# Patient Record
Sex: FEMALE | Race: BLACK OR AFRICAN AMERICAN | NOT HISPANIC OR LATINO | URBAN - METROPOLITAN AREA
[De-identification: names, ages, dates, MRNs, and addresses within clinical notes are randomized per-mention and may not be internally consistent; named-entity substitution may affect disease eponyms.]

---

## 2019-03-19 ENCOUNTER — EMERGENCY (EMERGENCY)
Facility: HOSPITAL | Age: 44
LOS: 0 days | Discharge: HOME | End: 2019-03-19
Attending: EMERGENCY MEDICINE
Payer: SELF-PAY

## 2019-03-19 VITALS
HEIGHT: 65 IN | WEIGHT: 160.06 LBS | SYSTOLIC BLOOD PRESSURE: 145 MMHG | OXYGEN SATURATION: 100 % | TEMPERATURE: 98 F | HEART RATE: 104 BPM | DIASTOLIC BLOOD PRESSURE: 105 MMHG | RESPIRATION RATE: 16 BRPM

## 2019-03-19 VITALS
TEMPERATURE: 98 F | DIASTOLIC BLOOD PRESSURE: 85 MMHG | RESPIRATION RATE: 15 BRPM | HEART RATE: 77 BPM | OXYGEN SATURATION: 100 % | SYSTOLIC BLOOD PRESSURE: 133 MMHG

## 2019-03-19 DIAGNOSIS — H92.01 OTALGIA, RIGHT EAR: ICD-10-CM

## 2019-03-19 DIAGNOSIS — H60.91 UNSPECIFIED OTITIS EXTERNA, RIGHT EAR: ICD-10-CM

## 2019-03-19 LAB
ALBUMIN SERPL ELPH-MCNC: 3.5 G/DL — SIGNIFICANT CHANGE UP (ref 3.3–5)
ALP SERPL-CCNC: 66 U/L — SIGNIFICANT CHANGE UP (ref 40–120)
ALT FLD-CCNC: 17 U/L — SIGNIFICANT CHANGE UP (ref 12–78)
ANION GAP SERPL CALC-SCNC: 5 MMOL/L — SIGNIFICANT CHANGE UP (ref 5–17)
AST SERPL-CCNC: 15 U/L — SIGNIFICANT CHANGE UP (ref 15–37)
BASOPHILS # BLD AUTO: 0.04 K/UL — SIGNIFICANT CHANGE UP (ref 0–0.2)
BASOPHILS NFR BLD AUTO: 0.6 % — SIGNIFICANT CHANGE UP (ref 0–2)
BILIRUB SERPL-MCNC: 0.3 MG/DL — SIGNIFICANT CHANGE UP (ref 0.2–1.2)
BUN SERPL-MCNC: 8 MG/DL — SIGNIFICANT CHANGE UP (ref 7–23)
CALCIUM SERPL-MCNC: 9 MG/DL — SIGNIFICANT CHANGE UP (ref 8.5–10.1)
CHLORIDE SERPL-SCNC: 107 MMOL/L — SIGNIFICANT CHANGE UP (ref 96–108)
CO2 SERPL-SCNC: 27 MMOL/L — SIGNIFICANT CHANGE UP (ref 22–31)
CREAT SERPL-MCNC: 0.58 MG/DL — SIGNIFICANT CHANGE UP (ref 0.5–1.3)
CRP SERPL-MCNC: 0.27 MG/DL — SIGNIFICANT CHANGE UP (ref 0–0.4)
EOSINOPHIL # BLD AUTO: 0.02 K/UL — SIGNIFICANT CHANGE UP (ref 0–0.5)
EOSINOPHIL NFR BLD AUTO: 0.3 % — SIGNIFICANT CHANGE UP (ref 0–6)
ERYTHROCYTE [SEDIMENTATION RATE] IN BLOOD: 27 MM/HR — HIGH (ref 0–15)
GLUCOSE SERPL-MCNC: 86 MG/DL — SIGNIFICANT CHANGE UP (ref 70–99)
HCT VFR BLD CALC: 36.4 % — SIGNIFICANT CHANGE UP (ref 34.5–45)
HGB BLD-MCNC: 10.8 G/DL — LOW (ref 11.5–15.5)
IMM GRANULOCYTES NFR BLD AUTO: 0.3 % — SIGNIFICANT CHANGE UP (ref 0–1.5)
LYMPHOCYTES # BLD AUTO: 2.46 K/UL — SIGNIFICANT CHANGE UP (ref 1–3.3)
LYMPHOCYTES # BLD AUTO: 35.7 % — SIGNIFICANT CHANGE UP (ref 13–44)
MCHC RBC-ENTMCNC: 23.1 PG — LOW (ref 27–34)
MCHC RBC-ENTMCNC: 29.7 GM/DL — LOW (ref 32–36)
MCV RBC AUTO: 77.9 FL — LOW (ref 80–100)
MONOCYTES # BLD AUTO: 0.6 K/UL — SIGNIFICANT CHANGE UP (ref 0–0.9)
MONOCYTES NFR BLD AUTO: 8.7 % — SIGNIFICANT CHANGE UP (ref 2–14)
NEUTROPHILS # BLD AUTO: 3.76 K/UL — SIGNIFICANT CHANGE UP (ref 1.8–7.4)
NEUTROPHILS NFR BLD AUTO: 54.4 % — SIGNIFICANT CHANGE UP (ref 43–77)
NRBC # BLD: 0 /100 WBCS — SIGNIFICANT CHANGE UP (ref 0–0)
PLATELET # BLD AUTO: 239 K/UL — SIGNIFICANT CHANGE UP (ref 150–400)
POTASSIUM SERPL-MCNC: 4 MMOL/L — SIGNIFICANT CHANGE UP (ref 3.5–5.3)
POTASSIUM SERPL-SCNC: 4 MMOL/L — SIGNIFICANT CHANGE UP (ref 3.5–5.3)
PROT SERPL-MCNC: 8 GM/DL — SIGNIFICANT CHANGE UP (ref 6–8.3)
RBC # BLD: 4.67 M/UL — SIGNIFICANT CHANGE UP (ref 3.8–5.2)
RBC # FLD: 18 % — HIGH (ref 10.3–14.5)
SODIUM SERPL-SCNC: 139 MMOL/L — SIGNIFICANT CHANGE UP (ref 135–145)
WBC # BLD: 6.9 K/UL — SIGNIFICANT CHANGE UP (ref 3.8–10.5)
WBC # FLD AUTO: 6.9 K/UL — SIGNIFICANT CHANGE UP (ref 3.8–10.5)

## 2019-03-19 PROCEDURE — 70450 CT HEAD/BRAIN W/O DYE: CPT | Mod: 26

## 2019-03-19 PROCEDURE — 99284 EMERGENCY DEPT VISIT MOD MDM: CPT

## 2019-03-19 RX ORDER — CIPROFLOXACIN LACTATE 400MG/40ML
500 VIAL (ML) INTRAVENOUS ONCE
Qty: 0 | Refills: 0 | Status: COMPLETED | OUTPATIENT
Start: 2019-03-19 | End: 2019-03-19

## 2019-03-19 RX ORDER — CIPROFLOXACIN AND DEXAMETHASONE 3; 1 MG/ML; MG/ML
4 SUSPENSION/ DROPS AURICULAR (OTIC)
Qty: 50 | Refills: 0
Start: 2019-03-19 | End: 2019-03-25

## 2019-03-19 RX ORDER — CIPROFLOXACIN LACTATE 400MG/40ML
1 VIAL (ML) INTRAVENOUS
Qty: 20 | Refills: 0
Start: 2019-03-19 | End: 2019-03-28

## 2019-03-19 RX ORDER — KETOROLAC TROMETHAMINE 30 MG/ML
30 SYRINGE (ML) INJECTION ONCE
Qty: 0 | Refills: 0 | Status: DISCONTINUED | OUTPATIENT
Start: 2019-03-19 | End: 2019-03-19

## 2019-03-19 RX ORDER — IBUPROFEN 200 MG
1 TABLET ORAL
Qty: 28 | Refills: 0
Start: 2019-03-19 | End: 2019-03-25

## 2019-03-19 RX ORDER — CIPROFLOXACIN AND DEXAMETHASONE 3; 1 MG/ML; MG/ML
5 SUSPENSION/ DROPS AURICULAR (OTIC) ONCE
Qty: 0 | Refills: 0 | Status: COMPLETED | OUTPATIENT
Start: 2019-03-19 | End: 2019-03-19

## 2019-03-19 RX ORDER — CIPROFLOXACIN HCL 0.3 %
5 DROPS OPHTHALMIC (EYE) ONCE
Qty: 0 | Refills: 0 | Status: DISCONTINUED | OUTPATIENT
Start: 2019-03-19 | End: 2019-03-19

## 2019-03-19 RX ADMIN — CIPROFLOXACIN AND DEXAMETHASONE 5 DROP(S): 3; 1 SUSPENSION/ DROPS AURICULAR (OTIC) at 12:30

## 2019-03-19 RX ADMIN — Medication 30 MILLIGRAM(S): at 11:01

## 2019-03-19 RX ADMIN — Medication 30 MILLIGRAM(S): at 10:38

## 2019-03-19 RX ADMIN — Medication 500 MILLIGRAM(S): at 12:30

## 2019-03-19 NOTE — ED ADULT NURSE NOTE - OBJECTIVE STATEMENT
Patient A&Ox3, offers complaints of ear pain that started sunday night with bleeding and yellow drainage. pain is localized to right ear. denies PMH

## 2019-03-19 NOTE — ED ADULT NURSE NOTE - NSIMPLEMENTINTERV_GEN_ALL_ED
Implemented All Universal Safety Interventions:  [place] to call system. Call bell, personal items and telephone within reach. Instruct patient to call for assistance. Room bathroom lighting operational. Non-slip footwear when patient is off stretcher. Physically safe environment: no spills, clutter or unnecessary equipment. Stretcher in lowest position, wheels locked, appropriate side rails in place.

## 2019-03-19 NOTE — ED PROVIDER NOTE - OBJECTIVE STATEMENT
Pt is a 44 yo lady with no significant past medical history who presents to the ED with R. ear pain. She used cotton to clean out her ear on Sunday, and later that day had ear pain. No change in hearing, no ringing. No fever, but has some purulent discharge. No headache, no sore throat, no rhinnorhea.

## 2019-03-19 NOTE — ED PROVIDER NOTE - PROGRESS NOTE DETAILS
Pt is feeling better, CT Head shows otitis externa, no clinical concern for mastoiditis given lack of pain at mastoid and no swelling or erythema. Pt referred to ENT for f/u, abx given. Return precautions given, ok for d/c.

## 2019-03-19 NOTE — ED PROVIDER NOTE - CLINICAL SUMMARY MEDICAL DECISION MAKING FREE TEXT BOX
Ddx: otitis externa/ ro malignant otitis externa/ mastoiditis  Plan: cbc, cmp esr, crp, ct head, abx,reassess Ddx: otitis externa/ ro malignant otitis externa/ mastoiditis, although no tenderness at mastoid  Plan: cbc, cmp esr, crp, ct head, abx,reassess

## 2019-03-19 NOTE — ED PROVIDER NOTE - RIGHT EAR
some purulent drainage, inflammation of ear canal. Slight pain with movement of pinna. No swelling of mastoid, nontender preauricular area. No swelling, no erythema

## 2019-03-20 ENCOUNTER — EMERGENCY (EMERGENCY)
Facility: HOSPITAL | Age: 44
LOS: 0 days | Discharge: HOME | End: 2019-03-21
Attending: EMERGENCY MEDICINE
Payer: SELF-PAY

## 2019-03-20 VITALS
WEIGHT: 160.06 LBS | DIASTOLIC BLOOD PRESSURE: 95 MMHG | HEIGHT: 62 IN | TEMPERATURE: 98 F | SYSTOLIC BLOOD PRESSURE: 125 MMHG | HEART RATE: 106 BPM | RESPIRATION RATE: 17 BRPM | OXYGEN SATURATION: 100 %

## 2019-03-20 PROCEDURE — 99053 MED SERV 10PM-8AM 24 HR FAC: CPT

## 2019-03-20 PROCEDURE — 99284 EMERGENCY DEPT VISIT MOD MDM: CPT | Mod: 25

## 2019-03-20 RX ORDER — OXYCODONE AND ACETAMINOPHEN 5; 325 MG/1; MG/1
1 TABLET ORAL ONCE
Qty: 0 | Refills: 0 | Status: DISCONTINUED | OUTPATIENT
Start: 2019-03-20 | End: 2019-03-20

## 2019-03-20 RX ADMIN — OXYCODONE AND ACETAMINOPHEN 1 TABLET(S): 5; 325 TABLET ORAL at 23:46

## 2019-03-20 RX ADMIN — OXYCODONE AND ACETAMINOPHEN 1 TABLET(S): 5; 325 TABLET ORAL at 23:16

## 2019-03-20 NOTE — ED ADULT NURSE NOTE - OBJECTIVE STATEMENT
Received patient in chair 26. C/o right ear pain with discharge 10/10. Right face/neck swelling noted. Patient states sore throat with difficulty eating. Percocet given at this time. Awaiting CT

## 2019-03-21 VITALS
SYSTOLIC BLOOD PRESSURE: 147 MMHG | TEMPERATURE: 98 F | RESPIRATION RATE: 17 BRPM | HEART RATE: 100 BPM | DIASTOLIC BLOOD PRESSURE: 105 MMHG | OXYGEN SATURATION: 98 %

## 2019-03-21 PROCEDURE — 70450 CT HEAD/BRAIN W/O DYE: CPT | Mod: 26

## 2019-03-21 RX ORDER — OXYCODONE AND ACETAMINOPHEN 5; 325 MG/1; MG/1
1 TABLET ORAL ONCE
Qty: 0 | Refills: 0 | Status: DISCONTINUED | OUTPATIENT
Start: 2019-03-21 | End: 2019-03-21

## 2019-03-21 RX ORDER — TRAMADOL HYDROCHLORIDE 50 MG/1
1 TABLET ORAL
Qty: 21 | Refills: 0
Start: 2019-03-21 | End: 2019-03-27

## 2019-03-21 RX ADMIN — OXYCODONE AND ACETAMINOPHEN 1 TABLET(S): 5; 325 TABLET ORAL at 02:16

## 2019-03-21 RX ADMIN — OXYCODONE AND ACETAMINOPHEN 1 TABLET(S): 5; 325 TABLET ORAL at 02:46

## 2019-03-21 NOTE — ED PROVIDER NOTE - CLINICAL SUMMARY MEDICAL DECISION MAKING FREE TEXT BOX
pt pw otitis externa. she has not given her ear infection enough time for the cipro to treat it. will give analgesics and reassess. pt pw otitis externa. she has not given her ear infection enough time for the cipro to treat it. will give analgesics and reassess. ct reasssuring. no mastoid involvement. continue ciprodex. ok for dc home.

## 2019-03-21 NOTE — ED PROVIDER NOTE - PHYSICAL EXAMINATION
Gen: Alert, NAD  Head: NC, AT   Eyes: PERRL, EOMI, normal lids/conjunctiva  ENT: decreased hearing right ear. purulent drainage and inflammation of the external canal   Neck: supple, no tenderness, Trachea midline  Pulm: Bilateral BS, normal resp effort, no wheeze/stridor/retractions  CV: RRR, no M/R/G, 2+ radial and dp pulses bl, no edema  Abd: soft, NT/ND, +BS, no hepatosplenomegaly  Mskel: extremities x4 with normal ROM and no joint effusions. no ctl spine ttp.   Skin: no rash, no bruising   Neuro: AAOx3, no sensory/motor deficits, CN 2-12 intact

## 2019-03-21 NOTE — ED PROVIDER NOTE - OBJECTIVE STATEMENT
Pertinent PMH/PSH/FHx/SHx and Review of Systems contained within:  43f recent dx of otitis externa pw continued pain in affected ear (right) started on cipro drops 3/19 but reports no improvement. has continued pain, continued discharge. no fever, chills, nausea, vomiting, bleeding, ha, vision loss, syncope  Fh and Sh not otherwise contributory  ROS otherwise negative

## 2019-03-21 NOTE — ED PROVIDER NOTE - NSFOLLOWUPINSTRUCTIONS_ED_ALL_ED_FT
Take the pain medication as prescribed.    Put nothing in your ear other than when instructed by a physician.     Continue the ear drops as prescribed.

## 2019-03-22 DIAGNOSIS — H60.311 DIFFUSE OTITIS EXTERNA, RIGHT EAR: ICD-10-CM

## 2019-03-22 DIAGNOSIS — H92.01 OTALGIA, RIGHT EAR: ICD-10-CM

## 2019-03-25 ENCOUNTER — EMERGENCY (EMERGENCY)
Facility: HOSPITAL | Age: 44
LOS: 1 days | Discharge: HOME | End: 2019-03-25
Attending: EMERGENCY MEDICINE | Admitting: INTERNAL MEDICINE
Payer: SELF-PAY

## 2019-03-25 ENCOUNTER — EMERGENCY (EMERGENCY)
Facility: HOSPITAL | Age: 44
LOS: 0 days | Discharge: ANOTHER HOSPITAL | End: 2019-03-25
Attending: EMERGENCY MEDICINE
Payer: SELF-PAY

## 2019-03-25 VITALS
HEART RATE: 101 BPM | OXYGEN SATURATION: 94 % | RESPIRATION RATE: 16 BRPM | SYSTOLIC BLOOD PRESSURE: 144 MMHG | DIASTOLIC BLOOD PRESSURE: 100 MMHG | TEMPERATURE: 97 F

## 2019-03-25 VITALS
WEIGHT: 154.98 LBS | DIASTOLIC BLOOD PRESSURE: 99 MMHG | RESPIRATION RATE: 18 BRPM | HEART RATE: 94 BPM | TEMPERATURE: 99 F | OXYGEN SATURATION: 99 % | HEIGHT: 62 IN | SYSTOLIC BLOOD PRESSURE: 152 MMHG

## 2019-03-25 VITALS
OXYGEN SATURATION: 99 % | DIASTOLIC BLOOD PRESSURE: 87 MMHG | TEMPERATURE: 99 F | SYSTOLIC BLOOD PRESSURE: 161 MMHG | RESPIRATION RATE: 16 BRPM | HEART RATE: 95 BPM

## 2019-03-25 DIAGNOSIS — H92.09 OTALGIA, UNSPECIFIED EAR: ICD-10-CM

## 2019-03-25 DIAGNOSIS — H60.90 UNSPECIFIED OTITIS EXTERNA, UNSPECIFIED EAR: ICD-10-CM

## 2019-03-25 DIAGNOSIS — H70.90 UNSPECIFIED MASTOIDITIS, UNSPECIFIED EAR: ICD-10-CM

## 2019-03-25 LAB
ALBUMIN SERPL ELPH-MCNC: 3.1 G/DL — LOW (ref 3.3–5)
ALP SERPL-CCNC: 60 U/L — SIGNIFICANT CHANGE UP (ref 40–120)
ALT FLD-CCNC: 14 U/L — SIGNIFICANT CHANGE UP (ref 12–78)
ANION GAP SERPL CALC-SCNC: 7 MMOL/L — SIGNIFICANT CHANGE UP (ref 5–17)
AST SERPL-CCNC: 14 U/L — LOW (ref 15–37)
BASOPHILS # BLD AUTO: 0.04 K/UL — SIGNIFICANT CHANGE UP (ref 0–0.2)
BASOPHILS NFR BLD AUTO: 0.5 % — SIGNIFICANT CHANGE UP (ref 0–2)
BILIRUB SERPL-MCNC: 0.3 MG/DL — SIGNIFICANT CHANGE UP (ref 0.2–1.2)
BUN SERPL-MCNC: 10 MG/DL — SIGNIFICANT CHANGE UP (ref 7–23)
CALCIUM SERPL-MCNC: 8.5 MG/DL — SIGNIFICANT CHANGE UP (ref 8.5–10.1)
CHLORIDE SERPL-SCNC: 105 MMOL/L — SIGNIFICANT CHANGE UP (ref 96–108)
CO2 SERPL-SCNC: 27 MMOL/L — SIGNIFICANT CHANGE UP (ref 22–31)
CREAT SERPL-MCNC: 0.78 MG/DL — SIGNIFICANT CHANGE UP (ref 0.5–1.3)
EOSINOPHIL # BLD AUTO: 0.12 K/UL — SIGNIFICANT CHANGE UP (ref 0–0.5)
EOSINOPHIL NFR BLD AUTO: 1.4 % — SIGNIFICANT CHANGE UP (ref 0–6)
GLUCOSE SERPL-MCNC: 85 MG/DL — SIGNIFICANT CHANGE UP (ref 70–99)
HCT VFR BLD CALC: 33.9 % — LOW (ref 34.5–45)
HGB BLD-MCNC: 9.8 G/DL — LOW (ref 11.5–15.5)
IMM GRANULOCYTES NFR BLD AUTO: 0.1 % — SIGNIFICANT CHANGE UP (ref 0–1.5)
LYMPHOCYTES # BLD AUTO: 2.43 K/UL — SIGNIFICANT CHANGE UP (ref 1–3.3)
LYMPHOCYTES # BLD AUTO: 29.3 % — SIGNIFICANT CHANGE UP (ref 13–44)
MCHC RBC-ENTMCNC: 22.9 PG — LOW (ref 27–34)
MCHC RBC-ENTMCNC: 28.9 GM/DL — LOW (ref 32–36)
MCV RBC AUTO: 79.2 FL — LOW (ref 80–100)
MONOCYTES # BLD AUTO: 0.84 K/UL — SIGNIFICANT CHANGE UP (ref 0–0.9)
MONOCYTES NFR BLD AUTO: 10.1 % — SIGNIFICANT CHANGE UP (ref 2–14)
NEUTROPHILS # BLD AUTO: 4.85 K/UL — SIGNIFICANT CHANGE UP (ref 1.8–7.4)
NEUTROPHILS NFR BLD AUTO: 58.6 % — SIGNIFICANT CHANGE UP (ref 43–77)
NRBC # BLD: 0 /100 WBCS — SIGNIFICANT CHANGE UP (ref 0–0)
PLATELET # BLD AUTO: 211 K/UL — SIGNIFICANT CHANGE UP (ref 150–400)
POTASSIUM SERPL-MCNC: 4.4 MMOL/L — SIGNIFICANT CHANGE UP (ref 3.5–5.3)
POTASSIUM SERPL-SCNC: 4.4 MMOL/L — SIGNIFICANT CHANGE UP (ref 3.5–5.3)
PROT SERPL-MCNC: 7.9 GM/DL — SIGNIFICANT CHANGE UP (ref 6–8.3)
RBC # BLD: 4.28 M/UL — SIGNIFICANT CHANGE UP (ref 3.8–5.2)
RBC # FLD: 18.2 % — HIGH (ref 10.3–14.5)
SODIUM SERPL-SCNC: 139 MMOL/L — SIGNIFICANT CHANGE UP (ref 135–145)
WBC # BLD: 8.29 K/UL — SIGNIFICANT CHANGE UP (ref 3.8–10.5)
WBC # FLD AUTO: 8.29 K/UL — SIGNIFICANT CHANGE UP (ref 3.8–10.5)

## 2019-03-25 PROCEDURE — 76377 3D RENDER W/INTRP POSTPROCES: CPT | Mod: 26

## 2019-03-25 PROCEDURE — 70486 CT MAXILLOFACIAL W/O DYE: CPT | Mod: 26

## 2019-03-25 PROCEDURE — 99284 EMERGENCY DEPT VISIT MOD MDM: CPT | Mod: 25

## 2019-03-25 PROCEDURE — 99285 EMERGENCY DEPT VISIT HI MDM: CPT

## 2019-03-25 RX ORDER — AMPICILLIN SODIUM AND SULBACTAM SODIUM 250; 125 MG/ML; MG/ML
3 INJECTION, POWDER, FOR SUSPENSION INTRAMUSCULAR; INTRAVENOUS ONCE
Qty: 0 | Refills: 0 | Status: COMPLETED | OUTPATIENT
Start: 2019-03-25 | End: 2019-03-25

## 2019-03-25 RX ORDER — MORPHINE SULFATE 50 MG/1
4 CAPSULE, EXTENDED RELEASE ORAL ONCE
Qty: 0 | Refills: 0 | Status: DISCONTINUED | OUTPATIENT
Start: 2019-03-25 | End: 2019-03-25

## 2019-03-25 RX ADMIN — AMPICILLIN SODIUM AND SULBACTAM SODIUM 3 GRAM(S): 250; 125 INJECTION, POWDER, FOR SUSPENSION INTRAMUSCULAR; INTRAVENOUS at 21:47

## 2019-03-25 RX ADMIN — MORPHINE SULFATE 4 MILLIGRAM(S): 50 CAPSULE, EXTENDED RELEASE ORAL at 21:15

## 2019-03-25 RX ADMIN — MORPHINE SULFATE 4 MILLIGRAM(S): 50 CAPSULE, EXTENDED RELEASE ORAL at 21:47

## 2019-03-25 RX ADMIN — AMPICILLIN SODIUM AND SULBACTAM SODIUM 200 GRAM(S): 250; 125 INJECTION, POWDER, FOR SUSPENSION INTRAMUSCULAR; INTRAVENOUS at 21:17

## 2019-03-25 NOTE — ED PROVIDER NOTE - CLINICAL SUMMARY MEDICAL DECISION MAKING FREE TEXT BOX
Ddx: Mastoiditits/ otitis externa  Plan: Cbc, cmp, CT Max/face, ENT consult as needed, reassess Ddx: Mastoiditits/ otitis externa  Plan: Cbc, cmp, CT Max/face, ENT consult as needed, reassess    Pt with mastoiditis noted on CT, with infection resistant to oral and external application of cipro - will transfer to [facility] for further care with Dr. Patino.

## 2019-03-25 NOTE — ED ADULT TRIAGE NOTE - CHIEF COMPLAINT QUOTE
pt complaining of pain and drainnage from right ear times 2 weeks. states she was seen in this ER and started on antibiotics with no improvement. pt also complaining of headache and sore throat.

## 2019-03-25 NOTE — ED ADULT NURSE NOTE - NSIMPLEMENTINTERV_GEN_ALL_ED
Implemented All Universal Safety Interventions:  [place] to call system. Call bell, personal items and telephone within reach. Instruct patient to call for assistance. Room bathroom lighting operational. Non-slip footwear when patient is off stretcher. Physically safe environment: no spills, clutter or unnecessary equipment. Stretcher in lowest position, wheels locked, appropriate side rails in place.

## 2019-03-25 NOTE — ED PROVIDER NOTE - OBJECTIVE STATEMENT
Pt is a 42 yo lady with no significant past medical history who presents to the ED with R. ear pain. Started last week. Was diagnosed with otitis externa, and treated with ciprodex otic and cipro po. Pt still is having ear pain. Has purulent discharge from R. ear. Started after she cleaned her R. ear with cotton last week. No fevers, no headache, no n/v/d. Referred to ENT, but appt couldn't be obtained until 4/1.

## 2019-03-25 NOTE — ED ADULT TRIAGE NOTE - CHIEF COMPLAINT QUOTE
Transfer from [facility] for ENT eval for mastoiditis - having drainage/pain from R Ear traveling down R Jaw for 1 wk, treated w/ po antibiotics w/o improvement. Had CT & labs, Given 4mg Morphine, Sulfbactim IVPB at [facility] pta.  Currently w/o pain complaints.  No PMHx or allergies.  Has IVL, 20g L AC.

## 2019-03-25 NOTE — ED ADULT NURSE NOTE - OBJECTIVE STATEMENT
Patient c/o severe right ear pain causing N/V and has associated headache. Patient right ear is impacted on assessment. Patient daughter states patient was sent home on medication however due to impaction, medication can not properly enter ear. Patient c/o dizziness. Patient states pain is affecting chewing. Patient complaining of constipation. Patient c/o severe right ear pain causing N/V and has associated headache. Patient right ear is impacted on assessment. Patient daughter states patient was sent home on medication however due to impaction, medication can not properly enter ear. Patient c/o dizziness. Patient states pain is affecting chewing. Patient complaining of constipation. Patient was suppose to go get a colonoscopy on Thursday by .

## 2019-03-26 VITALS
OXYGEN SATURATION: 100 % | SYSTOLIC BLOOD PRESSURE: 136 MMHG | RESPIRATION RATE: 17 BRPM | DIASTOLIC BLOOD PRESSURE: 88 MMHG | TEMPERATURE: 98 F | HEART RATE: 82 BPM

## 2019-03-26 PROBLEM — H66.90 OTITIS MEDIA, UNSPECIFIED, UNSPECIFIED EAR: Chronic | Status: INACTIVE | Noted: 2019-03-25 | Resolved: 2019-03-26

## 2019-03-26 LAB
ALBUMIN SERPL ELPH-MCNC: 3.8 G/DL — SIGNIFICANT CHANGE UP (ref 3.3–5)
ALP SERPL-CCNC: 57 U/L — SIGNIFICANT CHANGE UP (ref 40–120)
ALT FLD-CCNC: 9 U/L — SIGNIFICANT CHANGE UP (ref 4–33)
ANION GAP SERPL CALC-SCNC: 11 MMO/L — SIGNIFICANT CHANGE UP (ref 7–14)
AST SERPL-CCNC: 16 U/L — SIGNIFICANT CHANGE UP (ref 4–32)
BASOPHILS # BLD AUTO: 0.05 K/UL — SIGNIFICANT CHANGE UP (ref 0–0.2)
BASOPHILS NFR BLD AUTO: 0.5 % — SIGNIFICANT CHANGE UP (ref 0–2)
BILIRUB SERPL-MCNC: 0.3 MG/DL — SIGNIFICANT CHANGE UP (ref 0.2–1.2)
BUN SERPL-MCNC: 8 MG/DL — SIGNIFICANT CHANGE UP (ref 7–23)
CALCIUM SERPL-MCNC: 9.2 MG/DL — SIGNIFICANT CHANGE UP (ref 8.4–10.5)
CHLORIDE SERPL-SCNC: 101 MMOL/L — SIGNIFICANT CHANGE UP (ref 98–107)
CO2 SERPL-SCNC: 25 MMOL/L — SIGNIFICANT CHANGE UP (ref 22–31)
CREAT SERPL-MCNC: 0.85 MG/DL — SIGNIFICANT CHANGE UP (ref 0.5–1.3)
EOSINOPHIL # BLD AUTO: 0.07 K/UL — SIGNIFICANT CHANGE UP (ref 0–0.5)
EOSINOPHIL NFR BLD AUTO: 0.8 % — SIGNIFICANT CHANGE UP (ref 0–6)
GLUCOSE SERPL-MCNC: 138 MG/DL — HIGH (ref 70–99)
HBA1C BLD-MCNC: 5.1 % — SIGNIFICANT CHANGE UP (ref 4–5.6)
HCG SERPL-ACNC: < 5 MIU/ML — SIGNIFICANT CHANGE UP
HCT VFR BLD CALC: 33.2 % — LOW (ref 34.5–45)
HGB BLD-MCNC: 9.8 G/DL — LOW (ref 11.5–15.5)
IMM GRANULOCYTES NFR BLD AUTO: 0.2 % — SIGNIFICANT CHANGE UP (ref 0–1.5)
LYMPHOCYTES # BLD AUTO: 2.43 K/UL — SIGNIFICANT CHANGE UP (ref 1–3.3)
LYMPHOCYTES # BLD AUTO: 26.4 % — SIGNIFICANT CHANGE UP (ref 13–44)
MCHC RBC-ENTMCNC: 23.1 PG — LOW (ref 27–34)
MCHC RBC-ENTMCNC: 29.5 % — LOW (ref 32–36)
MCV RBC AUTO: 78.1 FL — LOW (ref 80–100)
MONOCYTES # BLD AUTO: 0.7 K/UL — SIGNIFICANT CHANGE UP (ref 0–0.9)
MONOCYTES NFR BLD AUTO: 7.6 % — SIGNIFICANT CHANGE UP (ref 2–14)
NEUTROPHILS # BLD AUTO: 5.95 K/UL — SIGNIFICANT CHANGE UP (ref 1.8–7.4)
NEUTROPHILS NFR BLD AUTO: 64.5 % — SIGNIFICANT CHANGE UP (ref 43–77)
NRBC # FLD: 0 K/UL — SIGNIFICANT CHANGE UP (ref 0–0)
PLATELET # BLD AUTO: 205 K/UL — SIGNIFICANT CHANGE UP (ref 150–400)
PMV BLD: 11.1 FL — SIGNIFICANT CHANGE UP (ref 7–13)
POTASSIUM SERPL-MCNC: 3.7 MMOL/L — SIGNIFICANT CHANGE UP (ref 3.5–5.3)
POTASSIUM SERPL-SCNC: 3.7 MMOL/L — SIGNIFICANT CHANGE UP (ref 3.5–5.3)
PROT SERPL-MCNC: 7.6 G/DL — SIGNIFICANT CHANGE UP (ref 6–8.3)
RBC # BLD: 4.25 M/UL — SIGNIFICANT CHANGE UP (ref 3.8–5.2)
RBC # FLD: 17.9 % — HIGH (ref 10.3–14.5)
SODIUM SERPL-SCNC: 137 MMOL/L — SIGNIFICANT CHANGE UP (ref 135–145)
WBC # BLD: 9.22 K/UL — SIGNIFICANT CHANGE UP (ref 3.8–10.5)
WBC # FLD AUTO: 9.22 K/UL — SIGNIFICANT CHANGE UP (ref 3.8–10.5)

## 2019-03-26 PROCEDURE — 99236 HOSP IP/OBS SAME DATE HI 85: CPT

## 2019-03-26 RX ORDER — KETOROLAC TROMETHAMINE 30 MG/ML
30 SYRINGE (ML) INJECTION ONCE
Qty: 0 | Refills: 0 | Status: DISCONTINUED | OUTPATIENT
Start: 2019-03-26 | End: 2019-03-26

## 2019-03-26 RX ORDER — DEXAMETHASONE 0.5 MG/5ML
4 ELIXIR ORAL ONCE
Qty: 0 | Refills: 0 | Status: COMPLETED | OUTPATIENT
Start: 2019-03-26 | End: 2019-03-26

## 2019-03-26 RX ORDER — SODIUM CHLORIDE 9 MG/ML
1000 INJECTION INTRAMUSCULAR; INTRAVENOUS; SUBCUTANEOUS ONCE
Qty: 0 | Refills: 0 | Status: COMPLETED | OUTPATIENT
Start: 2019-03-26 | End: 2019-03-26

## 2019-03-26 RX ORDER — CIPROFLOXACIN LACTATE 400MG/40ML
750 VIAL (ML) INTRAVENOUS
Qty: 0 | Refills: 0 | Status: DISCONTINUED | OUTPATIENT
Start: 2019-03-26 | End: 2019-03-29

## 2019-03-26 RX ORDER — CIPROFLOXACIN LACTATE 400MG/40ML
250 VIAL (ML) INTRAVENOUS ONCE
Qty: 0 | Refills: 0 | Status: COMPLETED | OUTPATIENT
Start: 2019-03-26 | End: 2019-03-26

## 2019-03-26 RX ORDER — CIPROFLOXACIN AND DEXAMETHASONE 3; 1 MG/ML; MG/ML
4 SUSPENSION/ DROPS AURICULAR (OTIC) EVERY 12 HOURS
Qty: 0 | Refills: 0 | Status: DISCONTINUED | OUTPATIENT
Start: 2019-03-26 | End: 2019-03-29

## 2019-03-26 RX ORDER — KETOROLAC TROMETHAMINE 30 MG/ML
30 SYRINGE (ML) INJECTION EVERY 6 HOURS
Qty: 0 | Refills: 0 | Status: DISCONTINUED | OUTPATIENT
Start: 2019-03-26 | End: 2019-03-26

## 2019-03-26 RX ADMIN — CIPROFLOXACIN AND DEXAMETHASONE 4 DROP(S): 3; 1 SUSPENSION/ DROPS AURICULAR (OTIC) at 07:11

## 2019-03-26 RX ADMIN — SODIUM CHLORIDE 1000 MILLILITER(S): 9 INJECTION INTRAMUSCULAR; INTRAVENOUS; SUBCUTANEOUS at 02:22

## 2019-03-26 RX ADMIN — Medication 30 MILLIGRAM(S): at 07:00

## 2019-03-26 RX ADMIN — Medication 30 MILLIGRAM(S): at 02:22

## 2019-03-26 RX ADMIN — Medication 4 MILLIGRAM(S): at 02:23

## 2019-03-26 RX ADMIN — Medication 250 MILLIGRAM(S): at 02:30

## 2019-03-26 NOTE — ED CDU PROVIDER INITIAL DAY NOTE - OBJECTIVE STATEMENT
43F transferred in stable condition from [facility] for ENT c/s for r/o mastoiditis. Pt has had 1 week persistent worsening right ear pain with discharge. 1 week ago started on Ciprodex otic ggt, ciprofloxacin 500 mg BID, motrin and tramadol. CT at [facility] reported ?right mastoiditis. Pt took PO cipro at midnight and was given Augmentin. Seen by ENT shortly after arrival at [facility]. Pt also c/o sore throat for several days without odynophagia, drooling or vocal changes. No fevers.    CDU ESCOBAR Carrillo Note-----  44 yo female, no stated PMH, presented to this ED after transfer from [facility] for ENT evaluation as per above.  Pt. was evaluated in this ED and ENT was consulted; ear canal was debrided and wick placed to right ear.  ENT advised to continue Cipro and Ciprodex and f/u outpatient; ED team increased pt's Cipro dose from 500 mg to 750 mg (pt had taken evening dose ~00:00 hrs, so additional 250 mg Cipro was given) and dispo'd pt to CDU for continued care plan:  Antibiotics per orders, analgesia PRN, ENT reassessment, general observation care / monitoring.  On CDU evaluation, pt c/o right ear discomfort; denies pain to postauricular region overlying mastoid area.  No stated hx/o fevers or chills.  CDU care plan discussed with pt who verbalized agreement with plan.

## 2019-03-26 NOTE — ED CDU PROVIDER DISPOSITION NOTE - PLAN OF CARE
Patient advised to follow up with PRIMARY CARE DOCTOR IN 1-2 DAYS AND ENT (Ear, nose and throat) within week and told to return to the emergency department immediately for any new or concerning symptoms such as fevers, chills, worsening pain or discharge OR ANY OTHER COMPLAINTS. Patient agrees with plan.    Continue the oral antibiotics you have been taking  Continue to the antibiotic drops ciprodex for 10 days   Follow up with ENT   Return if any new or worsening symptoms

## 2019-03-26 NOTE — ED CDU PROVIDER INITIAL DAY NOTE - ENMT, MLM
Airway patent. Nasal mucosa clear. Right TM with bloody discharge and end-point of wick noted in canal; no objective TTP to right mastoid region; mild TTP to right infraauricular region.  Left EAC and TM appear WNL.  Mouth with moist mucosa, no oropharyngeal hyperemia, tonsillar enlargement, exudates; soft palate symmetrical; uvula midline. Neck supple.

## 2019-03-26 NOTE — ED ADULT NURSE NOTE - CHIEF COMPLAINT QUOTE
Transfer from [facility] for ENT eval for mastoiditis - having drainage/pain from R Ear traveling down R Jaw for 1 wk, treated w/ po antibiotics w/o improvement. Had CT & labs, Given 4mg Morphine, Sulfbactim IVPB at [facility] pta.  Currently w/o pain complaints.  No PMHx or allergies.  Has IVL, 20g L AC.

## 2019-03-26 NOTE — ED PROVIDER NOTE - CLINICAL SUMMARY MEDICAL DECISION MAKING FREE TEXT BOX
ENT reports right otitis externa. Ear canal was debrided and wick placed. Will increase oral abx to 750 mg BID. CDU for IVF, pain control and noon IV abx prior to dc.

## 2019-03-26 NOTE — ED CDU PROVIDER INITIAL DAY NOTE - INDICATION FOR OBSERVATION
Other/Therapeutic Intensity/Antibiotics per orders, analgesia PRN, ENT reassessment, general observation care / monitoring.

## 2019-03-26 NOTE — ED PROVIDER NOTE - ENMT, MLM
Airway patent, Nasal mucosa clear. Mouth with normal mucosa. Throat has no vesicles, no oropharyngeal exudates and uvula is midline. Right external ear canal swollen. No mastoid process TTP.

## 2019-03-26 NOTE — ED CDU PROVIDER INITIAL DAY NOTE - PROGRESS NOTE DETAILS
Pt objectively noted to be resting comfortably in the interim.  Will continue to monitor; pt will be signed out to the day CDU PA (Anette) and attending (Dr. Flowers) at 0700 hrs. Patient received at sign out, patient seen by ENT, wick placed, patient cleared from their standpoint for dc home and outpatient follow up with ENT. Pt to continue her PO abx and ciprodex drops x 10 days. Follow up info provided. Pt understood and agreed with plan. All question and concerns addressed. Strict return instructions given. No complaints noted.

## 2019-03-26 NOTE — CONSULT NOTE ADULT - SUBJECTIVE AND OBJECTIVE BOX
HPI: 43F no significant pmh presents with R otalgia x 1 week. Pt was started on cipro PO and ciprodex drops about 5 days ago with no improvement in pain. Pt also now noting blood draining from R ear. Pt reports history of on two prior occasions needing her right ear "drained" after she had similar symptoms. Denies history of recurrent ear infections. Denies tinnitus, vertigo.     PMH denies   PSH denies     T(C): 37.2 (03-25-19 @ 22:25), Max: 37.2 (03-25-19 @ 22:25)  HR: 95 (03-25-19 @ 22:25) (86 - 101)  BP: 161/87 (03-25-19 @ 22:25) (144/91 - 161/87)  RR: 16 (03-25-19 @ 22:25) (16 - 18)  SpO2: 99% (03-25-19 @ 22:25) (94% - 99%)    NAd, awake, alert, well appearing   Breathing comfortably on RA   L ear pinna wnl EAC clear, TM intact   R ear pinna wnl, no erytheme/edema, EAC fully obstructed with dried blood and red/black/white debris   Mastoid flat, non tender, no erythema or edema   Neck soft   Facial movements intact      R EAC debrided with 7 [place] ear suction and alligator - copious debris - EAC appears severely narrowed, unable to visualize TM - wick placed    WBC 8    CT reviewed - partial opacification of middle ear and mastoid, aeration pockets remain, EAC completely opacified - not dedicated CT temporal bone but no obvious bony erosion of EAC on scan     A/P: 43F with svere R otitis externa - EAC debrided at bedside and wick placed   -continue cipro PO and ciprodex drops x 10 days   -follow up outpatient in 1-2 weeks, call [phone] for appointment

## 2019-03-26 NOTE — ED CDU PROVIDER DISPOSITION NOTE - CLINICAL COURSE
Flowers: 43F from [facility] for ENT c/s for r/o mastoiditis based on CT findings and worsening symptoms. Pt has had 1 week persistent worsening right ear pain with discharge. 1 week ago started on Ciprodex otic ggt, ciprofloxacin 500 mg BID, motrin and tramadol. CT at [facility] reported ?right mastoiditis. pt initially had ear pain but no mastoiditis.  seen by ent and placed a wick and rec continue ciprodex drop with cipro po x 10 days with outpt ent f/u for severe otits externa.  Admitted to CDU for observation.  Pt states pain improved and offer no complains.    a/p: right otitis externa s/p wick and po/otic drop.  dc home with ciprodex/cipro po, motrin/tylenol for pain and follow up outpt ENT.

## 2019-03-26 NOTE — ED PROVIDER NOTE - OBJECTIVE STATEMENT
43F transferred in stable condition from [facility] for ENT c/s for r/o mastoiditis. Pt has had 1 week persistent worsening right ear pain with discharge. 1 week ago started on Ciprodex otic ggt, ciprofloxacin 500 mg BID, motrin and tramadol. CT at 43F transferred in stable condition from [facility] for ENT c/s for r/o mastoiditis. Pt has had 1 week persistent worsening right ear pain with discharge. 1 week ago started on Ciprodex otic ggt, ciprofloxacin 500 mg BID, motrin and tramadol. CT at [facility] reported ?right mastoiditis. Pt took PO cipro at midnight and was given Augmentin. Seen by ENT shortly after arrival at [facility]. Pt also c/o sore throat for several days without odynophagia, drooling or vocal changes. No fevers.

## 2019-03-26 NOTE — ED CDU PROVIDER INITIAL DAY NOTE - ATTENDING CONTRIBUTION TO CARE
Flowers: 43F from [facility] for ENT c/s for r/o mastoiditis. Pt has had 1 week persistent worsening right ear pain with discharge. 1 week ago started on Ciprodex otic ggt, ciprofloxacin 500 mg BID, motrin and tramadol. CT at [facility] reported ?right mastoiditis. pt initially had ear pain but no mastoiditis.  seen by ent and placed a wick and rec continue ciprodex drop with cipro po x 10 days with outpt ent f/u for severe otits externa.  Admitted to CDU for observation.  Pt states pain improved and offer no complains.    *GEN:   comfortable, in no apparent distress, AOx3  *HEENT:   airway patent, moist mucosal membranes, uvula midline, right ear wick in place, normal anatomic ear lie, no ttp at mastoid  *CV:   regular rate and rhythm, normal S1/S2, no murmur  *RESP:   clear to auscultation bilaterally, non-labored, speaking in full sentences  *ABD:   soft, non tender, no guarding  *NEURO:   AOx3, GCS 15    a/p: right otitis externa s/p wick and po/otic drop.  dc home with ciprodex/cipro po, motrin/tylenol for pain and follow up outpt ENT.

## 2019-03-26 NOTE — ED ADULT NURSE NOTE - OBJECTIVE STATEMENT
pt transferred from [facility] for ENT consult , presents with otitis externa has wick in place, complains of increasing pain

## 2019-05-14 PROBLEM — Z00.00 ENCOUNTER FOR PREVENTIVE HEALTH EXAMINATION: Status: ACTIVE | Noted: 2019-05-14

## 2019-05-14 PROBLEM — H60.90 UNSPECIFIED OTITIS EXTERNA, UNSPECIFIED EAR: Chronic | Status: ACTIVE | Noted: 2019-03-26

## 2019-05-15 ENCOUNTER — APPOINTMENT (OUTPATIENT)
Dept: ORTHOPEDIC SURGERY | Facility: CLINIC | Age: 44
End: 2019-05-15
Payer: COMMERCIAL

## 2019-05-15 VITALS
HEART RATE: 102 BPM | WEIGHT: 164 LBS | HEIGHT: 62 IN | BODY MASS INDEX: 30.18 KG/M2 | SYSTOLIC BLOOD PRESSURE: 133 MMHG | DIASTOLIC BLOOD PRESSURE: 96 MMHG

## 2019-05-15 PROCEDURE — 99203 OFFICE O/P NEW LOW 30 MIN: CPT

## 2019-05-15 PROCEDURE — 72100 X-RAY EXAM L-S SPINE 2/3 VWS: CPT

## 2019-05-15 NOTE — PHYSICAL EXAM
[Antalgic] : antalgic [de-identified] : Examination of the lumbar spine reveals no midline tenderness palpation, step-offs, or skin lesions. Decreased range of motion with respect to flexion, extension, lateral bending, and rotation. No tenderness to palpation of the sciatic notch. No tenderness palpation of the bilateral greater trochanters. No pain with passive internal/external rotation of the hips. No instability of bilateral lower extremities.  Negative SEBASTIAN. Negative straight leg raise bilaterally. No bowstring. Negative femoral stretch. 5 out of 5 iliopsoas, hip abductors, hips adductors, quadriceps, hamstrings, gastrocsoleus, tibialis anterior, extensor hallucis longus, peroneals. Grossly intact sensation to light touch bilateral lower extremities. 1+ patellar and Achilles reflexes. Downgoing Babinski. No clonus. Intact proprioception. Palpable pulses. No skin lesion and no edema on the right and left lower extremities. [de-identified] : AP lateral lumbar systems shows mild spondylosis without gross instability or aggressive lesions.

## 2019-05-15 NOTE — DISCUSSION/SUMMARY
[de-identified] : She will try a course of physical therapy. Should her symptoms change or worsen advance imaging would be warranted

## 2019-05-15 NOTE — HISTORY OF PRESENT ILLNESS
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 She has not had any treatment for this. She denies any radiation of her symptoms her arms or legs. No numbness, tingling, weakness, or bowel or bladder dysfunction. No difficulty with balance or fine motor skills. No fevers or chills. No constitutional symptoms or recent unintended weight loss.

## 2019-06-14 ENCOUNTER — APPOINTMENT (OUTPATIENT)
Dept: ORTHOPEDIC SURGERY | Facility: CLINIC | Age: 44
End: 2019-06-14
Payer: COMMERCIAL

## 2019-06-14 PROCEDURE — 99214 OFFICE O/P EST MOD 30 MIN: CPT

## 2019-06-14 NOTE — DISCUSSION/SUMMARY
[de-identified] : I have encouraged her to try a course of physical therapy. Should her symptoms change or worsen advanced imaging would be warranted.

## 2019-06-14 NOTE — PHYSICAL EXAM
[Antalgic] : antalgic [de-identified] : Examination of the lumbar spine reveals no midline tenderness palpation, step-offs, or skin lesions. Decreased range of motion with respect to flexion, extension, lateral bending, and rotation. No tenderness to palpation of the sciatic notch. No tenderness palpation of the bilateral greater trochanters. No pain with passive internal/external rotation of the hips. No instability of bilateral lower extremities.  Negative SEBASTIAN. Negative straight leg raise bilaterally. No bowstring. Negative femoral stretch. 5 out of 5 iliopsoas, hip abductors, hips adductors, quadriceps, hamstrings, gastrocsoleus, tibialis anterior, extensor hallucis longus, peroneals. Grossly intact sensation to light touch bilateral lower extremities. 1+ patellar and Achilles reflexes. Downgoing Babinski. No clonus. Intact proprioception. Palpable pulses. No skin lesion and no edema on the right and left lower extremities. [de-identified] : AP lateral lumbar systems shows mild spondylosis without gross instability or aggressive lesions.

## 2019-08-19 ENCOUNTER — APPOINTMENT (OUTPATIENT)
Dept: ORTHOPEDIC SURGERY | Facility: CLINIC | Age: 44
End: 2019-08-19
Payer: COMMERCIAL

## 2019-08-19 PROCEDURE — 99214 OFFICE O/P EST MOD 30 MIN: CPT

## 2019-08-19 NOTE — PHYSICAL EXAM
[Antalgic] : antalgic [de-identified] : Examination of the lumbar spine reveals no midline tenderness palpation, step-offs, or skin lesions. Decreased range of motion with respect to flexion, extension, lateral bending, and rotation. No tenderness to palpation of the sciatic notch. No tenderness palpation of the bilateral greater trochanters. No pain with passive internal/external rotation of the hips. No instability of bilateral lower extremities.  Negative SEBASTIAN. Negative straight leg raise bilaterally. No bowstring. Negative femoral stretch. 5 out of 5 iliopsoas, hip abductors, hips adductors, quadriceps, hamstrings, gastrocsoleus, tibialis anterior, extensor hallucis longus, peroneals. Grossly intact sensation to light touch bilateral lower extremities. 1+ patellar and Achilles reflexes. Downgoing Babinski. No clonus. Intact proprioception. Palpable pulses. No skin lesion and no edema on the right and left lower extremities. [de-identified] : AP lateral lumbar systems shows mild spondylosis without gross instability or aggressive lesions.

## 2019-08-19 NOTE — DISCUSSION/SUMMARY
[de-identified] : Given her increasing symptoms we will obtain a lumbar spine MRI. Follow up afterwards.

## 2019-08-19 NOTE — HISTORY OF PRESENT ILLNESS
[de-identified] : Ms. Newby returns to the office for a followup.  She continues to have back pain.  She has done 2 months of physical therapy with temporary relief.

## 2019-08-22 ENCOUNTER — CHART COPY (OUTPATIENT)
Age: 44
End: 2019-08-22

## 2019-08-22 ENCOUNTER — FORM ENCOUNTER (OUTPATIENT)
Age: 44
End: 2019-08-22

## 2019-08-23 ENCOUNTER — OUTPATIENT (OUTPATIENT)
Dept: OUTPATIENT SERVICES | Facility: HOSPITAL | Age: 44
LOS: 1 days | End: 2019-08-23
Payer: COMMERCIAL

## 2019-08-23 ENCOUNTER — APPOINTMENT (OUTPATIENT)
Dept: MRI IMAGING | Facility: CLINIC | Age: 44
End: 2019-08-23
Payer: COMMERCIAL

## 2019-08-23 DIAGNOSIS — S39.012A STRAIN OF MUSCLE, FASCIA AND TENDON OF LOWER BACK, INITIAL ENCOUNTER: ICD-10-CM

## 2019-08-23 PROCEDURE — 72148 MRI LUMBAR SPINE W/O DYE: CPT

## 2019-08-23 PROCEDURE — 72148 MRI LUMBAR SPINE W/O DYE: CPT | Mod: 26

## 2019-09-11 ENCOUNTER — APPOINTMENT (OUTPATIENT)
Dept: ORTHOPEDIC SURGERY | Facility: CLINIC | Age: 44
End: 2019-09-11
Payer: COMMERCIAL

## 2019-09-11 PROCEDURE — 99214 OFFICE O/P EST MOD 30 MIN: CPT

## 2019-09-11 NOTE — DISCUSSION/SUMMARY
[de-identified] : Physical therapy has failed to alleviate her symptoms. She will be further evaluated by pain management for possible injection based therapies

## 2019-09-11 NOTE — PHYSICAL EXAM
[Antalgic] : antalgic [de-identified] : Examination of the lumbar spine reveals no midline tenderness palpation, step-offs, or skin lesions. Decreased range of motion with respect to flexion, extension, lateral bending, and rotation. No tenderness to palpation of the sciatic notch. No tenderness palpation of the bilateral greater trochanters. No pain with passive internal/external rotation of the hips. No instability of bilateral lower extremities.  Negative SEBASTIAN. Negative straight leg raise bilaterally. No bowstring. Negative femoral stretch. 5 out of 5 iliopsoas, hip abductors, hips adductors, quadriceps, hamstrings, gastrocsoleus, tibialis anterior, extensor hallucis longus, peroneals. Grossly intact sensation to light touch bilateral lower extremities. 1+ patellar and Achilles reflexes. Downgoing Babinski. No clonus. Intact proprioception. Palpable pulses. No skin lesion and no edema on the right and left lower extremities. [de-identified] : AP lateral lumbar systems shows mild spondylosis without gross instability or aggressive lesions.\par \par Her spine MRI reveals degenerative changes L4-S1 without high grade neural compression

## 2019-09-11 NOTE — HISTORY OF PRESENT ILLNESS
[de-identified] : Ms. Newby returns to the office for a followup.  She continues to have back pain that she feels is getting worse. She will take advil for the symptoms with minimal relief.  She is here to review her MRI results.

## 2019-10-02 ENCOUNTER — APPOINTMENT (OUTPATIENT)
Dept: NEUROSURGERY | Facility: CLINIC | Age: 44
End: 2019-10-02
Payer: COMMERCIAL

## 2019-10-02 VITALS
DIASTOLIC BLOOD PRESSURE: 99 MMHG | WEIGHT: 160 LBS | BODY MASS INDEX: 29.44 KG/M2 | SYSTOLIC BLOOD PRESSURE: 144 MMHG | HEART RATE: 107 BPM | HEIGHT: 62 IN

## 2019-10-02 DIAGNOSIS — S39.012A STRAIN OF MUSCLE, FASCIA AND TENDON OF LOWER BACK, INITIAL ENCOUNTER: ICD-10-CM

## 2019-10-02 PROCEDURE — 99204 OFFICE O/P NEW MOD 45 MIN: CPT

## 2019-10-02 NOTE — PHYSICAL EXAM
[General Appearance - Alert] : alert [Mood] : the mood was normal [Motor Strength] : muscle strength was normal in all four extremities [Sensation Tactile Decrease] : light touch was intact [2+] : Ankle jerk left 2+ [Straight-Leg Raise Test - Left] : straight leg raise of the left leg was negative [Able to toe walk] : the patient was able to toe walk [Straight-Leg Raise Test - Right] : straight leg raise  of the right leg was negative [Able to heel walk] : the patient was able to heel walk [No Spinal Tenderness] : no spinal tenderness [] : no rash

## 2019-10-02 NOTE — DATA REVIEWED
[de-identified] : MRI lumbar spine done 8/23/19 showed evidence of Mild lumbar spondylosis without significant central canal or foraminal narrowing

## 2019-10-02 NOTE — ASSESSMENT
[FreeTextEntry1] : 44 year old female with low back pain and myofascial pain.  She does not appear to be a candidate for epidural injections at this time.  I have advised her to continue with PT and return to see me at the earliest sign that her pain worsens for further treatment as necessary.

## 2019-10-02 NOTE — CONSULT LETTER
[Dear  ___] : Dear ~REBEKAH, [Please see my note below.] : Please see my note below. [Consult Letter:] : I had the pleasure of evaluating your patient, [unfilled]. [Consult Closing:] : Thank you very much for allowing me to participate in the care of this patient.  If you have any questions, please do not hesitate to contact me. [Sincerely,] : Sincerely, [FreeTextEntry3] : Jalyn [FreeTextEntry2] : Yemi Burger MD\par [address]. [address]\[place] [address]

## 2019-10-02 NOTE — HISTORY OF PRESENT ILLNESS
[Back] : back [___ mths] : [unfilled] month(s) ago [7] : a current pain level of 7/10 [9] : a maximum pain level of 9/10 [Aching] : aching [Standing] : standing [Laying] : laying [Sitting] : sitting [Insomnia] : insomnia [Gait Dysfunction] : gait dysfunction [PT] : PT

## 2019-12-17 ENCOUNTER — EMERGENCY (EMERGENCY)
Facility: HOSPITAL | Age: 44
LOS: 0 days | Discharge: HOME | End: 2019-12-18
Attending: EMERGENCY MEDICINE
Payer: MEDICAID

## 2019-12-17 VITALS
RESPIRATION RATE: 18 BRPM | HEART RATE: 105 BPM | WEIGHT: 167.99 LBS | TEMPERATURE: 98 F | DIASTOLIC BLOOD PRESSURE: 107 MMHG | HEIGHT: 62 IN | OXYGEN SATURATION: 97 % | SYSTOLIC BLOOD PRESSURE: 147 MMHG

## 2019-12-17 PROCEDURE — 99285 EMERGENCY DEPT VISIT HI MDM: CPT

## 2019-12-17 NOTE — ED ADULT TRIAGE NOTE - CHIEF COMPLAINT QUOTE
Pt presents with left sided ear/jaw swelling and pain. Has been on abx but it has gotten worse. Hx of this happening on the right side. Pt presents with left sided ear/jaw swelling and pain. Has been on abx but it has gotten worse. Hx of this happening on the right side. Also c/o pain to a possible keloid on her manubrium

## 2019-12-17 NOTE — ED ADULT NURSE NOTE - OBJECTIVE STATEMENT
pt received to bed 25 c/o left jaw pain starting 3 weeks ago. pt states she usually has this pain on the right jaw. pt saw her pcp and received antibiotics but is still in pain. pt denies chewing gum or being under a lot of stress. denies: difficulty breathing

## 2019-12-17 NOTE — ED ADULT NURSE NOTE - CHIEF COMPLAINT QUOTE
Pt presents with left sided ear/jaw swelling and pain. Has been on abx but it has gotten worse. Hx of this happening on the right side. Also c/o pain to a possible keloid on her manubrium

## 2019-12-17 NOTE — ED ADULT NURSE NOTE - ED STAT RN HANDOFF DETAILS
Report endorsed to oncoming RN Shawanda. Safety checks compld this shift/Safety rounds completed hourly.  IV sites checked Q2+remains WDL. Meds given as ord with no s/s of adverse RXNs. Fall +skin precs in place. Any issues endorsed to oncoming RN for follow up. awaiting dc papers

## 2019-12-18 VITALS
OXYGEN SATURATION: 100 % | SYSTOLIC BLOOD PRESSURE: 145 MMHG | HEART RATE: 76 BPM | DIASTOLIC BLOOD PRESSURE: 95 MMHG | TEMPERATURE: 99 F | RESPIRATION RATE: 18 BRPM

## 2019-12-18 LAB
ALBUMIN SERPL ELPH-MCNC: 3.2 G/DL — LOW (ref 3.3–5)
ALP SERPL-CCNC: 69 U/L — SIGNIFICANT CHANGE UP (ref 40–120)
ALT FLD-CCNC: 18 U/L — SIGNIFICANT CHANGE UP (ref 12–78)
ANION GAP SERPL CALC-SCNC: 4 MMOL/L — LOW (ref 5–17)
AST SERPL-CCNC: 13 U/L — LOW (ref 15–37)
BASOPHILS # BLD AUTO: 0.06 K/UL — SIGNIFICANT CHANGE UP (ref 0–0.2)
BASOPHILS NFR BLD AUTO: 0.6 % — SIGNIFICANT CHANGE UP (ref 0–2)
BILIRUB SERPL-MCNC: 0.3 MG/DL — SIGNIFICANT CHANGE UP (ref 0.2–1.2)
BUN SERPL-MCNC: 7 MG/DL — SIGNIFICANT CHANGE UP (ref 7–23)
CALCIUM SERPL-MCNC: 9.1 MG/DL — SIGNIFICANT CHANGE UP (ref 8.5–10.1)
CHLORIDE SERPL-SCNC: 106 MMOL/L — SIGNIFICANT CHANGE UP (ref 96–108)
CO2 SERPL-SCNC: 28 MMOL/L — SIGNIFICANT CHANGE UP (ref 22–31)
CREAT SERPL-MCNC: 0.63 MG/DL — SIGNIFICANT CHANGE UP (ref 0.5–1.3)
EOSINOPHIL # BLD AUTO: 0.14 K/UL — SIGNIFICANT CHANGE UP (ref 0–0.5)
EOSINOPHIL NFR BLD AUTO: 1.5 % — SIGNIFICANT CHANGE UP (ref 0–6)
GLUCOSE SERPL-MCNC: 86 MG/DL — SIGNIFICANT CHANGE UP (ref 70–99)
HCG SERPL-ACNC: <1 MIU/ML — SIGNIFICANT CHANGE UP
HCT VFR BLD CALC: 38.9 % — SIGNIFICANT CHANGE UP (ref 34.5–45)
HGB BLD-MCNC: 12.2 G/DL — SIGNIFICANT CHANGE UP (ref 11.5–15.5)
IMM GRANULOCYTES NFR BLD AUTO: 0.4 % — SIGNIFICANT CHANGE UP (ref 0–1.5)
LYMPHOCYTES # BLD AUTO: 3.89 K/UL — HIGH (ref 1–3.3)
LYMPHOCYTES # BLD AUTO: 40.7 % — SIGNIFICANT CHANGE UP (ref 13–44)
MCHC RBC-ENTMCNC: 28 PG — SIGNIFICANT CHANGE UP (ref 27–34)
MCHC RBC-ENTMCNC: 31.4 GM/DL — LOW (ref 32–36)
MCV RBC AUTO: 89.4 FL — SIGNIFICANT CHANGE UP (ref 80–100)
MONOCYTES # BLD AUTO: 0.6 K/UL — SIGNIFICANT CHANGE UP (ref 0–0.9)
MONOCYTES NFR BLD AUTO: 6.3 % — SIGNIFICANT CHANGE UP (ref 2–14)
NEUTROPHILS # BLD AUTO: 4.83 K/UL — SIGNIFICANT CHANGE UP (ref 1.8–7.4)
NEUTROPHILS NFR BLD AUTO: 50.5 % — SIGNIFICANT CHANGE UP (ref 43–77)
NRBC # BLD: 0 /100 WBCS — SIGNIFICANT CHANGE UP (ref 0–0)
PLATELET # BLD AUTO: 237 K/UL — SIGNIFICANT CHANGE UP (ref 150–400)
POTASSIUM SERPL-MCNC: 4.2 MMOL/L — SIGNIFICANT CHANGE UP (ref 3.5–5.3)
POTASSIUM SERPL-SCNC: 4.2 MMOL/L — SIGNIFICANT CHANGE UP (ref 3.5–5.3)
PROT SERPL-MCNC: 7.5 GM/DL — SIGNIFICANT CHANGE UP (ref 6–8.3)
RBC # BLD: 4.35 M/UL — SIGNIFICANT CHANGE UP (ref 3.8–5.2)
RBC # FLD: 14.1 % — SIGNIFICANT CHANGE UP (ref 10.3–14.5)
SODIUM SERPL-SCNC: 138 MMOL/L — SIGNIFICANT CHANGE UP (ref 135–145)
WBC # BLD: 9.56 K/UL — SIGNIFICANT CHANGE UP (ref 3.8–10.5)
WBC # FLD AUTO: 9.56 K/UL — SIGNIFICANT CHANGE UP (ref 3.8–10.5)

## 2019-12-18 PROCEDURE — 70487 CT MAXILLOFACIAL W/DYE: CPT | Mod: 26

## 2019-12-18 PROCEDURE — 70450 CT HEAD/BRAIN W/O DYE: CPT | Mod: 26

## 2019-12-18 RX ORDER — FAMOTIDINE 10 MG/ML
1 INJECTION INTRAVENOUS
Qty: 10 | Refills: 0
Start: 2019-12-18 | End: 2019-12-22

## 2019-12-18 RX ORDER — KETOROLAC TROMETHAMINE 30 MG/ML
15 SYRINGE (ML) INJECTION ONCE
Refills: 0 | Status: DISCONTINUED | OUTPATIENT
Start: 2019-12-18 | End: 2019-12-18

## 2019-12-18 RX ORDER — MORPHINE SULFATE 50 MG/1
4 CAPSULE, EXTENDED RELEASE ORAL ONCE
Refills: 0 | Status: DISCONTINUED | OUTPATIENT
Start: 2019-12-18 | End: 2019-12-18

## 2019-12-18 RX ORDER — IBUPROFEN 200 MG
1 TABLET ORAL
Qty: 15 | Refills: 0
Start: 2019-12-18 | End: 2019-12-22

## 2019-12-18 RX ORDER — SODIUM CHLORIDE 9 MG/ML
1000 INJECTION INTRAMUSCULAR; INTRAVENOUS; SUBCUTANEOUS ONCE
Refills: 0 | Status: COMPLETED | OUTPATIENT
Start: 2019-12-18 | End: 2019-12-18

## 2019-12-18 RX ADMIN — Medication 15 MILLIGRAM(S): at 04:11

## 2019-12-18 RX ADMIN — Medication 15 MILLIGRAM(S): at 04:26

## 2019-12-18 RX ADMIN — MORPHINE SULFATE 4 MILLIGRAM(S): 50 CAPSULE, EXTENDED RELEASE ORAL at 04:11

## 2019-12-18 RX ADMIN — SODIUM CHLORIDE 1000 MILLILITER(S): 9 INJECTION INTRAMUSCULAR; INTRAVENOUS; SUBCUTANEOUS at 03:20

## 2019-12-18 RX ADMIN — SODIUM CHLORIDE 1000 MILLILITER(S): 9 INJECTION INTRAMUSCULAR; INTRAVENOUS; SUBCUTANEOUS at 02:20

## 2019-12-18 RX ADMIN — MORPHINE SULFATE 4 MILLIGRAM(S): 50 CAPSULE, EXTENDED RELEASE ORAL at 04:26

## 2019-12-18 NOTE — ED PROVIDER NOTE - PATIENT PORTAL LINK FT
You can access the FollowMyHealth Patient Portal offered by [facility] by registering at the following website: [email or web address]. By joining [facility]’s FollowMyHealth portal, you will also be able to view your health information using other applications (apps) compatible with our system.

## 2019-12-18 NOTE — ED PROVIDER NOTE - CLINICAL SUMMARY MEDICAL DECISION MAKING FREE TEXT BOX
Patient with left jaw, ear pain for 3 weeks.  VSS.  Patient well appearing, normal cranial nerves on exam.  CT head and CT maxillofacial do not identify infectious source of pain.  Patient reports pain now resolved.  Will treat with NSAIDs, give ENT follow up, do not see indication for antibiotics at this time, more likely patient has TMJ.  Discussed results and outcome of today's visit with the patient.  Patient advised to please follow up with another healthcare provider within the next 24 hours and return to the Emergency Department for worsening symptoms or any other concerns.  Patient advised that their doctor may call  to follow up on the specific results of the tests performed today in the emergency department.   Patient appears well on discharge.

## 2019-12-18 NOTE — ED PROVIDER NOTE - OBJECTIVE STATEMENT
Pertinent PMH/PSH/FHx/SHx and Review of Systems contained within:  Patient presents to the ED for pain of left jaw.  Patient reports pain in the left jaw and left ear for 3 weeks now radiating up to her temple.  Denies any assc fever, dizziness, hearing loss, drainage from ear, dental pain, eye pain, or vision changes.  Says that she's had difficulty with infections in her right ear.  She was treated with an antibiotic by PMD, however states no changes.      Relevant PMHx/SHx/SOCHx/FAMH:  right ear infections, keloids  Patient denies EtOH/tobacco/illicit substance use.    ROS: No fever/chills, No headache/photophobia/eye pain/changes in vision, No sore throat/dysphagia, No chest pain/palpitations, no SOB/cough/wheeze/stridor, No abdominal pain, No N/V/D/melena, no dysuria/frequency/discharge, No neck/back pain, no rash, no changes in neurological status/function. Pertinent PMH/PSH/FHx/SHx and Review of Systems contained within:  Patient presents to the ED for pain of left jaw.  Patient reports pain in the left jaw and left ear for 3 weeks radiating up to her temple.  Pain has been stable for the last 3 weeks but persistent, pain worse when she moves jaw or palpates the area.  Denies any assc fever, dizziness, hearing loss, drainage from ear, dental pain, eye pain, or vision changes.  Says that she's had difficulty with infections in her right ear.  She was treated with an antibiotic by PMD, however states no changes.      Relevant PMHx/SHx/SOCHx/FAMH:  right ear infections, keloids  Patient denies EtOH/tobacco/illicit substance use.    ROS: No fever/chills, No headache/photophobia/eye pain/changes in vision, No sore throat/dysphagia, No chest pain/palpitations, no SOB/cough/wheeze/stridor, No abdominal pain, No N/V/D/melena, no dysuria/frequency/discharge, No neck/back pain, no rash, no changes in neurological status/function.

## 2019-12-18 NOTE — ED PROVIDER NOTE - PHYSICAL EXAMINATION
Gen: Alert, mild distress, well appearing  Head: NC, AT, PERRL, EOMI, normal lids/conjunctiva  ENT: normal hearing, patent oropharynx without erythema/exudate, uvula midline, TMs and ear canals BL normal, left ear tender to palp anterior to left ear  Neck: +supple, no JVD, +Trachea midline, no lymphadenopathy  Pulm: Bilateral BS, normal resp effort, no wheeze/stridor/retractions  CV: RRR, no M/R/G, +dist pulses  Abd: soft, NT/ND, Negative [place] signs, +BS, no palpable masses  Mskel: no edema/erythema/cyanosis  Skin: no rash, warm/dry  Neuro: AAOx3, no apparent sensory/motor deficits, coordination intact Gen: Alert, mild distress, well appearing  Head: NC, AT, PERRL, EOMI, normal lids/conjunctiva  ENT: normal hearing, patent oropharynx without erythema/exudate, uvula midline, TMs and ear canals BL normal, left jaw tender to palp anterior to left ear  Neck: +supple, no JVD, +Trachea midline, no lymphadenopathy  Pulm: Bilateral BS, normal resp effort, no wheeze/stridor/retractions  CV: RRR, no M/R/G, +dist pulses  Abd: soft, NT/ND, Negative [place] signs, +BS, no palpable masses  Mskel: no edema/erythema/cyanosis  Skin: no rash, warm/dry  Neuro: AAOx3, no apparent sensory/motor deficits, coordination intact

## 2019-12-19 DIAGNOSIS — H92.02 OTALGIA, LEFT EAR: ICD-10-CM

## 2019-12-19 DIAGNOSIS — R68.84 JAW PAIN: ICD-10-CM

## 2020-07-17 NOTE — HISTORY OF PRESENT ILLNESS
[de-identified] : The patient returns for followup today per symptoms are the same. She has not had any treatment.
never used

## 2020-12-30 ENCOUNTER — APPOINTMENT (OUTPATIENT)
Dept: ORTHOPEDIC SURGERY | Facility: CLINIC | Age: 45
End: 2020-12-30
Payer: COMMERCIAL

## 2020-12-30 VITALS — TEMPERATURE: 96.6 F

## 2020-12-30 DIAGNOSIS — M51.36 OTHER INTERVERTEBRAL DISC DEGENERATION, LUMBAR REGION: ICD-10-CM

## 2020-12-30 PROCEDURE — 99214 OFFICE O/P EST MOD 30 MIN: CPT

## 2020-12-30 PROCEDURE — 99072 ADDL SUPL MATRL&STAF TM PHE: CPT

## 2020-12-30 NOTE — HISTORY OF PRESENT ILLNESS
[de-identified] : Ms. Newby returns to the office for a followup.  She continues to have low back pain that is not improving.

## 2020-12-30 NOTE — DISCUSSION/SUMMARY
[de-identified] : We again discussed further treatment options both nonsurgical and surgical.  She wished to be evaluated by another pain management physician.  She will be referred for this.  She will let me know of any changes or worsening of her symptoms.

## 2020-12-30 NOTE — PHYSICAL EXAM
[Antalgic] : antalgic [de-identified] : Examination of the lumbar spine reveals no midline tenderness palpation, step-offs, or skin lesions. Decreased range of motion with respect to flexion, extension, lateral bending, and rotation. No tenderness to palpation of the sciatic notch. No tenderness palpation of the bilateral greater trochanters. No pain with passive internal/external rotation of the hips. No instability of bilateral lower extremities.  Negative SEBASTIAN. Negative straight leg raise bilaterally. No bowstring. Negative femoral stretch. 5 out of 5 iliopsoas, hip abductors, hips adductors, quadriceps, hamstrings, gastrocsoleus, tibialis anterior, extensor hallucis longus, peroneals. Grossly intact sensation to light touch bilateral lower extremities. 1+ patellar and Achilles reflexes. Downgoing Babinski. No clonus. Intact proprioception. Palpable pulses. No skin lesion and no edema on the right and left lower extremities. [de-identified] : AP lateral lumbar systems shows mild spondylosis without gross instability or aggressive lesions.\par \par Her spine MRI reveals degenerative changes L4-S1 without high grade neural compression

## 2021-04-29 ENCOUNTER — APPOINTMENT (OUTPATIENT)
Dept: GASTROENTEROLOGY | Facility: CLINIC | Age: 46
End: 2021-04-29
Payer: COMMERCIAL

## 2021-04-29 PROCEDURE — 99072 ADDL SUPL MATRL&STAF TM PHE: CPT

## 2021-04-29 PROCEDURE — 99204 OFFICE O/P NEW MOD 45 MIN: CPT

## 2021-04-29 NOTE — PHYSICAL EXAM
[General Appearance - Alert] : alert [General Appearance - In No Acute Distress] : in no acute distress [Sclera] : the sclera and conjunctiva were normal [PERRL With Normal Accommodation] : pupils were equal in size, round, and reactive to light [Extraocular Movements] : extraocular movements were intact [Outer Ear] : the ears and nose were normal in appearance [Oropharynx] : the oropharynx was normal [Normal] : normal [Soft, Nontender] : the abdomen was soft and nontender [No Mass] : no masses were palpated [No HSM] : no hepatosplenomegaly noted [Cervical Lymph Nodes Enlarged Posterior Bilaterally] : posterior cervical [Cervical Lymph Nodes Enlarged Anterior Bilaterally] : anterior cervical [Supraclavicular Lymph Nodes Enlarged Bilaterally] : supraclavicular [Axillary Lymph Nodes Enlarged Bilaterally] : axillary [Femoral Lymph Nodes Enlarged Bilaterally] : femoral [Inguinal Lymph Nodes Enlarged Bilaterally] : inguinal [No CVA Tenderness] : no ~M costovertebral angle tenderness [No Spinal Tenderness] : no spinal tenderness [Abnormal Walk] : normal gait [Nail Clubbing] : no clubbing  or cyanosis of the fingernails [Musculoskeletal - Swelling] : no joint swelling seen [Motor Tone] : muscle strength and tone were normal [Oriented To Time, Place, And Person] : oriented to person, place, and time [Impaired Insight] : insight and judgment were intact [Affect] : the affect was normal

## 2021-05-14 ENCOUNTER — APPOINTMENT (OUTPATIENT)
Dept: GASTROENTEROLOGY | Facility: AMBULATORY MEDICAL SERVICES | Age: 46
End: 2021-05-14
Payer: COMMERCIAL

## 2021-05-14 PROCEDURE — 45378 DIAGNOSTIC COLONOSCOPY: CPT | Mod: 33

## 2021-05-16 LAB — SARS-COV-2 N GENE NPH QL NAA+PROBE: NOT DETECTED

## 2021-05-22 PROBLEM — Z76.89 ESTABLISHING CARE WITH NEW DOCTOR, ENCOUNTER FOR: Status: ACTIVE | Noted: 2021-05-22

## 2021-05-24 ENCOUNTER — APPOINTMENT (OUTPATIENT)
Dept: GYNECOLOGIC ONCOLOGY | Facility: CLINIC | Age: 46
End: 2021-05-24
Payer: COMMERCIAL

## 2021-05-24 VITALS
WEIGHT: 173 LBS | HEIGHT: 62 IN | SYSTOLIC BLOOD PRESSURE: 147 MMHG | OXYGEN SATURATION: 97 % | TEMPERATURE: 97.2 F | DIASTOLIC BLOOD PRESSURE: 96 MMHG | HEART RATE: 84 BPM | BODY MASS INDEX: 31.83 KG/M2

## 2021-05-24 DIAGNOSIS — Z76.89 PERSONS ENCOUNTERING HEALTH SERVICES IN OTHER SPECIFIED CIRCUMSTANCES: ICD-10-CM

## 2021-05-24 PROCEDURE — 99072 ADDL SUPL MATRL&STAF TM PHE: CPT

## 2021-05-24 PROCEDURE — 99205 OFFICE O/P NEW HI 60 MIN: CPT | Mod: 25

## 2021-05-24 PROCEDURE — 57454 BX/CURETT OF CERVIX W/SCOPE: CPT

## 2021-05-28 ENCOUNTER — OUTPATIENT (OUTPATIENT)
Dept: OUTPATIENT SERVICES | Facility: HOSPITAL | Age: 46
LOS: 1 days | End: 2021-05-28

## 2021-05-28 ENCOUNTER — RESULT REVIEW (OUTPATIENT)
Age: 46
End: 2021-05-28

## 2021-05-28 DIAGNOSIS — R87.619 UNSPECIFIED ABNORMAL CYTOLOGICAL FINDINGS IN SPECIMENS FROM CERVIX UTERI: ICD-10-CM

## 2021-06-25 ENCOUNTER — NON-APPOINTMENT (OUTPATIENT)
Age: 46
End: 2021-06-25

## 2021-07-02 ENCOUNTER — NON-APPOINTMENT (OUTPATIENT)
Age: 46
End: 2021-07-02

## 2021-08-21 ENCOUNTER — NON-APPOINTMENT (OUTPATIENT)
Age: 46
End: 2021-08-21

## 2021-10-08 LAB — CORE LAB BIOPSY: NORMAL

## 2021-11-17 ENCOUNTER — APPOINTMENT (OUTPATIENT)
Dept: GYNECOLOGIC ONCOLOGY | Facility: CLINIC | Age: 46
End: 2021-11-17
Payer: COMMERCIAL

## 2021-11-17 VITALS
SYSTOLIC BLOOD PRESSURE: 135 MMHG | DIASTOLIC BLOOD PRESSURE: 95 MMHG | BODY MASS INDEX: 31.83 KG/M2 | HEART RATE: 106 BPM | WEIGHT: 173 LBS | HEIGHT: 62 IN

## 2021-11-17 DIAGNOSIS — R87.619 UNSPECIFIED ABNORMAL CYTOLOGICAL FINDINGS IN SPECIMENS FROM CERVIX UTERI: ICD-10-CM

## 2021-11-17 DIAGNOSIS — R87.810 ATYPICAL SQUAMOUS CELLS OF UNDETERMINED SIGNIFICANCE ON CYTOLOGIC SMEAR OF CERVIX (ASC-US): ICD-10-CM

## 2021-11-17 DIAGNOSIS — Z09 ENCOUNTER FOR FOLLOW-UP EXAMINATION AFTER COMPLETED TREATMENT FOR CONDITIONS OTHER THAN MALIGNANT NEOPLASM: ICD-10-CM

## 2021-11-17 DIAGNOSIS — R87.610 ATYPICAL SQUAMOUS CELLS OF UNDETERMINED SIGNIFICANCE ON CYTOLOGIC SMEAR OF CERVIX (ASC-US): ICD-10-CM

## 2021-11-17 PROCEDURE — 99213 OFFICE O/P EST LOW 20 MIN: CPT

## 2021-11-17 RX ORDER — METOPROLOL TARTRATE 75 MG/1
TABLET, FILM COATED ORAL
Refills: 0 | Status: ACTIVE | COMMUNITY

## 2021-11-17 RX ORDER — LISINOPRIL 10 MG/1
10 TABLET ORAL
Refills: 0 | Status: ACTIVE | COMMUNITY

## 2021-11-17 RX ORDER — SODIUM SULFATE, POTASSIUM SULFATE, MAGNESIUM SULFATE 17.5; 3.13; 1.6 G/ML; G/ML; G/ML
17.5-3.13-1.6 SOLUTION, CONCENTRATE ORAL
Qty: 1 | Refills: 0 | Status: DISCONTINUED | COMMUNITY
Start: 2021-04-29 | End: 2021-11-17

## 2021-11-17 RX ORDER — CHLORHEXIDINE GLUCONATE 4 %
325 (65 FE) LIQUID (ML) TOPICAL
Refills: 0 | Status: DISCONTINUED | COMMUNITY
End: 2021-11-17

## 2021-11-17 RX ORDER — BUTALBITAL AND ACETAMINOPHEN 300; 50 MG/1; MG/1
50-300 TABLET ORAL
Refills: 0 | Status: ACTIVE | COMMUNITY

## 2021-11-23 LAB — HPV HIGH+LOW RISK DNA PNL CVX: NOT DETECTED

## 2021-12-07 LAB — CYTOLOGY CVX/VAG DOC THIN PREP: NORMAL

## 2022-11-16 ENCOUNTER — APPOINTMENT (OUTPATIENT)
Dept: GYNECOLOGIC ONCOLOGY | Facility: CLINIC | Age: 47
End: 2022-11-16

## 2022-11-17 ENCOUNTER — EMERGENCY (EMERGENCY)
Facility: HOSPITAL | Age: 47
LOS: 0 days | Discharge: HOME | End: 2022-11-17
Attending: STUDENT IN AN ORGANIZED HEALTH CARE EDUCATION/TRAINING PROGRAM

## 2022-11-17 VITALS
RESPIRATION RATE: 16 BRPM | SYSTOLIC BLOOD PRESSURE: 152 MMHG | HEIGHT: 62 IN | OXYGEN SATURATION: 98 % | TEMPERATURE: 98 F | DIASTOLIC BLOOD PRESSURE: 101 MMHG | WEIGHT: 171.08 LBS | HEART RATE: 103 BPM

## 2022-11-17 VITALS
RESPIRATION RATE: 16 BRPM | OXYGEN SATURATION: 100 % | TEMPERATURE: 98 F | SYSTOLIC BLOOD PRESSURE: 142 MMHG | HEART RATE: 95 BPM | DIASTOLIC BLOOD PRESSURE: 89 MMHG

## 2022-11-17 DIAGNOSIS — I10 ESSENTIAL (PRIMARY) HYPERTENSION: ICD-10-CM

## 2022-11-17 DIAGNOSIS — R51.9 HEADACHE, UNSPECIFIED: ICD-10-CM

## 2022-11-17 PROCEDURE — 70450 CT HEAD/BRAIN W/O DYE: CPT | Mod: 26,MA

## 2022-11-17 PROCEDURE — 99284 EMERGENCY DEPT VISIT MOD MDM: CPT

## 2022-11-17 RX ORDER — ACETAMINOPHEN 500 MG
975 TABLET ORAL ONCE
Refills: 0 | Status: COMPLETED | OUTPATIENT
Start: 2022-11-17 | End: 2022-11-17

## 2022-11-17 RX ORDER — LOSARTAN POTASSIUM 100 MG/1
0 TABLET, FILM COATED ORAL
Qty: 0 | Refills: 0 | DISCHARGE

## 2022-11-17 RX ORDER — SODIUM CHLORIDE 9 MG/ML
1000 INJECTION INTRAMUSCULAR; INTRAVENOUS; SUBCUTANEOUS ONCE
Refills: 0 | Status: COMPLETED | OUTPATIENT
Start: 2022-11-17 | End: 2022-11-17

## 2022-11-17 RX ORDER — METOCLOPRAMIDE HCL 10 MG
10 TABLET ORAL ONCE
Refills: 0 | Status: COMPLETED | OUTPATIENT
Start: 2022-11-17 | End: 2022-11-17

## 2022-11-17 RX ADMIN — SODIUM CHLORIDE 1000 MILLILITER(S): 9 INJECTION INTRAMUSCULAR; INTRAVENOUS; SUBCUTANEOUS at 13:13

## 2022-11-17 RX ADMIN — Medication 975 MILLIGRAM(S): at 13:13

## 2022-11-17 RX ADMIN — Medication 10 MILLIGRAM(S): at 13:13

## 2022-11-17 NOTE — ED PROVIDER NOTE - CLINICAL SUMMARY MEDICAL DECISION MAKING FREE TEXT BOX
Likely migraine headache, will obtain CT head given duration of headache, pain control IV fluids and reassess.  Dental exam with noted poor dentition, no gingival abscess or signs of infection patient will need to follow-up with outpatient dental clinic.

## 2022-11-17 NOTE — ED PROVIDER NOTE - CARE PROVIDER_API CALL
Jade Domínguez  NEUROLOGY  [address] [place] [address]  Phone: [phone]  Fax: [phone]  Follow Up Time: 4-6 Days

## 2022-11-17 NOTE — ED PROVIDER NOTE - PATIENT PORTAL LINK FT
You can access the FollowMyHealth Patient Portal offered by [facility] by registering at the following website: [email or web address]. By joining [facility]’s FollowMyHealth portal, you will also be able to view your health information using other applications (apps) compatible with our system.

## 2022-11-17 NOTE — ED ADULT TRIAGE NOTE - CHIEF COMPLAINT QUOTE
as per patient c/o headache, L dental pain, and L sided neck pain x 2 weeks  Hx: Migraines, HTN on Lisinopril and Metoprolol

## 2022-11-17 NOTE — ED PROVIDER NOTE - PHYSICAL EXAMINATION
VITAL SIGNS: I have reviewed nursing notes and confirm.  CONSTITUTIONAL: well-appearing, non-toxic, NAD  SKIN: Warm dry, normal skin turgor  HEAD: NCAT  EYES: EOMI, PERRLA, no scleral icterus  ENT: Moist mucous membranes, normal pharynx with no erythema or exudates  NECK: Supple; non tender. Full ROM. No cervical LAD  CARD: RRR, no murmurs, rubs or gallops  RESP: clear to ausculation b/l.  No rales, rhonchi, or wheezing.  EXT: Full ROM, no bony tenderness, no pedal edema, no calf tenderness  NEURO: normal motor. normal sensory. CN II-XII intact. Cerebellar testing normal. Normal gait.  PSYCH: Cooperative, appropriate.

## 2022-11-17 NOTE — ED PROVIDER NOTE - NS ED ROS FT
Constitutional: See HPI.  Eyes: No visual changes, eye pain or discharge. No Photophobia  MS: No myalgia, muscle weakness, joint pain or back pain.  Neuro: see hpi   Skin: No skin rash.  Except as documented in the HPI, all other systems are negative.

## 2022-11-17 NOTE — ED PROVIDER NOTE - OBJECTIVE STATEMENT
47-year-old female with history of migraines, hypertension presenting to the ED with complaints of headache that started right-sided for several days alleviated with over-the-counter Excedrin now left-sided headache for several days without associated nausea vomiting photophobia or phonophobia.  Patient denies any neck pain although noted in triage note, complains of left dental pain for the past several weeks.  Patient has not followed with outpatient dentist.  Patient denies any fever chills or other complaints.

## 2022-11-23 ENCOUNTER — APPOINTMENT (OUTPATIENT)
Dept: GYNECOLOGIC ONCOLOGY | Facility: CLINIC | Age: 47
End: 2022-11-23

## 2022-11-23 PROBLEM — I10 ESSENTIAL (PRIMARY) HYPERTENSION: Chronic | Status: ACTIVE | Noted: 2022-11-17

## 2022-11-28 ENCOUNTER — NON-APPOINTMENT (OUTPATIENT)
Age: 47
End: 2022-11-28

## 2022-12-01 ENCOUNTER — APPOINTMENT (OUTPATIENT)
Dept: GYNECOLOGIC ONCOLOGY | Facility: CLINIC | Age: 47
End: 2022-12-01

## 2022-12-27 ENCOUNTER — NON-APPOINTMENT (OUTPATIENT)
Age: 47
End: 2022-12-27

## 2023-02-27 NOTE — ED ADULT NURSE NOTE - CAS ELECT INFOMATION PROVIDED
Uses bisoprolol as needed for SBP greater than 150    Patient checks blood pressure at least twice a day at home DC instructions

## 2023-04-07 ENCOUNTER — EMERGENCY (EMERGENCY)
Facility: HOSPITAL | Age: 48
LOS: 0 days | Discharge: ANOTHER HOSPITAL | End: 2023-04-07
Attending: STUDENT IN AN ORGANIZED HEALTH CARE EDUCATION/TRAINING PROGRAM
Payer: COMMERCIAL

## 2023-04-07 ENCOUNTER — EMERGENCY (EMERGENCY)
Facility: HOSPITAL | Age: 48
LOS: 1 days | Discharge: HOME | End: 2023-04-07
Attending: STUDENT IN AN ORGANIZED HEALTH CARE EDUCATION/TRAINING PROGRAM | Admitting: STUDENT IN AN ORGANIZED HEALTH CARE EDUCATION/TRAINING PROGRAM
Payer: COMMERCIAL

## 2023-04-07 VITALS
OXYGEN SATURATION: 96 % | TEMPERATURE: 98 F | HEIGHT: 62 IN | SYSTOLIC BLOOD PRESSURE: 154 MMHG | RESPIRATION RATE: 18 BRPM | HEART RATE: 80 BPM | DIASTOLIC BLOOD PRESSURE: 88 MMHG

## 2023-04-07 VITALS
OXYGEN SATURATION: 100 % | SYSTOLIC BLOOD PRESSURE: 161 MMHG | DIASTOLIC BLOOD PRESSURE: 86 MMHG | HEART RATE: 78 BPM | RESPIRATION RATE: 17 BRPM | TEMPERATURE: 98 F

## 2023-04-07 VITALS
RESPIRATION RATE: 18 BRPM | OXYGEN SATURATION: 97 % | HEART RATE: 89 BPM | TEMPERATURE: 98 F | DIASTOLIC BLOOD PRESSURE: 95 MMHG | WEIGHT: 175.05 LBS | SYSTOLIC BLOOD PRESSURE: 139 MMHG | HEIGHT: 62 IN

## 2023-04-07 DIAGNOSIS — I10 ESSENTIAL (PRIMARY) HYPERTENSION: ICD-10-CM

## 2023-04-07 DIAGNOSIS — H60.91 UNSPECIFIED OTITIS EXTERNA, RIGHT EAR: ICD-10-CM

## 2023-04-07 DIAGNOSIS — H92.01 OTALGIA, RIGHT EAR: ICD-10-CM

## 2023-04-07 DIAGNOSIS — Z20.822 CONTACT WITH AND (SUSPECTED) EXPOSURE TO COVID-19: ICD-10-CM

## 2023-04-07 LAB
ALBUMIN SERPL ELPH-MCNC: 3.5 G/DL — SIGNIFICANT CHANGE UP (ref 3.3–5)
ALP SERPL-CCNC: 75 U/L — SIGNIFICANT CHANGE UP (ref 40–120)
ALT FLD-CCNC: 26 U/L — SIGNIFICANT CHANGE UP (ref 12–78)
ANION GAP SERPL CALC-SCNC: 0 MMOL/L — LOW (ref 5–17)
ANISOCYTOSIS BLD QL: SLIGHT — SIGNIFICANT CHANGE UP
AST SERPL-CCNC: 17 U/L — SIGNIFICANT CHANGE UP (ref 15–37)
BASOPHILS # BLD AUTO: 0.05 K/UL — SIGNIFICANT CHANGE UP (ref 0–0.2)
BASOPHILS NFR BLD AUTO: 0.6 % — SIGNIFICANT CHANGE UP (ref 0–2)
BILIRUB SERPL-MCNC: 0.3 MG/DL — SIGNIFICANT CHANGE UP (ref 0.2–1.2)
BUN SERPL-MCNC: 11 MG/DL — SIGNIFICANT CHANGE UP (ref 7–23)
CALCIUM SERPL-MCNC: 10.5 MG/DL — HIGH (ref 8.5–10.1)
CHLORIDE SERPL-SCNC: 106 MMOL/L — SIGNIFICANT CHANGE UP (ref 96–108)
CO2 SERPL-SCNC: 29 MMOL/L — SIGNIFICANT CHANGE UP (ref 22–31)
CREAT SERPL-MCNC: 0.72 MG/DL — SIGNIFICANT CHANGE UP (ref 0.5–1.3)
EGFR: 104 ML/MIN/1.73M2 — SIGNIFICANT CHANGE UP
EOSINOPHIL # BLD AUTO: 0.18 K/UL — SIGNIFICANT CHANGE UP (ref 0–0.5)
EOSINOPHIL NFR BLD AUTO: 2.2 % — SIGNIFICANT CHANGE UP (ref 0–6)
FLUAV AG NPH QL: SIGNIFICANT CHANGE UP
FLUBV AG NPH QL: SIGNIFICANT CHANGE UP
GLUCOSE SERPL-MCNC: 90 MG/DL — SIGNIFICANT CHANGE UP (ref 70–99)
HCG SERPL-ACNC: <1 MIU/ML — SIGNIFICANT CHANGE UP
HCT VFR BLD CALC: 40.1 % — SIGNIFICANT CHANGE UP (ref 34.5–45)
HGB BLD-MCNC: 12.1 G/DL — SIGNIFICANT CHANGE UP (ref 11.5–15.5)
IMM GRANULOCYTES NFR BLD AUTO: 0.4 % — SIGNIFICANT CHANGE UP (ref 0–0.9)
LYMPHOCYTES # BLD AUTO: 2.97 K/UL — SIGNIFICANT CHANGE UP (ref 1–3.3)
LYMPHOCYTES # BLD AUTO: 36.9 % — SIGNIFICANT CHANGE UP (ref 13–44)
MANUAL SMEAR VERIFICATION: SIGNIFICANT CHANGE UP
MCHC RBC-ENTMCNC: 24.3 PG — LOW (ref 27–34)
MCHC RBC-ENTMCNC: 30.2 G/DL — LOW (ref 32–36)
MCV RBC AUTO: 80.5 FL — SIGNIFICANT CHANGE UP (ref 80–100)
MONOCYTES # BLD AUTO: 0.56 K/UL — SIGNIFICANT CHANGE UP (ref 0–0.9)
MONOCYTES NFR BLD AUTO: 7 % — SIGNIFICANT CHANGE UP (ref 2–14)
NEUTROPHILS # BLD AUTO: 4.26 K/UL — SIGNIFICANT CHANGE UP (ref 1.8–7.4)
NEUTROPHILS NFR BLD AUTO: 52.9 % — SIGNIFICANT CHANGE UP (ref 43–77)
NRBC # BLD: 0 /100 WBCS — SIGNIFICANT CHANGE UP (ref 0–0)
PLAT MORPH BLD: NORMAL — SIGNIFICANT CHANGE UP
PLATELET # BLD AUTO: 302 K/UL — SIGNIFICANT CHANGE UP (ref 150–400)
POTASSIUM SERPL-MCNC: 4.4 MMOL/L — SIGNIFICANT CHANGE UP (ref 3.5–5.3)
POTASSIUM SERPL-SCNC: 4.4 MMOL/L — SIGNIFICANT CHANGE UP (ref 3.5–5.3)
PROT SERPL-MCNC: 7.9 GM/DL — SIGNIFICANT CHANGE UP (ref 6–8.3)
RBC # BLD: 4.98 M/UL — SIGNIFICANT CHANGE UP (ref 3.8–5.2)
RBC # FLD: 21.8 % — HIGH (ref 10.3–14.5)
RBC BLD AUTO: ABNORMAL
S PYO DNA THROAT QL NAA+PROBE: SIGNIFICANT CHANGE UP
SARS-COV-2 RNA SPEC QL NAA+PROBE: SIGNIFICANT CHANGE UP
SODIUM SERPL-SCNC: 135 MMOL/L — SIGNIFICANT CHANGE UP (ref 135–145)
WBC # BLD: 8.05 K/UL — SIGNIFICANT CHANGE UP (ref 3.8–10.5)
WBC # FLD AUTO: 8.05 K/UL — SIGNIFICANT CHANGE UP (ref 3.8–10.5)

## 2023-04-07 PROCEDURE — L9997: CPT

## 2023-04-07 PROCEDURE — 99285 EMERGENCY DEPT VISIT HI MDM: CPT

## 2023-04-07 PROCEDURE — 70487 CT MAXILLOFACIAL W/DYE: CPT | Mod: 26,MA

## 2023-04-07 RX ORDER — CIPROFLOXACIN LACTATE 400MG/40ML
1 VIAL (ML) INTRAVENOUS
Qty: 10 | Refills: 0
Start: 2023-04-07 | End: 2023-04-11

## 2023-04-07 RX ORDER — ACETAMINOPHEN 500 MG
975 TABLET ORAL ONCE
Refills: 0 | Status: COMPLETED | OUTPATIENT
Start: 2023-04-07 | End: 2023-04-07

## 2023-04-07 RX ORDER — SODIUM CHLORIDE 9 MG/ML
1000 INJECTION INTRAMUSCULAR; INTRAVENOUS; SUBCUTANEOUS ONCE
Refills: 0 | Status: COMPLETED | OUTPATIENT
Start: 2023-04-07 | End: 2023-04-07

## 2023-04-07 RX ORDER — KETOROLAC TROMETHAMINE 30 MG/ML
15 SYRINGE (ML) INJECTION ONCE
Refills: 0 | Status: DISCONTINUED | OUTPATIENT
Start: 2023-04-07 | End: 2023-04-07

## 2023-04-07 RX ORDER — CIPROFLOXACIN LACTATE 400MG/40ML
500 VIAL (ML) INTRAVENOUS EVERY 12 HOURS
Refills: 0 | Status: DISCONTINUED | OUTPATIENT
Start: 2023-04-07 | End: 2023-04-11

## 2023-04-07 RX ADMIN — Medication 975 MILLIGRAM(S): at 21:39

## 2023-04-07 RX ADMIN — Medication 500 MILLIGRAM(S): at 21:23

## 2023-04-07 RX ADMIN — Medication 15 MILLIGRAM(S): at 15:00

## 2023-04-07 RX ADMIN — SODIUM CHLORIDE 1000 MILLILITER(S): 9 INJECTION INTRAMUSCULAR; INTRAVENOUS; SUBCUTANEOUS at 15:21

## 2023-04-07 RX ADMIN — Medication 15 MILLIGRAM(S): at 15:21

## 2023-04-07 RX ADMIN — SODIUM CHLORIDE 1000 MILLILITER(S): 9 INJECTION INTRAMUSCULAR; INTRAVENOUS; SUBCUTANEOUS at 14:38

## 2023-04-07 NOTE — ED PROVIDER NOTE - ATTENDING CONTRIBUTION TO CARE
I (Julia) agree with above, I performed a history and physical. Counseled daniel medical staff, physician assistant, and/or medical student on medical decision making as documented. Medical decisions and treatment interventions were made in real time during the patient encounter. Additionally and/or with the following exceptions: 47 -year-old past medical history hypertension and recurrent ear infections who is presenting to the emergency department with ear pain.  Was seen at [facility] CT imaging was performed which showed question of soft tissue thickening of the EAC.  They were transferred for ENT evaluation.  ENT evaluated the patient and they agree with our assessment that there is minimal inflammation, working diagnosis perichondritis.  Outside ED records were reviewed CBC was within normal limits no leukocytosis CMP within normal limits, patient stable for discharge.  First dose of ciprofloxacin given in the emergency department 5-day course sent to her pharmacy.  Follow-up with ENT return precautions reviewed.

## 2023-04-07 NOTE — CONSULT NOTE ADULT - SUBJECTIVE AND OBJECTIVE BOX
HPI:       47 -year-old past medical history hypertension and recurrent ear infections who is presenting to the emergency department with ear pain.  Was seen at [facility] CT imaging was performed which showed question of soft tissue thickening of the EAC.  They were transferred for ENT evaluation.  Patient states she has mild right ear pain that has improved after being given toradol. She denies hearing loss or dizziness. No tinnitus. No fever or chills, no discharge from the ear. She describes pain on the outside of the ear.     PE:  General: NAD, A+Ox3  No respiratory distress, stridor, or stertor  Voice quality: normal  Face:  Symmetric without masses or lesions  OU: PERRL, EOMI  AD: Pinna wnl, EAC with cerumen, unable to visualize TM, canal otherwise normal, no mastoid tenderness. External pinna tender to palpation  AS: Pinna wnl, EAC clear, TM intact, no effusion  Nose: nasal cavity clear bilaterall  OC/OP: tongue normal, floor of mouth wnl, no masses or lesions, OP clear  Neck: soft/flat, no LAD  CNII-XII intact        IMAGING:    RESULTS:

## 2023-04-07 NOTE — ED ADULT TRIAGE NOTE - CHIEF COMPLAINT QUOTE
Pt c/o ear pain, swelling, and sore throat x 5 days. Transfer from [place]. 20 G to  AC noted. Received 15 mL of Toradol and 1 L of NS at 3 pm. PMH of HTN.

## 2023-04-07 NOTE — ED PROVIDER NOTE - PROGRESS NOTE DETAILS
Attempted to remove debris at bedside with debrox, unable to remove much debris and visualize the TM. CT demonstrates soft tissue swelling and lytic changes, recommend ENT eval. ENT consulted at [facility] recommend transfer.

## 2023-04-07 NOTE — ED PROVIDER NOTE - CLINICAL SUMMARY MEDICAL DECISION MAKING FREE TEXT BOX
47 -year-old past medical history hypertension and recurrent ear infections who is presenting to the emergency department with ear pain.  Was seen at [facility] CT imaging was performed which showed question of soft tissue thickening of the EAC.  They were transferred for ENT evaluation.  ENT evaluated the patient and they agree with our assessment that there is minimal inflammation, working diagnosis perichondritis.  Outside ED records were reviewed CBC was within normal limits no leukocytosis CMP within normal limits, patient stable for discharge.  First dose of ciprofloxacin given in the emergency department 5-day course sent to her pharmacy.  Follow-up with ENT return precautions reviewed.    (Brant Dumont MD; attending emergency medicine and medical toxicology)

## 2023-04-07 NOTE — ED PROVIDER NOTE - OBJECTIVE STATEMENT
3/3/20 Patient: Christian Hernandez YOB: 1981 Date of Visit: 3/3/2020 Jhonny Frias NP 
[address] [place] [address] [id number] VIA In Basket Dear Jhonny Frias NP, Thank you for referring Ms. Christian Hernandez to [address] [place] [address] for evaluation. My notes for this consultation are attached. If you have questions, please do not hesitate to call me. I look forward to following your patient along with you. Sincerely, Yvonne Markham, DO 
 

46 y/o F w/ PMH HTN presenting to the ED c/o R ear pain. Patient states her symptoms have been ongoing x 1 week and now she is feeling pain in her throat and alongside her posterior ear. Reports she has been had similar symptoms in the past that required ENT evaluation. She denies fever, chills, N/V, or other acute symptoms. Reports occasional drainage from the ear. No difficulty swallowing. Per chart review, patient required extensive debridement and wick placement by ENT in 2019.

## 2023-04-07 NOTE — ED ADULT NURSE NOTE - OBJECTIVE STATEMENT
pt presents to ed c/o R ear pain x 3 days and sore throat . pt denies fever or chills no cough no difficulty breathing or difficulty swallowing hz htn anemia

## 2023-04-07 NOTE — ED PROVIDER NOTE - PATIENT PORTAL LINK FT
You can access the FollowMyHealth Patient Portal offered by [facility] by registering at the following website: [email or web address]. By joining [facility]’s FollowMyHealth portal, you will also be able to view your health information using other applications (apps) compatible with our system.

## 2023-04-07 NOTE — ED ADULT TRIAGE NOTE - HEIGHT IN FEET
Left message with call back information for patient to schedule follow up visit with Dr. Fatmata Dai. 5

## 2023-04-07 NOTE — ED ADULT NURSE REASSESSMENT NOTE - NS ED NURSE REASSESS COMMENT FT1
pt resting on stretcher , no complaints at this time. pt awaiting ambulance for transfer to [facility] for ENT. Will continue to monitor .

## 2023-04-07 NOTE — ED PROVIDER NOTE - NSFOLLOWUPINSTRUCTIONS_ED_ALL_ED_FT
Follow up with an Ear Nose Throat doctor within 1-3 days for re-evaluation.   Follow up with your primary care doctor within 1-3 days.    We are sending an oral antibiotic to your pharmacy to help with your ear pain.    Please take tylenol and advil over the counter to help with pain as well.   Avoid inserting anything into the ears, including cotton swabs, as these can cause trauma to the ear canals and membranes.     Come back immediately if you develop severe pain, are draining pus from the ears, fevers, or for any other worsening or concerning symptoms.         Ear Infection    WHAT YOU NEED TO KNOW:    What do I need to know about an ear infection? An ear infection is also called otitis media. Blocked or swollen eustachian tubes can cause an infection. Eustachian tubes connect the middle ear to the back of the nose and throat. They drain fluid from the middle ear. You may have a buildup of fluid in your ear. Germs build up in the fluid and infection develops.    Ear Anatomy     What are the signs and symptoms of an ear infection?   •Ear pain  •Fever or a headache  •Trouble hearing  •Ringing or buzzing in your ear  •Plugged ear or an ear that feels full  •Dizziness  •Nausea or vomiting    How is an ear infection diagnosed? Your healthcare provider will examine your ears, head, neck, and mouth. He or she will also ask you to describe your symptoms. You may also need the following:  •Audiometry is a test used to check for hearing loss. Sounds are played at different volumes to check how much you can hear.  •Tympanometry is a test used to check pressure changes in your inner ear.      How is an ear infection treated?   •Acetaminophen decreases pain and fever. It is available without a doctor's order. Ask how much to take and how often to take it. Follow directions. Read the labels of all other medicines you are using to see if they also contain acetaminophen, or ask your doctor or pharmacist. Acetaminophen can cause liver damage if not taken correctly. Do not use more than 4 grams (4,000 milligrams) total of acetaminophen in one day.   •NSAIDs, such as ibuprofen, help decrease swelling, pain, and fever. This medicine is available with or without a doctor's order. NSAIDs can cause stomach bleeding or kidney problems in certain people. If you take blood thinner medicine, always ask your healthcare provider if NSAIDs are safe for you. Always read the medicine label and follow directions.  •Ear drops may contain medicine to decrease pain and inflammation.  •Antibiotics help treat a bacterial infection.      How can I manage my symptoms?   •Apply heat on your ear for 15 to 20 minutes, 3 to 4 times a day or as directed. You can apply heat with an electric heating pad, hot water bottle, or warm compress. Always put a cloth between your skin and the heat pack to prevent burns. Heat helps decrease pain.      •Apply ice on your ear for 15 to 20 minutes, 3 to 4 times a day for 2 days or as directed. Use an ice pack, or put crushed ice in a plastic bag. Cover it with a towel before you apply it to your ear. Ice decreases swelling and pain.      How can I help prevent an ear infection?   •Wash your hands often to help prevent the spread of germs. Ask everyone in your house to wash their hands with soap and water. Ask them to wash after they use the bathroom or change a diaper. Remind them to wash before they prepare or eat food.  Handwashing  •Stay away from people who are ill. Some germs spread easily and quickly through contact.      When should I seek immediate care?   •You have clear fluid coming from your ear.  •You have a stiff neck, headache, and a fever.      When should I call my doctor?   •You see blood or pus draining from your ear.  •Your ear pain gets worse or does not go away, even after treatment.  •The outside of your ear is red or swollen  •You are vomiting or have diarhea.  •You have questions or concerns about your condition or care.

## 2023-04-07 NOTE — ED PROVIDER NOTE - PHYSICAL EXAMINATION
GENERAL: Awake, alert, NAD  HEENT: NC/AT, moist mucous membranes, R ear canal with significant debris, there is tenderness to palpation of the R mastoid process and mild swelling, no severe erythema  LUNGS: CTAB, no wheezes or crackles   CARDIAC: RRR, no m/r/g  EXT: No edema, no calf tenderness, no deformities.  NEURO: A&Ox3. Moving all extremities.  SKIN: Warm and dry. No rash.  PSYCH: Normal affect.

## 2023-04-07 NOTE — CONSULT NOTE ADULT - ASSESSMENT
48 yo female seen in ED for evaluation of ear pain. On exam, mild perichondritis suspected due to tender pinna. No evidence of otitis externa or media. No evidence of mastoid involvement     - oral ciprofloxacin   - Follow up in ENT clinic

## 2023-04-07 NOTE — ED PROVIDER NOTE - CLINICAL SUMMARY MEDICAL DECISION MAKING FREE TEXT BOX
48 y/o M presenting to the ED c/o R ear pain.  Vitals stable.  R ear canal with debris and mild swelling. Plan to eval with CT max/face to r/o osteo vs necrotizing otitis externa.  Reassess following labs/imaging.

## 2023-04-15 ENCOUNTER — INPATIENT (INPATIENT)
Facility: HOSPITAL | Age: 48
LOS: 9 days | Discharge: HOME HEALTH | End: 2023-04-25
Attending: INTERNAL MEDICINE | Admitting: INTERNAL MEDICINE
Payer: COMMERCIAL

## 2023-04-15 VITALS
HEIGHT: 62 IN | SYSTOLIC BLOOD PRESSURE: 140 MMHG | OXYGEN SATURATION: 98 % | RESPIRATION RATE: 18 BRPM | DIASTOLIC BLOOD PRESSURE: 93 MMHG | TEMPERATURE: 99 F | HEART RATE: 88 BPM

## 2023-04-15 LAB — BLOOD GAS VENOUS COMPREHENSIVE RESULT: SIGNIFICANT CHANGE UP

## 2023-04-15 PROCEDURE — 99285 EMERGENCY DEPT VISIT HI MDM: CPT

## 2023-04-15 RX ORDER — MORPHINE SULFATE 50 MG/1
4 CAPSULE, EXTENDED RELEASE ORAL ONCE
Refills: 0 | Status: DISCONTINUED | OUTPATIENT
Start: 2023-04-15 | End: 2023-04-15

## 2023-04-15 RX ORDER — SODIUM CHLORIDE 9 MG/ML
1000 INJECTION, SOLUTION INTRAVENOUS ONCE
Refills: 0 | Status: COMPLETED | OUTPATIENT
Start: 2023-04-15 | End: 2023-04-15

## 2023-04-15 RX ORDER — ACETAMINOPHEN 500 MG
1000 TABLET ORAL ONCE
Refills: 0 | Status: COMPLETED | OUTPATIENT
Start: 2023-04-15 | End: 2023-04-15

## 2023-04-15 RX ORDER — ONDANSETRON 8 MG/1
4 TABLET, FILM COATED ORAL ONCE
Refills: 0 | Status: COMPLETED | OUTPATIENT
Start: 2023-04-15 | End: 2023-04-15

## 2023-04-15 RX ADMIN — ONDANSETRON 4 MILLIGRAM(S): 8 TABLET, FILM COATED ORAL at 23:31

## 2023-04-15 RX ADMIN — SODIUM CHLORIDE 1000 MILLILITER(S): 9 INJECTION, SOLUTION INTRAVENOUS at 23:31

## 2023-04-15 RX ADMIN — Medication 400 MILLIGRAM(S): at 23:31

## 2023-04-15 RX ADMIN — MORPHINE SULFATE 4 MILLIGRAM(S): 50 CAPSULE, EXTENDED RELEASE ORAL at 23:32

## 2023-04-15 NOTE — ED PROVIDER NOTE - CARE PLAN
Principal Discharge DX:	Malignant otitis externa  Secondary Diagnosis:	Mastoiditis  Secondary Diagnosis:	Malignant otitis externa   1

## 2023-04-15 NOTE — ED PROVIDER NOTE - PROGRESS NOTE DETAILS
Óscar Estes DO: Received patient at signout pending CT and ENT evaluation.  ENT placed with concern for possible early malignant otitis externa recommending continuing IV Cipro as well as Ciprodex drops twice a day as well as observation for reassessment.  At time of evaluation pain improved.  Patient agrees with observation.  CT still pending at this time.

## 2023-04-15 NOTE — ED PROVIDER NOTE - OBJECTIVE STATEMENT
47F p/w 1 week of R neck pain, was at [facility], and CT was done.  Now c/o sore throat and swelling of anterior neck.  Pt reports pain is worse.  Finished the course of cipro.  No fever.  No vomiting.  Unable to eat, was able to get 2x yogurt.  Tried tylenol w/o improvement.  Came in due to worsening pain.  Pt was also given carbamide drops for ear wax.  Pt mod distress R ear pain.  R ear and periauricular area ttp and mild swelling, no deformity.  R ear canal thickened.  Ear wax and pus in ear canal, TM obscured.  Given degree of pain and concern for necrotizing otitis externa 1 week ago will rpt CT and get ENT consult.  Rx pain meds, check labs.  Afebrile here.  Pt says she's been having a similar problem since 2010, hasn't seen ENT since 2019.    VS:  unremarkable    GEN - mod distress R ear pain;   A+O x3   HEAD - NC/AT     ENT - PEERL, EOMI, mucous membranes    moist , no discharge    except  R ear and periauricular area ttp and mild swelling, no deformity.  R ear canal thickened.  Ear wax and pus in ear canal, TM obscured.  Voice normal, airway patent, managing secretions, no trismus.  NECK: Neck supple, non-tender without lymphadenopathy, no masses, no JVD  PULM - CTA b/l,  symmetric breath sounds  COR -  normal heart sounds    ABD - , ND, NT, soft,  BACK - no CVA tenderness, nontender spine     EXTREMS - no edema, no deformity, warm and well perfused    SKIN - no rash    or bruising      NEUROLOGIC - alert, face symmetric, speech fluent, sensation nl, motor no focal deficit.

## 2023-04-15 NOTE — ED PROVIDER NOTE - CLINICAL SUMMARY MEDICAL DECISION MAKING FREE TEXT BOX
47F p/w 1 week of R neck pain, was at [facility], and CT was done.  Now c/o sore throat and swelling of anterior neck.  Pt reports pain is worse.  Finished the course of cipro.  No fever.  No vomiting.  Unable to eat, was able to get 2x yogurt.  Tried tylenol w/o improvement.  Came in due to worsening pain.  Pt was also given carbamide drops for ear wax.  Pt mod distress R ear pain.  R ear and periauricular area ttp and mild swelling, no deformity.  R ear canal thickened.  Ear wax and pus in ear canal, TM obscured.  Given degree of pain and concern for necrotizing otitis externa 1 week ago will rpt CT and get ENT consult.  Rx pain meds, check labs.  Afebrile here.  Pt says she's been having a similar problem since 2010, hasn't seen ENT since 2019.

## 2023-04-15 NOTE — ED PROVIDER NOTE - PHYSICAL EXAMINATION
VS:  unremarkable    GEN - mod distress R ear pain;   A+O x3   HEAD - NC/AT     ENT - PEERL, EOMI, mucous membranes    moist , no discharge    except  R ear and periauricular area ttp and mild swelling, no deformity.  R ear canal thickened.  Ear wax and pus in ear canal, TM obscured.  Voice normal, airway patent, managing secretions, no trismus.  NECK: Neck supple, non-tender without lymphadenopathy, no masses, no JVD  PULM - CTA b/l,  symmetric breath sounds  COR -  normal heart sounds    ABD - , ND, NT, soft,  BACK - no CVA tenderness, nontender spine     EXTREMS - no edema, no deformity, warm and well perfused    SKIN - no rash    or bruising      NEUROLOGIC - alert, face symmetric, speech fluent, sensation nl, motor no focal deficit.

## 2023-04-15 NOTE — ED ADULT NURSE NOTE - OBJECTIVE STATEMENT
Patient received in ED intake spot 10A. A&Ox4 and amb. C/o worsening right ear pain with radiation around general face and sore throat x 1 week. Pt states was here last week for same reason, however symptoms have not been improving. Appears uncomfortable. Reports 10/10 pain level. Airway patent, speaking in full and complete sentences. Denies CP, SOB, vomiting, headache, lightheadedness, dizziness, fever or chills. Appears sitting up in stretcher, HOB elevated. IV 20g placed to right AC, labs drawn and sent as ordered. Medicated as ordered. Bed in lowest position, call bell in reach, wheels locked, safety maintained. Awaiting CT.

## 2023-04-15 NOTE — ED ADULT TRIAGE NOTE - CHIEF COMPLAINT QUOTE
Pt st" I was here week ago for rt  ear pain and sore throat , my teeth and head hurts....I was given gtts and antibiotics....I finished the antibiotics today...but nothing is better . I was told to see ENT but I don't have appointment untill the 24th...I cant wait." pt appears very uncomfortable.

## 2023-04-16 DIAGNOSIS — H60.90 UNSPECIFIED OTITIS EXTERNA, UNSPECIFIED EAR: ICD-10-CM

## 2023-04-16 DIAGNOSIS — R87.610 ATYPICAL SQUAMOUS CELLS OF UNDETERMINED SIGNIFICANCE ON CYTOLOGIC SMEAR OF CERVIX (ASC-US): ICD-10-CM

## 2023-04-16 DIAGNOSIS — E83.52 HYPERCALCEMIA: ICD-10-CM

## 2023-04-16 DIAGNOSIS — Z29.9 ENCOUNTER FOR PROPHYLACTIC MEASURES, UNSPECIFIED: ICD-10-CM

## 2023-04-16 DIAGNOSIS — H70.90 UNSPECIFIED MASTOIDITIS, UNSPECIFIED EAR: ICD-10-CM

## 2023-04-16 DIAGNOSIS — I10 ESSENTIAL (PRIMARY) HYPERTENSION: ICD-10-CM

## 2023-04-16 LAB
ALBUMIN SERPL ELPH-MCNC: 4.4 G/DL — SIGNIFICANT CHANGE UP (ref 3.3–5)
ALP SERPL-CCNC: 76 U/L — SIGNIFICANT CHANGE UP (ref 40–120)
ALT FLD-CCNC: 17 U/L — SIGNIFICANT CHANGE UP (ref 4–33)
ANION GAP SERPL CALC-SCNC: 13 MMOL/L — SIGNIFICANT CHANGE UP (ref 7–14)
AST SERPL-CCNC: 16 U/L — SIGNIFICANT CHANGE UP (ref 4–32)
BASOPHILS # BLD AUTO: 0.04 K/UL — SIGNIFICANT CHANGE UP (ref 0–0.2)
BASOPHILS NFR BLD AUTO: 0.3 % — SIGNIFICANT CHANGE UP (ref 0–2)
BILIRUB SERPL-MCNC: 0.6 MG/DL — SIGNIFICANT CHANGE UP (ref 0.2–1.2)
BUN SERPL-MCNC: 6 MG/DL — LOW (ref 7–23)
CALCIUM SERPL-MCNC: 11.2 MG/DL — HIGH (ref 8.4–10.5)
CHLORIDE SERPL-SCNC: 100 MMOL/L — SIGNIFICANT CHANGE UP (ref 98–107)
CO2 SERPL-SCNC: 24 MMOL/L — SIGNIFICANT CHANGE UP (ref 22–31)
CREAT SERPL-MCNC: 0.62 MG/DL — SIGNIFICANT CHANGE UP (ref 0.5–1.3)
EGFR: 110 ML/MIN/1.73M2 — SIGNIFICANT CHANGE UP
EOSINOPHIL # BLD AUTO: 0.17 K/UL — SIGNIFICANT CHANGE UP (ref 0–0.5)
EOSINOPHIL NFR BLD AUTO: 1.5 % — SIGNIFICANT CHANGE UP (ref 0–6)
FLUAV AG NPH QL: SIGNIFICANT CHANGE UP
FLUBV AG NPH QL: SIGNIFICANT CHANGE UP
GLUCOSE SERPL-MCNC: 99 MG/DL — SIGNIFICANT CHANGE UP (ref 70–99)
HCG SERPL-ACNC: <5 MIU/ML — SIGNIFICANT CHANGE UP
HCT VFR BLD CALC: 42 % — SIGNIFICANT CHANGE UP (ref 34.5–45)
HGB BLD-MCNC: 12.8 G/DL — SIGNIFICANT CHANGE UP (ref 11.5–15.5)
IANC: 7.16 K/UL — SIGNIFICANT CHANGE UP (ref 1.8–7.4)
IMM GRANULOCYTES NFR BLD AUTO: 0.3 % — SIGNIFICANT CHANGE UP (ref 0–0.9)
LYMPHOCYTES # BLD AUTO: 28.7 % — SIGNIFICANT CHANGE UP (ref 13–44)
LYMPHOCYTES # BLD AUTO: 3.34 K/UL — HIGH (ref 1–3.3)
MCHC RBC-ENTMCNC: 25 PG — LOW (ref 27–34)
MCHC RBC-ENTMCNC: 30.5 GM/DL — LOW (ref 32–36)
MCV RBC AUTO: 81.9 FL — SIGNIFICANT CHANGE UP (ref 80–100)
MONOCYTES # BLD AUTO: 0.89 K/UL — SIGNIFICANT CHANGE UP (ref 0–0.9)
MONOCYTES NFR BLD AUTO: 7.6 % — SIGNIFICANT CHANGE UP (ref 2–14)
NEUTROPHILS # BLD AUTO: 7.16 K/UL — SIGNIFICANT CHANGE UP (ref 1.8–7.4)
NEUTROPHILS NFR BLD AUTO: 61.6 % — SIGNIFICANT CHANGE UP (ref 43–77)
NRBC # BLD: 0 /100 WBCS — SIGNIFICANT CHANGE UP (ref 0–0)
NRBC # FLD: 0 K/UL — SIGNIFICANT CHANGE UP (ref 0–0)
PLATELET # BLD AUTO: 266 K/UL — SIGNIFICANT CHANGE UP (ref 150–400)
POTASSIUM SERPL-MCNC: 4.2 MMOL/L — SIGNIFICANT CHANGE UP (ref 3.5–5.3)
POTASSIUM SERPL-SCNC: 4.2 MMOL/L — SIGNIFICANT CHANGE UP (ref 3.5–5.3)
PROT SERPL-MCNC: 7.8 G/DL — SIGNIFICANT CHANGE UP (ref 6–8.3)
RBC # BLD: 5.13 M/UL — SIGNIFICANT CHANGE UP (ref 3.8–5.2)
RBC # FLD: 22.8 % — HIGH (ref 10.3–14.5)
RSV RNA NPH QL NAA+NON-PROBE: SIGNIFICANT CHANGE UP
SARS-COV-2 RNA SPEC QL NAA+PROBE: SIGNIFICANT CHANGE UP
SODIUM SERPL-SCNC: 137 MMOL/L — SIGNIFICANT CHANGE UP (ref 135–145)
WBC # BLD: 11.64 K/UL — HIGH (ref 3.8–10.5)
WBC # FLD AUTO: 11.64 K/UL — HIGH (ref 3.8–10.5)

## 2023-04-16 PROCEDURE — 88305 TISSUE EXAM BY PATHOLOGIST: CPT | Mod: 26

## 2023-04-16 PROCEDURE — 99223 1ST HOSP IP/OBS HIGH 75: CPT

## 2023-04-16 PROCEDURE — 99223 1ST HOSP IP/OBS HIGH 75: CPT | Mod: GC

## 2023-04-16 PROCEDURE — 70487 CT MAXILLOFACIAL W/DYE: CPT | Mod: 26,MA

## 2023-04-16 RX ORDER — AMPICILLIN SODIUM AND SULBACTAM SODIUM 250; 125 MG/ML; MG/ML
3 INJECTION, POWDER, FOR SUSPENSION INTRAMUSCULAR; INTRAVENOUS ONCE
Refills: 0 | Status: COMPLETED | OUTPATIENT
Start: 2023-04-16 | End: 2023-04-16

## 2023-04-16 RX ORDER — ACETAMINOPHEN 500 MG
650 TABLET ORAL EVERY 6 HOURS
Refills: 0 | Status: DISCONTINUED | OUTPATIENT
Start: 2023-04-16 | End: 2023-04-25

## 2023-04-16 RX ORDER — LANOLIN ALCOHOL/MO/W.PET/CERES
3 CREAM (GRAM) TOPICAL AT BEDTIME
Refills: 0 | Status: DISCONTINUED | OUTPATIENT
Start: 2023-04-16 | End: 2023-04-25

## 2023-04-16 RX ORDER — LISINOPRIL 2.5 MG/1
0 TABLET ORAL
Qty: 0 | Refills: 0 | DISCHARGE

## 2023-04-16 RX ORDER — METOPROLOL TARTRATE 50 MG
1 TABLET ORAL
Refills: 0 | DISCHARGE

## 2023-04-16 RX ORDER — CIPROFLOXACIN HCL 0.3 %
5 DROPS OPHTHALMIC (EYE)
Refills: 0 | Status: DISCONTINUED | OUTPATIENT
Start: 2023-04-16 | End: 2023-04-25

## 2023-04-16 RX ORDER — KETOROLAC TROMETHAMINE 30 MG/ML
30 SYRINGE (ML) INJECTION ONCE
Refills: 0 | Status: DISCONTINUED | OUTPATIENT
Start: 2023-04-16 | End: 2023-04-16

## 2023-04-16 RX ORDER — METOPROLOL TARTRATE 50 MG
0 TABLET ORAL
Qty: 0 | Refills: 0 | DISCHARGE

## 2023-04-16 RX ORDER — PIPERACILLIN AND TAZOBACTAM 4; .5 G/20ML; G/20ML
3.38 INJECTION, POWDER, LYOPHILIZED, FOR SOLUTION INTRAVENOUS ONCE
Refills: 0 | Status: COMPLETED | OUTPATIENT
Start: 2023-04-16 | End: 2023-04-16

## 2023-04-16 RX ORDER — CIPROFLOXACIN LACTATE 400MG/40ML
400 VIAL (ML) INTRAVENOUS ONCE
Refills: 0 | Status: COMPLETED | OUTPATIENT
Start: 2023-04-16 | End: 2023-04-16

## 2023-04-16 RX ORDER — LISINOPRIL 2.5 MG/1
1 TABLET ORAL
Refills: 0 | DISCHARGE

## 2023-04-16 RX ORDER — IBUPROFEN 200 MG
400 TABLET ORAL EVERY 6 HOURS
Refills: 0 | Status: DISCONTINUED | OUTPATIENT
Start: 2023-04-16 | End: 2023-04-17

## 2023-04-16 RX ORDER — CIPROFLOXACIN LACTATE 400MG/40ML
400 VIAL (ML) INTRAVENOUS EVERY 12 HOURS
Refills: 0 | Status: DISCONTINUED | OUTPATIENT
Start: 2023-04-16 | End: 2023-04-16

## 2023-04-16 RX ORDER — PIPERACILLIN AND TAZOBACTAM 4; .5 G/20ML; G/20ML
3.38 INJECTION, POWDER, LYOPHILIZED, FOR SOLUTION INTRAVENOUS ONCE
Refills: 0 | Status: DISCONTINUED | OUTPATIENT
Start: 2023-04-16 | End: 2023-04-16

## 2023-04-16 RX ORDER — OXYMETAZOLINE HYDROCHLORIDE 0.5 MG/ML
2 SPRAY NASAL ONCE
Refills: 0 | Status: DISCONTINUED | OUTPATIENT
Start: 2023-04-16 | End: 2023-04-16

## 2023-04-16 RX ORDER — KETOROLAC TROMETHAMINE 30 MG/ML
15 SYRINGE (ML) INJECTION ONCE
Refills: 0 | Status: DISCONTINUED | OUTPATIENT
Start: 2023-04-16 | End: 2023-04-16

## 2023-04-16 RX ORDER — PIPERACILLIN AND TAZOBACTAM 4; .5 G/20ML; G/20ML
3.38 INJECTION, POWDER, LYOPHILIZED, FOR SOLUTION INTRAVENOUS EVERY 8 HOURS
Refills: 0 | Status: COMPLETED | OUTPATIENT
Start: 2023-04-16 | End: 2023-04-23

## 2023-04-16 RX ADMIN — Medication 650 MILLIGRAM(S): at 16:32

## 2023-04-16 RX ADMIN — Medication 650 MILLIGRAM(S): at 15:32

## 2023-04-16 RX ADMIN — MORPHINE SULFATE 4 MILLIGRAM(S): 50 CAPSULE, EXTENDED RELEASE ORAL at 00:01

## 2023-04-16 RX ADMIN — Medication 5 DROP(S): at 03:52

## 2023-04-16 RX ADMIN — Medication 30 MILLIGRAM(S): at 20:56

## 2023-04-16 RX ADMIN — PIPERACILLIN AND TAZOBACTAM 25 GRAM(S): 4; .5 INJECTION, POWDER, LYOPHILIZED, FOR SOLUTION INTRAVENOUS at 18:05

## 2023-04-16 RX ADMIN — Medication 200 MILLIGRAM(S): at 03:51

## 2023-04-16 RX ADMIN — Medication 30 MILLIGRAM(S): at 21:10

## 2023-04-16 RX ADMIN — Medication 30 MILLIGRAM(S): at 07:55

## 2023-04-16 RX ADMIN — Medication 5 DROP(S): at 17:57

## 2023-04-16 RX ADMIN — PIPERACILLIN AND TAZOBACTAM 200 GRAM(S): 4; .5 INJECTION, POWDER, LYOPHILIZED, FOR SOLUTION INTRAVENOUS at 10:41

## 2023-04-16 RX ADMIN — Medication 15 MILLIGRAM(S): at 02:50

## 2023-04-16 RX ADMIN — Medication 400 MILLIGRAM(S): at 18:57

## 2023-04-16 RX ADMIN — Medication 15 MILLIGRAM(S): at 02:34

## 2023-04-16 RX ADMIN — Medication 1000 MILLIGRAM(S): at 00:01

## 2023-04-16 RX ADMIN — AMPICILLIN SODIUM AND SULBACTAM SODIUM 200 GRAM(S): 250; 125 INJECTION, POWDER, FOR SUSPENSION INTRAMUSCULAR; INTRAVENOUS at 02:34

## 2023-04-16 RX ADMIN — Medication 400 MILLIGRAM(S): at 19:49

## 2023-04-16 NOTE — CONSULT NOTE ADULT - SUBJECTIVE AND OBJECTIVE BOX
47F PMH HTN p/w right ear pain. Initially consulted on 4/7 and dc'd on PO cipro. Presents with worsening right ear pain. denies fevers. No otorrhea.     ICU Vital Signs Last 24 Hrs  T(C): 36.7 (16 Apr 2023 00:36), Max: 37.2 (15 Apr 2023 20:37)  T(F): 98 (16 Apr 2023 00:36), Max: 99 (15 Apr 2023 20:37)  HR: 70 (16 Apr 2023 00:36) (70 - 88)  BP: 142/84 (16 Apr 2023 00:36) (140/93 - 142/84)  BP(mean): --  ABP: --  ABP(mean): --  RR: 18 (16 Apr 2023 00:36) (18 - 18)  SpO2: 100% (16 Apr 2023 00:36) (98% - 100%)    O2 Parameters below as of 16 Apr 2023 00:36  Patient On (Oxygen Delivery Method): room air      PHYSICAL EXAM:    CONSTITUTIONAL: Well nourished, well developed, NON-DYSMORPHIC, and in no acute distress.    HEAD: normocephalic, atraumatic.  EARS: The right/left pinna was normal. Left EAC and TM normal. Right EAC with significant edema, unable to visualize TM, right mastoid tenderness  NOSE: Normal external nose.   ORAL CAVITY/OROPHARYNX: normal mucosa. No erythema, lesions or bleeding.  NECK: No cervical lymphadenopathy  RESPIRATORY: Respirations unlabored, no increased work of breathing with use of accessory muscles and retractions. No stridor.  CARDIAC: Warm extremities, no cyanosis.                          12.8   11.64 )-----------( 266      ( 15 Apr 2023 23:36 )             42.0   04-15    137  |  100  |  6<L>  ----------------------------<  99  4.2   |  24  |  0.62    Ca    11.2<H>      15 Apr 2023 23:36    TPro  7.8  /  Alb  4.4  /  TBili  0.6  /  DBili  x   /  AST  16  /  ALT  17  /  AlkPhos  76  04-15

## 2023-04-16 NOTE — H&P ADULT - PROBLEM SELECTOR PLAN 2
DVT PPx: Improve Score: Heparin 5000 subq Q8h/Lovenox 40mg QD   Diet: Regular/DASH/CC/Renal/NPO  Bowel Regimen: Last BM:    Miralax/Senna/Mag Citrate/ Lactulose/Enema  Code: Full/DNR/DNI   Dispo: PT/OT Pending Hospital Course  Pharmacy:  PCP:   Communication: - Pt presenting initially w/ hypercalcemia to 11.2, Albumin 4.4. Corrected 10.8. No hx of vitamin use or medications that may cause hypercalcemia.  - Pt asymptomatic, w/o polyuria or abdominal pain.  - CTM for now w/ serial labs, low threshold to send work-up - Pt presenting initially w/ hypercalcemia to 11.2, Albumin 4.4. Corrected 10.8. No hx of vitamin use or medications that may cause hypercalcemia.  - Pt asymptomatic, w/o polyuria or abdominal pain.  - CTM for now w/ serial labs, low threshold to send work-up  - f/u AM ionized calcium

## 2023-04-16 NOTE — H&P ADULT - NSHPREVIEWOFSYSTEMS_GEN_ALL_CORE
REVIEW OF SYSTEMS:    CONSTITUTIONAL: No weakness, fevers or chills  EYES: No vision changes  ENT: No vertigo or throat pain   NECK: No pain or stiffness  RESPIRATORY: No cough, wheezing, hemoptysis; No shortness of breath  CARDIOVASCULAR: No chest pain or palpitations  GASTROINTESTINAL: No abdominal or epigastric pain. No nausea, vomiting, or hematemesis; No diarrhea or constipation. No melena or hematochezia.  GENITOURINARY: No dysuria, frequency or hematuria  NEUROLOGICAL: No numbness or weakness  PSYCH: No anxiety, depression, or behavior changes  All other review of systems is negative unless indicated above. REVIEW OF SYSTEMS:    CONSTITUTIONAL: No weakness, fevers or chills  EYES: No vision changes  ENT: Sore throat, right-sided ear pain. Right ear without gross swelling or erythema, no drainage.   NECK: No pain or stiffness  RESPIRATORY: No cough, wheezing, hemoptysis; No shortness of breath  CARDIOVASCULAR: No chest pain or palpitations  GASTROINTESTINAL: No abdominal or epigastric pain. No nausea, vomiting, or hematemesis; No diarrhea or constipation. No melena or hematochezia.  GENITOURINARY: No dysuria, frequency or hematuria  NEUROLOGICAL: No numbness or weakness  PSYCH: No anxiety, depression, or behavior changes  All other review of systems is negative unless indicated above. REVIEW OF SYSTEMS:    CONSTITUTIONAL: No weakness, fevers or chills  EYES: No vision changes  ENT: Sore throat, right-sided ear pain. Right ear without gross swelling or erythema, no drainage. Right-sided hearing loss. No dizziness.  NECK: No pain or stiffness  RESPIRATORY: No cough, wheezing, hemoptysis; No shortness of breath  CARDIOVASCULAR: No chest pain or palpitations  GASTROINTESTINAL: No abdominal or epigastric pain. No nausea, vomiting, or hematemesis; No diarrhea or constipation. No melena or hematochezia.  GENITOURINARY: No dysuria, frequency or hematuria  NEUROLOGICAL: No numbness or weakness  PSYCH: No anxiety, depression, or behavior changes  All other review of systems is negative unless indicated above.

## 2023-04-16 NOTE — PROGRESS NOTE ADULT - SUBJECTIVE AND OBJECTIVE BOX
47F PMH HTN p/w right ear pain. Initially consulted on 4/7 and dc'd on PO cipro. Presents with worsening right ear pain. denies fevers. No otorrhea.     Interval:  4/16: still complaining of mild ear pain. facial nerve intact. suctioned out debris from ear today. biopsied tissue inside ear.     ICU Vital Signs Last 24 Hrs  T(C): 36.4 (16 Apr 2023 07:00), Max: 37.2 (15 Apr 2023 20:37)  T(F): 97.5 (16 Apr 2023 07:00), Max: 99 (15 Apr 2023 20:37)  HR: 73 (16 Apr 2023 07:00) (70 - 88)  BP: 132/81 (16 Apr 2023 07:00) (132/81 - 142/84)  BP(mean): --  ABP: --  ABP(mean): --  RR: 17 (16 Apr 2023 07:00) (17 - 18)  SpO2: 99% (16 Apr 2023 07:00) (98% - 100%)    O2 Parameters below as of 16 Apr 2023 07:00  Patient On (Oxygen Delivery Method): room air      O2 Parameters below as of 16 Apr 2023 00:36  Patient On (Oxygen Delivery Method): room air      PHYSICAL EXAM:    CONSTITUTIONAL: Well nourished, well developed, NON-DYSMORPHIC, and in no acute distress.    HEAD: normocephalic, atraumatic.  EARS: The right/left pinna was normal. Left EAC and TM normal. Right EAC with significant debris, polyps and canal edema - suctioned and biopsied soft tissue inside ear, unable to visualize TM, right mastoid tenderness  NOSE: Normal external nose.   ORAL CAVITY/OROPHARYNX: normal mucosa. No erythema, lesions or bleeding.  NECK: No cervical lymphadenopathy  RESPIRATORY: Respirations unlabored, no increased work of breathing with use of accessory muscles and retractions. No stridor.  CARDIAC: Warm extremities, no cyanosis.                          12.8   11.64 )-----------( 266      ( 15 Apr 2023 23:36 )             42.0   04-15    137  |  100  |  6<L>  ----------------------------<  99  4.2   |  24  |  0.62    Ca    11.2<H>      15 Apr 2023 23:36    TPro  7.8  /  Alb  4.4  /  TBili  0.6  /  DBili  x   /  AST  16  /  ALT  17  /  AlkPhos  76  04-15

## 2023-04-16 NOTE — ED CDU PROVIDER DISPOSITION NOTE - ATTENDING CONTRIBUTION TO CARE
I have personally performed a history and physical examination of the patient and discussed management with the GISELA as well as the patient.  I reviewed the GISELA's note and agree with the documented findings and plan of care.  I have authored and modified critical sections of the Provider Note, including but not limited to HPI, Physical Exam and MDM.    47-year-old female with past medical history of hypertension and recurrent right-sided ear infections presents to the ER complaining of 10 days worth of right-sided ear pain.  Was given 1 week of Cipro antibiotics with worsening symptoms and pain presented here seen by ENT who recommended Cipro IV and Ciprodex drops pending CT max face.  CT max face impression consistent with external otitis and mastoiditis.  Spoke with ENT with concern for malignant otitis and mastoiditis recommend Zosyn, continue Ciprodex drops and admission to medicine.  No plan for OR at this time.  Spoke with hospitalist Dr. Eldridge can admit to her service.  Patient aware of admission.

## 2023-04-16 NOTE — ED CDU PROVIDER INITIAL DAY NOTE - CLINICAL SUMMARY MEDICAL DECISION MAKING FREE TEXT BOX
48 yo F with PMHX of HTN and recurrent ear infections of right ear since 2010, here with complaint of right ear pain x 9 days, [facility]ipro x 1 week, returned with worsening pain, patient afebrile. seen by ENT in the ER, plan for CT prelim " 1.   Findings most suggestive of right otitis externa and otitis media; 2.   No evidence of abscess or mastoiditis as clinically questioned."  placed in CDU for continued cipro IV, ciprodex gtt and ENT following.

## 2023-04-16 NOTE — H&P ADULT - ASSESSMENT
Ms. Aguilar is a 47-yo with hx of ASCUS w/ HPV+ (never worked-up due to insurance issues), prior hospitalizations for Otitis Externa that failed oral abx requiring prolonged oral abx regimens w/ most recent Otitis Externa 3/2023 treated w/ Oral Ciprofloxacin regimen, who presents for continuing right-sided ear pain, found to have persistent Otitis Externa w/ ENT evaluating, being treated w/ IV Abx and pending full work-up w/ MRI.  Ms. Aguilar is a 47-yo with hx of ASCUS w/ HPV+ (never worked-up due to insurance issues), prior hospitalizations for Otitis Externa that failed oral abx requiring prolonged oral abx regimens w/ most recent Otitis Externa 3/2023 treated w/ Oral Ciprofloxacin regimen, who presents for continuing right-sided ear pain, found to have persistent Otitis Externa w/ ENT evaluating, being treated w/ IV and otic abx and pending full work-up w/ MRI.

## 2023-04-16 NOTE — H&P ADULT - PROBLEM SELECTOR PLAN 3
- BPs 130s/80s. On metoprolol and lisinopril at home  - Hold antihypertensives for now.  - CTM BPs - BPs 130s/80s. On metoprolol succinate 50 and lisinopril 10 mg at home  - Hold antihypertensives for now.  - CTM BPs

## 2023-04-16 NOTE — ED CDU PROVIDER DISPOSITION NOTE - CLINICAL COURSE
47-year-old female with past medical history of hypertension and recurrent right-sided ear infections presents to the ER complaining of 10 days worth of right-sided ear pain.  Was given 1 week of Cipro antibiotics with worsening symptoms and pain presented here seen by ENT who recommended Cipro IV and Ciprodex drops pending CT max face.  CT max face impression consistent with external otitis and mastoiditis.  Spoke with ENT with concern for malignant otitis and mastoiditis recommend Zosyn, continue Ciprodex drops and admission to medicine.  No plan for OR at this time.  Spoke with hospitalist Dr. Eldridge can admit to her service.  Patient aware of admission

## 2023-04-16 NOTE — H&P ADULT - NSHPPHYSICALEXAM_GEN_ALL_CORE
VITALS:   T(C): 37.3 (04-16-23 @ 12:53), Max: 37.3 (04-16-23 @ 12:53)  HR: 74 (04-16-23 @ 12:53) (70 - 88)  BP: 129/91 (04-16-23 @ 12:53) (129/91 - 142/84)  RR: 18 (04-16-23 @ 12:53) (17 - 18)  SpO2: 100% (04-16-23 @ 12:53) (98% - 100%)    GENERAL: NAD, lying in bed comfortably  HEAD:  Atraumatic, Normocephalic  EYES: EOMI, PERRLA, conjunctiva and sclera clear  ENT: Moist mucous membranes  NECK: Supple, No JVD  CHEST/LUNG: CTABL; No rales, rhonchi, wheezing, or rubs. Unlabored respirations  HEART: RRR. No M/R/G  ABDOMEN: Soft, nontender, non-distended, normoactive BS. No hepatomegaly  EXTREMITIES:  2+ Peripheral Pulses, brisk capillary refill. No clubbing, cyanosis, or edema  NERVOUS SYSTEM:  Alert & Oriented X3, speech clear. No deficits, CN II-XII intact. Normal sensation   MSK: FROM all 4 extremities, full and equal strength  PSYCH: Normal affect, normal speech, normal behavior  SKIN: No rashes or lesions VITALS:   T(C): 37.3 (04-16-23 @ 12:53), Max: 37.3 (04-16-23 @ 12:53)  HR: 74 (04-16-23 @ 12:53) (70 - 88)  BP: 129/91 (04-16-23 @ 12:53) (129/91 - 142/84)  RR: 18 (04-16-23 @ 12:53) (17 - 18)  SpO2: 100% (04-16-23 @ 12:53) (98% - 100%)    GENERAL: NAD, patient wincing in pain.  HEAD:  Atraumatic, Normocephalic  EYES: EOMI, PERRLA, conjunctiva and sclera clear  ENT: No gross drainage or deformity of the right ear. No erythema or gross swelling. Tender to palpation.   NECK: Supple, No JVD  CHEST/LUNG: CTABL; No rales, rhonchi, wheezing, or rubs. Unlabored respirations  HEART: RRR. No M/R/G  ABDOMEN: Soft, nontender, non-distended, normoactive BS. No hepatomegaly  EXTREMITIES:  2+ Peripheral Pulses, brisk capillary refill. No clubbing, cyanosis, or edema  NERVOUS SYSTEM:  Alert & Oriented X3, speech clear. No deficits, CN II-XII intact. Normal sensation   MSK: FROM all 4 extremities, full and equal strength  PSYCH: Normal affect, normal speech, normal behavior  SKIN: No rashes or lesions VITALS:   T(C): 37.3 (04-16-23 @ 12:53), Max: 37.3 (04-16-23 @ 12:53)  HR: 74 (04-16-23 @ 12:53) (70 - 88)  BP: 129/91 (04-16-23 @ 12:53) (129/91 - 142/84)  RR: 18 (04-16-23 @ 12:53) (17 - 18)  SpO2: 100% (04-16-23 @ 12:53) (98% - 100%)    GENERAL: NAD, patient wincing in pain.  HEAD:  Atraumatic, Normocephalic  EYES: EOMI, PERRLA, conjunctiva and sclera clear  ENT: No gross drainage or deformity of the right ear. No erythema or gross swelling. Tender to palpation in soft tissue anterior to auricle. No mastoid tenderness. Right-sided hearing loss. Could not trigger nystagmus w/ head turning.   NECK: Supple, No JVD  CHEST/LUNG: CTABL; No rales, rhonchi, wheezing, or rubs. Unlabored respirations  HEART: RRR. No M/R/G  ABDOMEN: Soft, nontender, non-distended, normoactive BS. No hepatomegaly  EXTREMITIES:  2+ Peripheral Pulses, brisk capillary refill. No clubbing, cyanosis, or edema  NERVOUS SYSTEM:  Alert & Oriented X3, speech clear. No deficits, CN II-XII intact. Normal sensation   MSK: FROM all 4 extremities, full and equal strength  PSYCH: Normal affect, normal speech, normal behavior  SKIN: No rashes or lesions

## 2023-04-16 NOTE — H&P ADULT - NSHPSOCIALHISTORY_GEN_ALL_CORE
Non-smoker, Non-drinker. Daughter lives nearby, but patient lives alone, functional w/ all ADLs. No assistive devices at home.

## 2023-04-16 NOTE — ED CDU PROVIDER INITIAL DAY NOTE - OBJECTIVE STATEMENT
CDU ESCOBAR Dan: 48 yo F with PMHX of HTN and recurrent ear infections of right ear since 2010, here with complaint of right ear pain x 9 days, [facility] x 1 week, returned with worsening pain, patient afebrile. seen by ENT in the ER, plan for CT prelim " 1.   Findings most suggestive of right otitis externa and otitis media; 2.   No evidence of abscess or mastoiditis as clinically questioned."  placed in CDU for continued cipro IV, ciprodex gtt and ENT following.

## 2023-04-16 NOTE — H&P ADULT - PROBLEM SELECTOR PLAN 4
DVT PPx: SCDs  Diet: Regular  Bowel Regimen: As needed  Code: Full  Dispo: PT/OT   Pharmacy:  PCP:   Communication: - Per HIE pt w/ previous HPV+/ASCUS never worked-up in detail due to insurance issues, saw Gyn-Onc as outpatient previously.  - Will need to refer to appropriate resources for follow-up.

## 2023-04-16 NOTE — H&P ADULT - PROBLEM SELECTOR PLAN 5
DVT PPx: SCDs  Diet: Regular, adjust as needed  Bowel Regimen: As needed  Code: Full  Dispo: Pt functional at b/l, consider PT/OT assessment after 24+ hrs of abx.    Pharmacy:  PCP:   Communication: DVT PPx: SCDs  Diet: Regular, adjust as needed - f/u AM a1c  Bowel Regimen: As needed  Code: Full  Dispo: Pt functional at b/l, consider PT/OT assessment after 24+ hrs of abx.    Pharmacy:  PCP:   Communication:

## 2023-04-16 NOTE — PATIENT PROFILE ADULT - FALL HARM RISK - UNIVERSAL INTERVENTIONS
Bed in lowest position, wheels locked, appropriate side rails in place/Call bell, personal items and telephone in reach/Instruct patient to call for assistance before getting out of bed or chair/Non-slip footwear when patient is out of bed/[place] to call system/Physically safe environment - no spills, clutter or unnecessary equipment/Purposeful Proactive Rounding/Room/bathroom lighting operational, light cord in reach

## 2023-04-16 NOTE — H&P ADULT - ATTENDING COMMENTS
47F with hx of ASCUS with HVP (never worked up due to insurance issues), recent otitis externa (s/p ciprofloxacin), who presents for persistent ear pain, c/f malignant otitis externa. CT maxillofacial with external otitis and mastoiditis. ENT eval appreciated - c/f malignant otitis externa, with lower suspicion for infected cholesteatoma and squamous cell carcinoma of the ear. MRI IAC to r/o ANASTACIO, f/u biopsy results to r/o SCC, broaden to zosyn pending ear cultures taking previously, ciprodex gtt. Rest of care as above. 47F with hx of ASCUS with HVP (never worked up due to insurance issues), HTN, recent otitis externa (seen at [facility] s/p ciprofloxacin), who presents for persistent ear pain, c/f malignant otitis externa. CT maxillofacial with external otitis and mastoiditis. ENT eval appreciated - c/f malignant otitis externa, with lower suspicion for infected cholesteatoma and squamous cell carcinoma of the ear. MRI IAC to r/o ANASTACIO, f/u biopsy results to r/o SCC, broaden to zosyn pending ear cultures taking previously, ciprodex gtt. Rest of care as above. 47F with hx of ASCUS with HVP (never worked up due to insurance issues), HTN, recent otitis externa (seen at [facility] s/p ciprofloxacin), who presents for persistent ear pain, c/f malignant otitis externa. CT maxillofacial with external otitis and mastoiditis. ENT eval appreciated - c/f malignant otitis externa, with lower suspicion for infected cholesteatoma and squamous cell carcinoma of the ear. MRI IAC to r/o ANASTACIO, f/u biopsy results to r/o SCC, broaden to zosyn pending ear cultures, ciprodex gtt. Rest of care as above.

## 2023-04-16 NOTE — PROGRESS NOTE ADULT - ASSESSMENT
47F PMH HTN p/w right ear pain, intially consulted 4/7 for R ear pain and dc on PO cipro. No presenting with worsening pain. R EAC edematous, unable to visualize TM. R ear wick placed.     Exam and story consistent with malignant otitis externa vs infected cholesteatoma (less likely) vs squamous cell carcinoma of the ear (also less likely)    - f/u biopsy results to rule out SCC  - please obtain MRI IAC with Gallium to establish diagnosis of ANASTACIO and monitor treatment  - continue ciprodex drops twice a day to right ear  - broaden to zosyn  - f/u ear cultures previously taken

## 2023-04-16 NOTE — H&P ADULT - HISTORY OF PRESENT ILLNESS
Ms. Aguilar is a 47-yo with hx of ASCUS w/ HPV+ (never worked-up due to insurance issues), recent Otitis Externa recently treated w/ Oral Ciprofloxacin regimen who presents for continuing ear pain. Pt was seen at [facility] for 1 week of right-sided neck pain, with CT completed showing findings suggesting otitis externa (possibly necrotizing) vs cholesteatoma/malignancy. Patient was given a 5-day course of 500 mg BID Ciprofloxacin which she completed, but pain worsened, which warranted her presentation to [facility].     ED course notable for vitals afebrile, HR and BPs wnl. Labs notable for white count to 11. Micro notable for negative ear bacterial culture. CT Maxillofacial demonstrated right external otitis and mastoiditis, No abscess, intracranial extension seen. ENT was consulted, recommending MRI IAC w/ Gallium to establish diagnosis of Malignant Otitis Externa, f/u Biopsy to ruleout SCC, treat w/ Zosyn and Ciprofloxacin drops to the right ear.        Ms. Aguilar is a 47-yo with hx of ASCUS w/ HPV+ (never worked-up due to insurance issues), recent Otitis Externa recently treated w/ Oral Ciprofloxacin regimen who presents for continuing ear pain. Pt was seen at [facility] for 1 week of right-sided neck pain, with CT completed showing findings suggesting otitis externa (possibly necrotizing) vs cholesteatoma/malignancy. Patient was given a 5-day course of 500 mg BID Ciprofloxacin which she completed, but pain worsened, which warranted her presentation to [facility]. Of note, has had prior admissions, most notably in 2019 for right otitis externa c/b mastoiditis in s/o cleaning ear with cotton swab, that had failed abx prior, debrided & treated w/ longer courses of Cipro.      ED course notable for vitals afebrile, HR and BPs wnl. Labs notable for white count to 11 and hypercalcemic 11.2 w/ album 4.4. Micro notable for negative ear bacterial culture. CT Maxillofacial demonstrated right external otitis and mastoiditis, No abscess, intracranial extension seen. ENT was consulted, recommending MRI IAC w/ Gallium to establish diagnosis of Malignant Otitis Externa, f/u Biopsy to ruleout SCC, treat w/ Zosyn and Ciprofloxacin drops to the right ear.     Patient seen and spoken to at bedside. Endorses continuing right-sided ear pain with sore throat, asking for appropriate pain regimen. Pain is the same as that of her prior admissions w/ no new symptoms. No subjective fever, vison changes, or focal weakness.  Denies any recent ear manipulation or water exposure. Regarding her hypercalcemia, she denies any polyuria or abdominal pain. No vitamins at home besides Iron, only takes BB and ACE at home for hypertension.     Ms. Aguilar is a 47-yo with hx of ASCUS w/ HPV+ (never worked-up due to insurance issues), recent Otitis Externa recently treated w/ Oral Ciprofloxacin regimen who presents for continuing ear pain. Pt was seen at [facility] for 1 week of right-sided neck pain, with CT completed showing findings suggesting otitis externa (possibly necrotizing) vs cholesteatoma/malignancy. Patient was given a 5-day course of 500 mg BID Ciprofloxacin which she completed, but pain worsened, which warranted her presentation to [facility]. Of note, has had prior admissions, most notably in 2019 for right otitis externa c/b mastoiditis in s/o cleaning ear with cotton swab, that had failed abx prior, debrided & treated w/ longer courses of Cipro.      ED course notable for vitals afebrile, HR and BPs wnl. Labs notable for white count to 11 and hypercalcemic 11.2 w/ album 4.4. Micro notable for negative ear bacterial culture. CT Maxillofacial demonstrated right external otitis and mastoiditis, No abscess, intracranial extension seen. ENT was consulted, recommending MRI IAC w/ Gallium to establish diagnosis of Malignant Otitis Externa, f/u Biopsy to ruleout SCC, treat w/ Zosyn and Ciprofloxacin drops to the right ear.     Patient seen and spoken to at bedside. Endorses continuing right-sided ear pain with sore throat and hearing loss, asking for appropriate pain regimen as tylenol has not worked well at home. Pain is the same as that of her prior admissions w/ no new symptoms. No subjective fever, vison changes, or focal weakness.  Denies any recent ear manipulation or water exposure, however has been cleaning her ears with cotton swabs. Regarding her hypercalcemia, she denies any polyuria or abdominal pain. No vitamins at home besides Iron, only takes BB and ACE at home for hypertension.     Ms. Aguilar is a 47-yo with hx of ASCUS w/ HPV+ (never worked-up due to insurance issues), recurrent hospitalizations for Otitis Externa 2/2 cotton swab ear cleaning that have failed oral abx, presents for continuing ear pain despite recent regimen of Ciprofloxacin for otitis externa. Pt was seen at [facility] for 1 week of right-sided neck pain, with CT suggesting otitis externa (possibly necrotizing) vs cholesteatoma/malignancy. Patient complated a5-day course of ciprofloxacin, but pain worsened. Last admission was in 2019 for right otitis externa c/b mastoiditis in s/o cleaning ear with cotton swab, that had failed abx prior, debrided & treated w/ a longer course of Cipro.      ED course notable for vitals afebrile, HR and BPs wnl. Labs notable for white count to 11 and hypercalcemic 11.2 w/ album 4.4. Micro notable for negative ear bacterial culture. CT Maxillofacial w/ right external otitis and mastoiditis, No abscess or intracranial extension seen. ENT was consulted, recommending MRI IAC w/ Gallium to establish diagnosis of Malignant Otitis Externa, f/u Biopsy to ruleout SCC, treat w/ Zosyn and Ciprodex drops to the right ear.     Patient seen and spoken to at bedside. Endorses continuing right-sided ear pain with sore throat and right-sided hearing loss, asking for pain regimen as tylenol has not worked well at home. Pain is the same as that of her prior admissions. No subjective fever, vison changes, dizziness, or focal weakness.  Denies any recent ear manipulation or water exposure, however has been cleaning her ears with cotton swabs. Regarding her hypercalcemia, she denies any polyuria or abdominal pain. No vitamins at home besides Iron, only takes BB and ACE at home for hypertension.

## 2023-04-16 NOTE — H&P ADULT - PROBLEM SELECTOR PLAN 1
- Pt w/ recurrent episodes of Otitis Externa since 2010. Episode in 2019 was presumed to be due to cotton-swab cleaning. This current episode is most likely Otitis Externa as opposed to malignancy or cholesteatoma, however will perform work-up to rule-out.   - It is unclear what aggravated patient's recurrent Otitis External w/ recurrent episodes over the course of 10 years. Patient could have underlying undiagnosed immunodeficiency such as HIV.   - Ear culture results negative for bacteria. Biopsy pending.  - ENT consulted, appreciate recommendations         - IV Zosyn (4/16-)         - Otal Ciprofloxacin BID in the right ear         - MRI IAC with Gallium         - f/u Biopsy  - Pain management with Tylenol 650 mg PRN, escalate as needed.  - HIV testing - Pt w/ recurrent episodes of Otitis Externa since 2010. Episode in 2019 was presumed to be due to cotton-swab cleaning. This current episode is most likely Otitis Externa as opposed to malignancy or cholesteatoma, however will perform work-up to rule-out.   - Patient's recurrent Otitis External w/ recurrent episodes over the course of 10 years most likely due to self-cleaning w/ cotton swabs.  - Counseled patient against using cotton swabs to clean ear.   - Ear culture results negative for bacteria. Biopsy pending.  - ENT consulted, appreciate recommendations         - IV Zosyn (4/16-)         - Otal Ciprofloxacin BID in the right ear         - MRI IAC with Gallium         - f/u Biopsy  - Pain management with Tylenol and Ibuprofen, escalate as needed - Pt w/ recurrent episodes of Otitis Externa since 2010. Episode in 2019 was presumed to be due to cotton-swab cleaning. This current episode is most likely Otitis Externa as opposed to malignancy or cholesteatoma, however will perform work-up to rule-out.   - Patient's recurrent Otitis External w/ recurrent episodes over the course of 10 years most likely due to self-cleaning w/ cotton swabs.  - Counseled patient against using cotton swabs to clean ear.   - Ear culture results negative for bacteria.   - ENT consulted, appreciate recommendations         - IV Zosyn (4/16-)         - Otal Ciprodex BID in the right ear         - MRI IAC with Gallium         - f/u Biopsy  - Pain management with Tylenol and Ibuprofen, escalate w/ Toradol/Oxycodone as needed

## 2023-04-16 NOTE — H&P ADULT - NSHPLABSRESULTS_GEN_ALL_CORE
12.8   11.64 )-----------( 266      ( 15 Apr 2023 23:36 )             42.0     04-15    137  |  100  |  6<L>  ----------------------------<  99  4.2   |  24  |  0.62    Ca    11.2<H>      15 Apr 2023 23:36    TPro  7.8  /  Alb  4.4  /  TBili  0.6  /  DBili  x   /  AST  16  /  ALT  17  /  AlkPhos  76  04-15          LIVER FUNCTIONS - ( 15 Apr 2023 23:36 )  Alb: 4.4 g/dL / Pro: 7.8 g/dL / ALK PHOS: 76 U/L / ALT: 17 U/L / AST: 16 U/L / GGT: x

## 2023-04-16 NOTE — ED CDU PROVIDER INITIAL DAY NOTE - ATTENDING APP SHARED VISIT CONTRIBUTION OF CARE
46 yo f past medical history HTN presents to ED with persistent right ear pain with concern for 46 yo f past medical history HTN presents to ED with persistent right ear pain with concern for early malignant OE/ poss OM. patient with wick placement in ED by ENT. patient with decreased hearing out of right ear but otherwise no focal neuro deficit on exam. pain controlled at time of my eval. Plan: IV and otic abx, ENT re-eval, symptom relief prn.

## 2023-04-16 NOTE — ED CDU PROVIDER INITIAL DAY NOTE - PHYSICAL EXAMINATION
Vital signs reviewed.   CONSTITUTIONAL: Well-appearing; well-nourished; in no apparent distress. Non-toxic appearing.   HEAD: Normocephalic, atraumatic.  EYES: PERRL, EOM intact, conjunctiva and sclera WNL.  ENT: normal nose; no rhinorrhea; normal pharynx with no tonsillar hypertrophy, no erythema, no exudate, no lymphadenopathy. right ear with wick in place.   NECK/LYMPH: Supple; non-tender; no cervical lymphadenopathy.  CARD: Normal S1, S2; no murmurs, rubs, or gallops noted.  RESP: Normal chest excursion with respiration; breath sounds clear and equal bilaterally; no wheezes, rhonchi, or rales.  ABD/GI: soft, non-distended; non-tender; no palpable organomegaly, no pulsatile mass.  EXT/MS: moves all extremities; distal pulses are normal, no pedal edema.  SKIN: Normal for age and race; warm; dry; good turgor; no apparent lesions or exudate noted.  NEURO: Awake, alert, oriented x 3, no gross deficits, CN II-XII grossly intact, no motor or sensory deficit noted.

## 2023-04-17 LAB
A1C WITH ESTIMATED AVERAGE GLUCOSE RESULT: 4.7 % — SIGNIFICANT CHANGE UP (ref 4–5.6)
ALBUMIN SERPL ELPH-MCNC: 3.8 G/DL — SIGNIFICANT CHANGE UP (ref 3.3–5)
ALP SERPL-CCNC: 66 U/L — SIGNIFICANT CHANGE UP (ref 40–120)
ALT FLD-CCNC: 12 U/L — SIGNIFICANT CHANGE UP (ref 4–33)
ANION GAP SERPL CALC-SCNC: 11 MMOL/L — SIGNIFICANT CHANGE UP (ref 7–14)
AST SERPL-CCNC: 16 U/L — SIGNIFICANT CHANGE UP (ref 4–32)
BASOPHILS # BLD AUTO: 0.03 K/UL — SIGNIFICANT CHANGE UP (ref 0–0.2)
BASOPHILS NFR BLD AUTO: 0.4 % — SIGNIFICANT CHANGE UP (ref 0–2)
BILIRUB SERPL-MCNC: 0.7 MG/DL — SIGNIFICANT CHANGE UP (ref 0.2–1.2)
BUN SERPL-MCNC: 8 MG/DL — SIGNIFICANT CHANGE UP (ref 7–23)
CA-I BLD-SCNC: 1.38 MMOL/L — HIGH (ref 1.15–1.29)
CALCIUM SERPL-MCNC: 10.5 MG/DL — SIGNIFICANT CHANGE UP (ref 8.4–10.5)
CHLORIDE SERPL-SCNC: 99 MMOL/L — SIGNIFICANT CHANGE UP (ref 98–107)
CO2 SERPL-SCNC: 24 MMOL/L — SIGNIFICANT CHANGE UP (ref 22–31)
CREAT SERPL-MCNC: 0.7 MG/DL — SIGNIFICANT CHANGE UP (ref 0.5–1.3)
EGFR: 107 ML/MIN/1.73M2 — SIGNIFICANT CHANGE UP
EOSINOPHIL # BLD AUTO: 0.18 K/UL — SIGNIFICANT CHANGE UP (ref 0–0.5)
EOSINOPHIL NFR BLD AUTO: 2.3 % — SIGNIFICANT CHANGE UP (ref 0–6)
ESTIMATED AVERAGE GLUCOSE: 88 — SIGNIFICANT CHANGE UP
GLUCOSE SERPL-MCNC: 80 MG/DL — SIGNIFICANT CHANGE UP (ref 70–99)
HCT VFR BLD CALC: 39.9 % — SIGNIFICANT CHANGE UP (ref 34.5–45)
HGB BLD-MCNC: 12.1 G/DL — SIGNIFICANT CHANGE UP (ref 11.5–15.5)
IANC: 4.76 K/UL — SIGNIFICANT CHANGE UP (ref 1.8–7.4)
IMM GRANULOCYTES NFR BLD AUTO: 0.3 % — SIGNIFICANT CHANGE UP (ref 0–0.9)
LYMPHOCYTES # BLD AUTO: 2.25 K/UL — SIGNIFICANT CHANGE UP (ref 1–3.3)
LYMPHOCYTES # BLD AUTO: 28.5 % — SIGNIFICANT CHANGE UP (ref 13–44)
MAGNESIUM SERPL-MCNC: 2 MG/DL — SIGNIFICANT CHANGE UP (ref 1.6–2.6)
MCHC RBC-ENTMCNC: 25.3 PG — LOW (ref 27–34)
MCHC RBC-ENTMCNC: 30.3 GM/DL — LOW (ref 32–36)
MCV RBC AUTO: 83.5 FL — SIGNIFICANT CHANGE UP (ref 80–100)
MONOCYTES # BLD AUTO: 0.66 K/UL — SIGNIFICANT CHANGE UP (ref 0–0.9)
MONOCYTES NFR BLD AUTO: 8.4 % — SIGNIFICANT CHANGE UP (ref 2–14)
NEUTROPHILS # BLD AUTO: 4.76 K/UL — SIGNIFICANT CHANGE UP (ref 1.8–7.4)
NEUTROPHILS NFR BLD AUTO: 60.1 % — SIGNIFICANT CHANGE UP (ref 43–77)
NRBC # BLD: 0 /100 WBCS — SIGNIFICANT CHANGE UP (ref 0–0)
NRBC # FLD: 0 K/UL — SIGNIFICANT CHANGE UP (ref 0–0)
PHOSPHATE SERPL-MCNC: 3.6 MG/DL — SIGNIFICANT CHANGE UP (ref 2.5–4.5)
PLATELET # BLD AUTO: 220 K/UL — SIGNIFICANT CHANGE UP (ref 150–400)
POTASSIUM SERPL-MCNC: 4.4 MMOL/L — SIGNIFICANT CHANGE UP (ref 3.5–5.3)
POTASSIUM SERPL-SCNC: 4.4 MMOL/L — SIGNIFICANT CHANGE UP (ref 3.5–5.3)
PROT SERPL-MCNC: 6.9 G/DL — SIGNIFICANT CHANGE UP (ref 6–8.3)
RBC # BLD: 4.78 M/UL — SIGNIFICANT CHANGE UP (ref 3.8–5.2)
RBC # FLD: 22.6 % — HIGH (ref 10.3–14.5)
SODIUM SERPL-SCNC: 134 MMOL/L — LOW (ref 135–145)
WBC # BLD: 7.9 K/UL — SIGNIFICANT CHANGE UP (ref 3.8–10.5)
WBC # FLD AUTO: 7.9 K/UL — SIGNIFICANT CHANGE UP (ref 3.8–10.5)

## 2023-04-17 PROCEDURE — 99233 SBSQ HOSP IP/OBS HIGH 50: CPT | Mod: GC

## 2023-04-17 RX ORDER — KETOROLAC TROMETHAMINE 30 MG/ML
15 SYRINGE (ML) INJECTION EVERY 6 HOURS
Refills: 0 | Status: DISCONTINUED | OUTPATIENT
Start: 2023-04-17 | End: 2023-04-22

## 2023-04-17 RX ORDER — GABAPENTIN 400 MG/1
100 CAPSULE ORAL DAILY
Refills: 0 | Status: DISCONTINUED | OUTPATIENT
Start: 2023-04-17 | End: 2023-04-25

## 2023-04-17 RX ORDER — KETOROLAC TROMETHAMINE 30 MG/ML
30 SYRINGE (ML) INJECTION ONCE
Refills: 0 | Status: DISCONTINUED | OUTPATIENT
Start: 2023-04-17 | End: 2023-04-17

## 2023-04-17 RX ADMIN — Medication 30 MILLIGRAM(S): at 06:48

## 2023-04-17 RX ADMIN — Medication 30 MILLIGRAM(S): at 02:36

## 2023-04-17 RX ADMIN — Medication 15 MILLIGRAM(S): at 12:10

## 2023-04-17 RX ADMIN — Medication 5 DROP(S): at 18:10

## 2023-04-17 RX ADMIN — PIPERACILLIN AND TAZOBACTAM 25 GRAM(S): 4; .5 INJECTION, POWDER, LYOPHILIZED, FOR SOLUTION INTRAVENOUS at 11:34

## 2023-04-17 RX ADMIN — Medication 15 MILLIGRAM(S): at 11:47

## 2023-04-17 RX ADMIN — Medication 30 MILLIGRAM(S): at 06:33

## 2023-04-17 RX ADMIN — GABAPENTIN 100 MILLIGRAM(S): 400 CAPSULE ORAL at 11:34

## 2023-04-17 RX ADMIN — Medication 30 MILLIGRAM(S): at 02:21

## 2023-04-17 RX ADMIN — Medication 15 MILLIGRAM(S): at 23:22

## 2023-04-17 RX ADMIN — Medication 400 MILLIGRAM(S): at 01:17

## 2023-04-17 RX ADMIN — PIPERACILLIN AND TAZOBACTAM 25 GRAM(S): 4; .5 INJECTION, POWDER, LYOPHILIZED, FOR SOLUTION INTRAVENOUS at 02:59

## 2023-04-17 RX ADMIN — Medication 3 MILLIGRAM(S): at 22:30

## 2023-04-17 RX ADMIN — Medication 650 MILLIGRAM(S): at 22:33

## 2023-04-17 RX ADMIN — PIPERACILLIN AND TAZOBACTAM 25 GRAM(S): 4; .5 INJECTION, POWDER, LYOPHILIZED, FOR SOLUTION INTRAVENOUS at 18:10

## 2023-04-17 RX ADMIN — Medication 15 MILLIGRAM(S): at 18:20

## 2023-04-17 RX ADMIN — Medication 650 MILLIGRAM(S): at 23:33

## 2023-04-17 RX ADMIN — Medication 400 MILLIGRAM(S): at 01:56

## 2023-04-17 RX ADMIN — Medication 15 MILLIGRAM(S): at 22:22

## 2023-04-17 RX ADMIN — Medication 5 DROP(S): at 06:19

## 2023-04-17 RX ADMIN — Medication 15 MILLIGRAM(S): at 16:27

## 2023-04-17 NOTE — PROGRESS NOTE ADULT - SUBJECTIVE AND OBJECTIVE BOX
***************************************************************  Bai (Ji-Cheng) [facility] [place] PGY1  Internal Medicine   ***************************************************************    DL BRAVO  47y  MRN: [id number]    Patient is a 47y old  Female who presents with a chief complaint of Otitis Externa (16 Apr 2023 13:43)      Subjective: no events ON. Denies fever, CP, SOB, abn pain, N/V, dysuria. Tolerating diet.      MEDICATIONS  (STANDING):  ciprofloxacin  0.3% Ophthalmic Solution for Otic Use 5 Drop(s) Right Ear two times a day  dexAMETHasone 0.1% Ophthalmic Solution for OTIC Use 5 Drop(s) Right Ear two times a day  piperacillin/tazobactam IVPB.. 3.375 Gram(s) IV Intermittent every 8 hours    MEDICATIONS  (PRN):  acetaminophen     Tablet .. 650 milliGRAM(s) Oral every 6 hours PRN Temp greater or equal to 38C (100.4F), Mild Pain (1 - 3)  aluminum hydroxide/magnesium hydroxide/simethicone Suspension 30 milliLiter(s) Oral every 4 hours PRN Dyspepsia  ibuprofen  Tablet. 400 milliGRAM(s) Oral every 6 hours PRN Moderate Pain (4 - 6), Severe Pain (7 - 10)  melatonin 3 milliGRAM(s) Oral at bedtime PRN Insomnia      Objective:    Vitals: Vital Signs Last 24 Hrs  T(C): 36.5 (04-17-23 @ 06:23), Max: 37.3 (04-16-23 @ 12:53)  T(F): 97.7 (04-17-23 @ 06:23), Max: 99.1 (04-16-23 @ 12:53)  HR: 71 (04-17-23 @ 06:23) (71 - 98)  BP: 135/87 (04-17-23 @ 06:23) (129/91 - 136/90)  BP(mean): --  RR: 17 (04-17-23 @ 06:23) (17 - 18)  SpO2: 100% (04-17-23 @ 06:23) (100% - 100%)            I&O's Summary    16 Apr 2023 07:01  -  17 Apr 2023 07:00  --------------------------------------------------------  IN: 100 mL / OUT: 300 mL / NET: -200 mL        PHYSICAL EXAM:  GENERAL: NAD  HEAD:  Atraumatic, Normocephalic  EYES: EOMI, conjunctiva and sclera clear  CHEST/LUNG: Clear to auscultation bilaterally; No rales, rhonchi, wheezing, or rubs  HEART: Regular rate and rhythm; No murmurs, rubs, or gallops  ABDOMEN: Soft, Nontender, Nondistended;   SKIN: No rashes or lesions  NERVOUS SYSTEM:  Alert & Oriented X3, no focal deficits    LABS:  04-15    137  |  100  |  6<L>  ----------------------------<  99  4.2   |  24  |  0.62    Ca    11.2<H>      15 Apr 2023 23:36    TPro  7.8  /  Alb  4.4  /  TBili  0.6  /  DBili  x   /  AST  16  /  ALT  17  /  AlkPhos  76  04-15                                              12.8   11.64 )-----------( 266      ( 15 Apr 2023 23:36 )             42.0     CAPILLARY BLOOD GLUCOSE          RADIOLOGY & ADDITIONAL TESTS:    Imaging Personally Reviewed:  [x ] YES  [ ] NO    Consultants involved in case:   Consultant(s) Notes Reviewed:  [ x] YES  [ ] NO:   Care Discussed with Consultants/Other Providers [x ] YES  [ ] NO         ***************************************************************  Bai (Ji-Cheng) [facility] PGY1  Internal Medicine   ***************************************************************    DL BRAVO  47y  MRN: [id number]    Patient is a 47y old  Female who presents with a chief complaint of Otitis Externa (16 Apr 2023 13:43)      Subjective: NAEO. Toradol helped overnight for ear pain. Still w/ right ear pain, right hearing loss, and sore throat.     MEDICATIONS  (STANDING):  ciprofloxacin  0.3% Ophthalmic Solution for Otic Use 5 Drop(s) Right Ear two times a day  dexAMETHasone 0.1% Ophthalmic Solution for OTIC Use 5 Drop(s) Right Ear two times a day  piperacillin/tazobactam IVPB.. 3.375 Gram(s) IV Intermittent every 8 hours    MEDICATIONS  (PRN):  acetaminophen     Tablet .. 650 milliGRAM(s) Oral every 6 hours PRN Temp greater or equal to 38C (100.4F), Mild Pain (1 - 3)  aluminum hydroxide/magnesium hydroxide/simethicone Suspension 30 milliLiter(s) Oral every 4 hours PRN Dyspepsia  ibuprofen  Tablet. 400 milliGRAM(s) Oral every 6 hours PRN Moderate Pain (4 - 6), Severe Pain (7 - 10)  melatonin 3 milliGRAM(s) Oral at bedtime PRN Insomnia      Objective:    Vitals: Vital Signs Last 24 Hrs  T(C): 36.5 (04-17-23 @ 06:23), Max: 37.3 (04-16-23 @ 12:53)  T(F): 97.7 (04-17-23 @ 06:23), Max: 99.1 (04-16-23 @ 12:53)  HR: 71 (04-17-23 @ 06:23) (71 - 98)  BP: 135/87 (04-17-23 @ 06:23) (129/91 - 136/90)  BP(mean): --  RR: 17 (04-17-23 @ 06:23) (17 - 18)  SpO2: 100% (04-17-23 @ 06:23) (100% - 100%)            I&O's Summary    16 Apr 2023 07:01  -  17 Apr 2023 07:00  --------------------------------------------------------  IN: 100 mL / OUT: 300 mL / NET: -200 mL        PHYSICAL EXAM:  GENERAL: NAD  HEAD:  No gross drainage or deformity of the right ear. No erythema or gross swelling. Tender to palpation in soft tissue anterior to auricle. No mastoid tenderness. Right-sided hearing loss.   EYES: EOMI, conjunctiva and sclera clear  CHEST/LUNG: Clear to auscultation bilaterally; No rales, rhonchi, wheezing, or rubs  HEART: Regular rate and rhythm; No murmurs, rubs, or gallops  ABDOMEN: Soft, Nontender, Nondistended;   SKIN: No rashes or lesions  NERVOUS SYSTEM:  Alert & Oriented X3, no focal deficits    LABS:  04-15    137  |  100  |  6<L>  ----------------------------<  99  4.2   |  24  |  0.62    Ca    11.2<H>      15 Apr 2023 23:36    TPro  7.8  /  Alb  4.4  /  TBili  0.6  /  DBili  x   /  AST  16  /  ALT  17  /  AlkPhos  76  04-15                                              12.8   11.64 )-----------( 266      ( 15 Apr 2023 23:36 )             42.0     CAPILLARY BLOOD GLUCOSE          RADIOLOGY & ADDITIONAL TESTS:    Imaging Personally Reviewed:  [x ] YES  [ ] NO    Consultants involved in case:   Consultant(s) Notes Reviewed:  [ x] YES  [ ] NO:   Care Discussed with Consultants/Other Providers [x ] YES  [ ] NO

## 2023-04-17 NOTE — PROGRESS NOTE ADULT - ASSESSMENT
Ms. Aguilar is a 47-yo with hx of ASCUS w/ HPV+ (never worked-up due to insurance issues), prior hospitalizations for Otitis Externa that failed oral abx requiring prolonged oral abx regimens w/ most recent Otitis Externa 3/2023 treated w/ Oral Ciprofloxacin regimen, who presents for continuing right-sided ear pain, found to have persistent Otitis Externa w/ ENT evaluating, being treated w/ IV and otic abx and pending full work-up w/ MRI.

## 2023-04-17 NOTE — PROGRESS NOTE ADULT - PROBLEM SELECTOR PLAN 5
DVT PPx: SCDs  Diet: Regular, adjust as needed - f/u AM a1c  Bowel Regimen: As needed  Code: Full  Dispo: Pt functional at b/l, consider PT/OT assessment after 24+ hrs of abx.    Pharmacy:  PCP:   Communication: DVT PPx: SCDs  Diet: Regular, adjust as needed. a1c normal.   Bowel Regimen: As needed  Code: Full  Dispo: Pt functional at b/l, consider PT/OT assessment after 24+ hrs of abx.    Pharmacy:  PCP:   Communication:

## 2023-04-17 NOTE — PROGRESS NOTE ADULT - PROBLEM SELECTOR PLAN 1
- Pt w/ recurrent episodes of Otitis Externa since 2010. Episode in 2019 was presumed to be due to cotton-swab cleaning. This current episode is most likely Otitis Externa as opposed to malignancy or cholesteatoma, however will perform work-up to rule-out.   - Patient's recurrent Otitis External w/ recurrent episodes over the course of 10 years most likely due to self-cleaning w/ cotton swabs.  - Counseled patient against using cotton swabs to clean ear.   - Ear culture results negative for bacteria.   - ENT consulted, appreciate recommendations         - IV Zosyn (4/16-)         - Otal Ciprodex BID in the right ear         - MRI IAC with Gallium         - f/u Biopsy  - Pain management with Tylenol and Ibuprofen, escalate w/ Toradol/Oxycodone as needed - Pt w/ recurrent episodes of Otitis Externa since 2010. Episode in 2019 was presumed to be due to cotton-swab cleaning. This current episode is most likely Otitis Externa as opposed to malignancy or cholesteatoma, however will perform work-up to rule-out.   - Patient's recurrent Otitis External w/ recurrent episodes over the course of 10 years most likely due to self-cleaning w/ cotton swabs.  - Counseled patient against using cotton swabs to clean ear.   - Ear culture results negative for bacteria.   - ENT consulted, appreciate recommendations         - IV Zosyn (4/16-)         - Otal Ciprodex BID in the right ear         - MRI IAC with Gallium         - f/u Biopsy  - Toradol PRN with Gabapentin 100 mg QD

## 2023-04-17 NOTE — PROGRESS NOTE ADULT - PROBLEM SELECTOR PLAN 2
- Pt presenting initially w/ hypercalcemia to 11.2, Albumin 4.4. Corrected 10.8. No hx of vitamin use or medications that may cause hypercalcemia.  - Pt asymptomatic, w/o polyuria or abdominal pain.  - CTM for now w/ serial labs, low threshold to send work-up  - f/u AM ionized calcium - Pt presenting initially w/ hypercalcemia to 11.2, Albumin 4.4. Corrected 10.8. Stably mildly elevated. No hx of vitamin use or medications that may cause hypercalcemia.  - Pt asymptomatic, w/o polyuria or abdominal pain.  - CTM for now w/ serial labs

## 2023-04-18 LAB
ALBUMIN SERPL ELPH-MCNC: 3.9 G/DL — SIGNIFICANT CHANGE UP (ref 3.3–5)
ALP SERPL-CCNC: 63 U/L — SIGNIFICANT CHANGE UP (ref 40–120)
ALT FLD-CCNC: 11 U/L — SIGNIFICANT CHANGE UP (ref 4–33)
ANION GAP SERPL CALC-SCNC: 9 MMOL/L — SIGNIFICANT CHANGE UP (ref 7–14)
AST SERPL-CCNC: 18 U/L — SIGNIFICANT CHANGE UP (ref 4–32)
BILIRUB SERPL-MCNC: 0.4 MG/DL — SIGNIFICANT CHANGE UP (ref 0.2–1.2)
BUN SERPL-MCNC: 11 MG/DL — SIGNIFICANT CHANGE UP (ref 7–23)
CA-I BLD-SCNC: 1.42 MMOL/L — HIGH (ref 1.15–1.29)
CALCIUM SERPL-MCNC: 10.5 MG/DL — SIGNIFICANT CHANGE UP (ref 8.4–10.5)
CHLORIDE SERPL-SCNC: 102 MMOL/L — SIGNIFICANT CHANGE UP (ref 98–107)
CO2 SERPL-SCNC: 26 MMOL/L — SIGNIFICANT CHANGE UP (ref 22–31)
CREAT SERPL-MCNC: 0.78 MG/DL — SIGNIFICANT CHANGE UP (ref 0.5–1.3)
EGFR: 94 ML/MIN/1.73M2 — SIGNIFICANT CHANGE UP
GLUCOSE SERPL-MCNC: 81 MG/DL — SIGNIFICANT CHANGE UP (ref 70–99)
MAGNESIUM SERPL-MCNC: 2 MG/DL — SIGNIFICANT CHANGE UP (ref 1.6–2.6)
PHOSPHATE SERPL-MCNC: 3.2 MG/DL — SIGNIFICANT CHANGE UP (ref 2.5–4.5)
POTASSIUM SERPL-MCNC: 4 MMOL/L — SIGNIFICANT CHANGE UP (ref 3.5–5.3)
POTASSIUM SERPL-SCNC: 4 MMOL/L — SIGNIFICANT CHANGE UP (ref 3.5–5.3)
PROT SERPL-MCNC: 7 G/DL — SIGNIFICANT CHANGE UP (ref 6–8.3)
SODIUM SERPL-SCNC: 137 MMOL/L — SIGNIFICANT CHANGE UP (ref 135–145)

## 2023-04-18 PROCEDURE — 69210 REMOVE IMPACTED EAR WAX UNI: CPT

## 2023-04-18 PROCEDURE — 70552 MRI BRAIN STEM W/DYE: CPT | Mod: 26

## 2023-04-18 PROCEDURE — 99233 SBSQ HOSP IP/OBS HIGH 50: CPT | Mod: GC

## 2023-04-18 PROCEDURE — 99222 1ST HOSP IP/OBS MODERATE 55: CPT | Mod: 25

## 2023-04-18 RX ORDER — KETOROLAC TROMETHAMINE 30 MG/ML
15 SYRINGE (ML) INJECTION ONCE
Refills: 0 | Status: DISCONTINUED | OUTPATIENT
Start: 2023-04-18 | End: 2023-04-18

## 2023-04-18 RX ADMIN — Medication 15 MILLIGRAM(S): at 23:17

## 2023-04-18 RX ADMIN — Medication 15 MILLIGRAM(S): at 15:23

## 2023-04-18 RX ADMIN — Medication 15 MILLIGRAM(S): at 05:13

## 2023-04-18 RX ADMIN — Medication 15 MILLIGRAM(S): at 15:08

## 2023-04-18 RX ADMIN — GABAPENTIN 100 MILLIGRAM(S): 400 CAPSULE ORAL at 12:08

## 2023-04-18 RX ADMIN — Medication 650 MILLIGRAM(S): at 18:50

## 2023-04-18 RX ADMIN — PIPERACILLIN AND TAZOBACTAM 25 GRAM(S): 4; .5 INJECTION, POWDER, LYOPHILIZED, FOR SOLUTION INTRAVENOUS at 12:08

## 2023-04-18 RX ADMIN — Medication 15 MILLIGRAM(S): at 06:13

## 2023-04-18 RX ADMIN — Medication 15 MILLIGRAM(S): at 09:45

## 2023-04-18 RX ADMIN — PIPERACILLIN AND TAZOBACTAM 25 GRAM(S): 4; .5 INJECTION, POWDER, LYOPHILIZED, FOR SOLUTION INTRAVENOUS at 02:58

## 2023-04-18 RX ADMIN — Medication 15 MILLIGRAM(S): at 09:56

## 2023-04-18 RX ADMIN — Medication 650 MILLIGRAM(S): at 05:12

## 2023-04-18 RX ADMIN — Medication 650 MILLIGRAM(S): at 06:12

## 2023-04-18 RX ADMIN — Medication 15 MILLIGRAM(S): at 23:02

## 2023-04-18 RX ADMIN — Medication 650 MILLIGRAM(S): at 19:30

## 2023-04-18 NOTE — PROGRESS NOTE ADULT - SUBJECTIVE AND OBJECTIVE BOX
***************************************************************  Bai (Ji-Cheng) [facility] PGY1  Internal Medicine   ***************************************************************    DL BRAVO  47y  MRN: [id number]    Patient is a 47y old  Female who presents with a chief complaint of Otitis Externa (17 Apr 2023 07:50)      Subjective: no events ON. Denies fever, CP, SOB, abn pain, N/V, dysuria. Tolerating diet.      MEDICATIONS  (STANDING):  ciprofloxacin  0.3% Ophthalmic Solution for Otic Use 5 Drop(s) Right Ear two times a day  dexAMETHasone 0.1% Ophthalmic Solution for OTIC Use 5 Drop(s) Right Ear two times a day  gabapentin 100 milliGRAM(s) Oral daily  piperacillin/tazobactam IVPB.. 3.375 Gram(s) IV Intermittent every 8 hours    MEDICATIONS  (PRN):  acetaminophen     Tablet .. 650 milliGRAM(s) Oral every 6 hours PRN Temp greater or equal to 38C (100.4F), Mild Pain (1 - 3)  aluminum hydroxide/magnesium hydroxide/simethicone Suspension 30 milliLiter(s) Oral every 4 hours PRN Dyspepsia  ketorolac   Injectable 15 milliGRAM(s) IV Push every 6 hours PRN Severe Pain (7 - 10)  melatonin 3 milliGRAM(s) Oral at bedtime PRN Insomnia      Objective:    Vitals: Vital Signs Last 24 Hrs  T(C): 36.9 (04-18-23 @ 05:17), Max: 37.1 (04-17-23 @ 21:11)  T(F): 98.4 (04-18-23 @ 05:17), Max: 98.7 (04-17-23 @ 21:11)  HR: 83 (04-18-23 @ 05:17) (78 - 83)  BP: 145/77 (04-18-23 @ 05:17) (138/94 - 145/77)  BP(mean): --  RR: 17 (04-18-23 @ 05:17) (17 - 17)  SpO2: 100% (04-18-23 @ 05:17) (100% - 100%)            I&O's Summary      PHYSICAL EXAM:  GENERAL: NAD  HEAD:  Atraumatic, Normocephalic  EYES: EOMI, conjunctiva and sclera clear  CHEST/LUNG: Clear to auscultation bilaterally; No rales, rhonchi, wheezing, or rubs  HEART: Regular rate and rhythm; No murmurs, rubs, or gallops  ABDOMEN: Soft, Nontender, Nondistended;   SKIN: No rashes or lesions  NERVOUS SYSTEM:  Alert & Oriented X3, no focal deficits    LABS:  04-17    134<L>  |  99  |  8   ----------------------------<  80  4.4   |  24  |  0.70  04-15    137  |  100  |  6<L>  ----------------------------<  99  4.2   |  24  |  0.62    Ca    10.5      17 Apr 2023 07:19  Ca    11.2<H>      15 Apr 2023 23:36  Phos  3.6     04-17  Mg     2.00     04-17    TPro  6.9  /  Alb  3.8  /  TBili  0.7  /  DBili  x   /  AST  16  /  ALT  12  /  AlkPhos  66  04-17  TPro  7.8  /  Alb  4.4  /  TBili  0.6  /  DBili  x   /  AST  16  /  ALT  17  /  AlkPhos  76  04-15                                              12.1   7.90  )-----------( 220      ( 17 Apr 2023 07:19 )             39.9                         12.8   11.64 )-----------( 266      ( 15 Apr 2023 23:36 )             42.0     CAPILLARY BLOOD GLUCOSE          RADIOLOGY & ADDITIONAL TESTS:    Imaging Personally Reviewed:  [x ] YES  [ ] NO    Consultants involved in case:   Consultant(s) Notes Reviewed:  [ x] YES  [ ] NO:   Care Discussed with Consultants/Other Providers [x ] YES  [ ] NO         ***************************************************************  Bai (Ji-Cheng) [facility] PGY1  Internal Medicine   ***************************************************************    DL BRAVO  47y  MRN: [id number]    Patient is a 47y old  Female who presents with a chief complaint of Otitis Externa (17 Apr 2023 07:50)      Subjective: NAEO. Still w/ ear pain, toradol improving, however ear pain still the same w/ abx treatment.     MEDICATIONS  (STANDING):  ciprofloxacin  0.3% Ophthalmic Solution for Otic Use 5 Drop(s) Right Ear two times a day  dexAMETHasone 0.1% Ophthalmic Solution for OTIC Use 5 Drop(s) Right Ear two times a day  gabapentin 100 milliGRAM(s) Oral daily  piperacillin/tazobactam IVPB.. 3.375 Gram(s) IV Intermittent every 8 hours    MEDICATIONS  (PRN):  acetaminophen     Tablet .. 650 milliGRAM(s) Oral every 6 hours PRN Temp greater or equal to 38C (100.4F), Mild Pain (1 - 3)  aluminum hydroxide/magnesium hydroxide/simethicone Suspension 30 milliLiter(s) Oral every 4 hours PRN Dyspepsia  ketorolac   Injectable 15 milliGRAM(s) IV Push every 6 hours PRN Severe Pain (7 - 10)  melatonin 3 milliGRAM(s) Oral at bedtime PRN Insomnia      Objective:    Vitals: Vital Signs Last 24 Hrs  T(C): 36.9 (04-18-23 @ 05:17), Max: 37.1 (04-17-23 @ 21:11)  T(F): 98.4 (04-18-23 @ 05:17), Max: 98.7 (04-17-23 @ 21:11)  HR: 83 (04-18-23 @ 05:17) (78 - 83)  BP: 145/77 (04-18-23 @ 05:17) (138/94 - 145/77)  BP(mean): --  RR: 17 (04-18-23 @ 05:17) (17 - 17)  SpO2: 100% (04-18-23 @ 05:17) (100% - 100%)            I&O's Summary      PHYSICAL EXAM:  GENERAL: NAD  HEAD:  No gross drainage or deformity of the right ear. No erythema or gross swelling. Tender to palpation in soft tissue anterior to auricle. No mastoid tenderness. Right-sided hearing loss, improving.   EYES: EOMI, conjunctiva and sclera clear  CHEST/LUNG: Clear to auscultation bilaterally; No rales, rhonchi, wheezing, or rubs  HEART: Regular rate and rhythm; No murmurs, rubs, or gallops  ABDOMEN: Soft, Nontender, Nondistended;   SKIN: No rashes or lesions  NERVOUS SYSTEM:  Alert & Oriented X3, no focal deficits    LABS:  04-17    134<L>  |  99  |  8   ----------------------------<  80  4.4   |  24  |  0.70  04-15    137  |  100  |  6<L>  ----------------------------<  99  4.2   |  24  |  0.62    Ca    10.5      17 Apr 2023 07:19  Ca    11.2<H>      15 Apr 2023 23:36  Phos  3.6     04-17  Mg     2.00     04-17    TPro  6.9  /  Alb  3.8  /  TBili  0.7  /  DBili  x   /  AST  16  /  ALT  12  /  AlkPhos  66  04-17  TPro  7.8  /  Alb  4.4  /  TBili  0.6  /  DBili  x   /  AST  16  /  ALT  17  /  AlkPhos  76  04-15                                              12.1   7.90  )-----------( 220      ( 17 Apr 2023 07:19 )             39.9                         12.8   11.64 )-----------( 266      ( 15 Apr 2023 23:36 )             42.0     CAPILLARY BLOOD GLUCOSE          RADIOLOGY & ADDITIONAL TESTS:    Imaging Personally Reviewed:  [x ] YES  [ ] NO    Consultants involved in case:   Consultant(s) Notes Reviewed:  [ x] YES  [ ] NO:   Care Discussed with Consultants/Other Providers [x ] YES  [ ] NO

## 2023-04-18 NOTE — PROGRESS NOTE ADULT - PROBLEM SELECTOR PLAN 1
- Pt w/ recurrent episodes of Otitis Externa since 2010. Episode in 2019 was presumed to be due to cotton-swab cleaning. This current episode is most likely Otitis Externa as opposed to malignancy or cholesteatoma, however will perform work-up to rule-out.   - Patient's recurrent Otitis External w/ recurrent episodes over the course of 10 years most likely due to self-cleaning w/ cotton swabs.  - Counseled patient against using cotton swabs to clean ear.   - Ear culture results negative for bacteria.   - ENT consulted, appreciate recommendations         - IV Zosyn (4/16-)         - Otal Ciprodex BID in the right ear         - MRI IAC with Gallium         - f/u Biopsy  - Toradol PRN with Gabapentin 100 mg QD - Pt w/ recurrent episodes of Otitis Externa since 2010. Episode in 2019 was presumed to be due to cotton-swab cleaning. Otitis Externa vs malignancy vs cholesteatoma, given lack of improvement of pain w/ antibiotics, suggests against otitis externa.  - Counseled patient against using cotton swabs to clean ear.   - Ear culture results negative for bacteria.   - ENT consulted, appreciate recommendations         - IV Zosyn (4/16-)         - Otal Ciprodex BID in the right ear         - MRI IAC with Gallium         - f/u Biopsy  - Toradol PRN with Gabapentin 100 mg QD

## 2023-04-18 NOTE — PROGRESS NOTE ADULT - PROBLEM SELECTOR PLAN 5
DVT PPx: SCDs  Diet: Regular, adjust as needed. a1c normal.   Bowel Regimen: As needed  Code: Full  Dispo: Pt functional at b/l, consider PT/OT assessment after 24+ hrs of abx.    Pharmacy:  PCP:   Communication: DVT PPx: SCDs  Diet: Regular, adjust as needed. a1c normal.   Bowel Regimen: As needed  Code: Full  Dispo: Pt functional, no PT/OT, Home.   Pharmacy:  PCP:   Communication:

## 2023-04-18 NOTE — PROGRESS NOTE ADULT - ASSESSMENT
47F PMH HTN p/w right ear pain, intially consulted 4/7 for R ear pain and dc on PO cipro. Now presenting with worsening pain. R EAC edematous, unable to visualize TM. R ear wick placed.     Exam and story consistent with malignant otitis externa vs infected cholesteatoma (less likely) vs squamous cell carcinoma of the ear (also less likely)    - f/u biopsy results to rule out SCC  - please obtain MRI IAC with Gallium to establish diagnosis of ANASTACIO and monitor treatment  - continue ciprofloxacin 0.3% ophthalmic solution 5 drops twice a day  and dexamethasone 0.1% ophthalmic solution 5 drops  twice a day to right ear  - Continue IV zosyn  - f/u ear cultures previously taken 47F PMH HTN p/w right ear pain, intially consulted 4/7 for R ear pain and dc on PO cipro. Now presenting with worsening pain. R EAC edematous, unable to visualize TM. R ear wick placed. R ear cultures previously taken shows normal skin eve and negative gram stain    Exam and story consistent with malignant otitis externa vs infected cholesteatoma (less likely) vs squamous cell carcinoma of the ear (also less likely)

## 2023-04-18 NOTE — PROGRESS NOTE ADULT - SUBJECTIVE AND OBJECTIVE BOX
ENT ISSUE/POD: right otitis externa    HPI: patient seen and examined at bedside, reports the pain has improved a little with Tylenol. Admits yellowish discharges from right ear, and decreased hearing. Denies fever, chills, dizziness, nausea and headache.        PAST MEDICAL & SURGICAL HISTORY:  Otitis externa  (with evidence of otomastoiditis on CT)      Hypertension      No significant past surgical history        Allergies    No Known Allergies    Intolerances      MEDICATIONS  (STANDING):  ciprofloxacin  0.3% Ophthalmic Solution for Otic Use 5 Drop(s) Right Ear two times a day  dexAMETHasone 0.1% Ophthalmic Solution for OTIC Use 5 Drop(s) Right Ear two times a day  gabapentin 100 milliGRAM(s) Oral daily  piperacillin/tazobactam IVPB.. 3.375 Gram(s) IV Intermittent every 8 hours    MEDICATIONS  (PRN):  acetaminophen     Tablet .. 650 milliGRAM(s) Oral every 6 hours PRN Temp greater or equal to 38C (100.4F), Mild Pain (1 - 3)  aluminum hydroxide/magnesium hydroxide/simethicone Suspension 30 milliLiter(s) Oral every 4 hours PRN Dyspepsia  ketorolac   Injectable 15 milliGRAM(s) IV Push every 6 hours PRN Severe Pain (7 - 10)  melatonin 3 milliGRAM(s) Oral at bedtime PRN Insomnia      Social History: see consult note    Family history: see consult note    ROS:   ENT: all negative except as noted in HPI   Pulm: denies SOB, cough, hemoptysis  Neuro: denies numbness/tingling, loss of sensation  Endo: denies heat/cold intolerance, excessive sweating      Vital Signs Last 24 Hrs  T(C): 36.9 (18 Apr 2023 05:17), Max: 37.1 (17 Apr 2023 21:11)  T(F): 98.4 (18 Apr 2023 05:17), Max: 98.7 (17 Apr 2023 21:11)  HR: 83 (18 Apr 2023 05:17) (78 - 83)  BP: 145/77 (18 Apr 2023 05:17) (138/94 - 145/77)  BP(mean): --  RR: 17 (18 Apr 2023 05:17) (17 - 17)  SpO2: 100% (18 Apr 2023 05:17) (100% - 100%)    Parameters below as of 18 Apr 2023 05:17  Patient On (Oxygen Delivery Method): room air                              12.1   7.90  )-----------( 220      ( 17 Apr 2023 07:19 )             39.9    04-18    137  |  102  |  11  ----------------------------<  81  4.0   |  26  |  0.78    Ca    10.5      18 Apr 2023 06:00  Phos  3.2     04-18  Mg     2.00     04-18    TPro  7.0  /  Alb  3.9  /  TBili  0.4  /  DBili  x   /  AST  18  /  ALT  11  /  AlkPhos  63  04-18       PHYSICAL EXAM:  Gen: NAD  Skin: No rashes, bruises, or lesions  Head: Normocephalic, Atraumatic  Face: no edema, erythema, or fluctuance. Parotid glands soft without mass  Eyes: no scleral injection  Ears: Right - right ear wick removed, external ear canal filled with yellowish debris, removed via alligator and curette at bedside. There is a polyp in the anterior ear canal wall. Unable to visualize TM. R ear wick replaced. No mastoid tenderness, erythema, or ear bulging            Left - ear canal clear, TM intact without effusion or erythema. No evidence of any fluid drainage. No mastoid tenderness, erythema, or ear bulging  Nose: Nares bilaterally patent, no discharge  Mouth: No Stridor / Drooling / Trismus.  Mucosa moist, tongue/uvula midline, oropharynx clear  Neck: Flat, supple, no lymphadenopathy, trachea midline, no masses  Lymphatic: No lymphadenopathy  Resp: breathing easily, no stridor  Neuro: facial nerve intact, no facial droop

## 2023-04-18 NOTE — PROGRESS NOTE ADULT - PROBLEM SELECTOR PLAN 2
- Pt presenting initially w/ hypercalcemia to 11.2, Albumin 4.4. Corrected 10.8. Stably mildly elevated. No hx of vitamin use or medications that may cause hypercalcemia.  - Pt asymptomatic, w/o polyuria or abdominal pain.  - CTM for now w/ serial labs

## 2023-04-18 NOTE — PROGRESS NOTE ADULT - PROBLEM SELECTOR PLAN 1
- f/u biopsy results to rule out SCC  - please obtain MRI IAC with Gallium to establish diagnosis of ANASTACIO and monitor treatment  - continue ciprofloxacin 0.3% ophthalmic solution 5 drops twice a day  and dexamethasone 0.1% ophthalmic solution 5 drops  twice a day to right ear  - Continue IV zosyn  - f/u ear cultures previously taken  - Case discussed with Dr. Patino - f/u biopsy results to rule out SCC  - please obtain MRI IAC with Gallium to establish diagnosis of ANASTACIO and monitor treatment  - continue ciprofloxacin 0.3% ophthalmic solution 5 drops twice a day  and dexamethasone 0.1% ophthalmic solution 5 drops  twice a day to right ear  - Continue IV zosyn  - Case discussed with Dr. Patino

## 2023-04-19 LAB
ALBUMIN SERPL ELPH-MCNC: 3.9 G/DL — SIGNIFICANT CHANGE UP (ref 3.3–5)
ALP SERPL-CCNC: 58 U/L — SIGNIFICANT CHANGE UP (ref 40–120)
ALT FLD-CCNC: 12 U/L — SIGNIFICANT CHANGE UP (ref 4–33)
ANION GAP SERPL CALC-SCNC: 9 MMOL/L — SIGNIFICANT CHANGE UP (ref 7–14)
AST SERPL-CCNC: 18 U/L — SIGNIFICANT CHANGE UP (ref 4–32)
BILIRUB SERPL-MCNC: 0.3 MG/DL — SIGNIFICANT CHANGE UP (ref 0.2–1.2)
BUN SERPL-MCNC: 15 MG/DL — SIGNIFICANT CHANGE UP (ref 7–23)
CALCIUM SERPL-MCNC: 10.3 MG/DL — SIGNIFICANT CHANGE UP (ref 8.4–10.5)
CHLORIDE SERPL-SCNC: 103 MMOL/L — SIGNIFICANT CHANGE UP (ref 98–107)
CO2 SERPL-SCNC: 26 MMOL/L — SIGNIFICANT CHANGE UP (ref 22–31)
CREAT SERPL-MCNC: 0.64 MG/DL — SIGNIFICANT CHANGE UP (ref 0.5–1.3)
EGFR: 110 ML/MIN/1.73M2 — SIGNIFICANT CHANGE UP
GLUCOSE SERPL-MCNC: 86 MG/DL — SIGNIFICANT CHANGE UP (ref 70–99)
MAGNESIUM SERPL-MCNC: 2.1 MG/DL — SIGNIFICANT CHANGE UP (ref 1.6–2.6)
PHOSPHATE SERPL-MCNC: 3.4 MG/DL — SIGNIFICANT CHANGE UP (ref 2.5–4.5)
POTASSIUM SERPL-MCNC: 4.1 MMOL/L — SIGNIFICANT CHANGE UP (ref 3.5–5.3)
POTASSIUM SERPL-SCNC: 4.1 MMOL/L — SIGNIFICANT CHANGE UP (ref 3.5–5.3)
PROT SERPL-MCNC: 6.9 G/DL — SIGNIFICANT CHANGE UP (ref 6–8.3)
SODIUM SERPL-SCNC: 138 MMOL/L — SIGNIFICANT CHANGE UP (ref 135–145)

## 2023-04-19 PROCEDURE — 99232 SBSQ HOSP IP/OBS MODERATE 35: CPT | Mod: 25

## 2023-04-19 PROCEDURE — 99233 SBSQ HOSP IP/OBS HIGH 50: CPT | Mod: GC

## 2023-04-19 PROCEDURE — 99222 1ST HOSP IP/OBS MODERATE 55: CPT

## 2023-04-19 PROCEDURE — 69210 REMOVE IMPACTED EAR WAX UNI: CPT

## 2023-04-19 RX ADMIN — Medication 15 MILLIGRAM(S): at 13:38

## 2023-04-19 RX ADMIN — Medication 650 MILLIGRAM(S): at 17:29

## 2023-04-19 RX ADMIN — Medication 5 DROP(S): at 17:01

## 2023-04-19 RX ADMIN — Medication 15 MILLIGRAM(S): at 05:03

## 2023-04-19 RX ADMIN — PIPERACILLIN AND TAZOBACTAM 25 GRAM(S): 4; .5 INJECTION, POWDER, LYOPHILIZED, FOR SOLUTION INTRAVENOUS at 21:15

## 2023-04-19 RX ADMIN — Medication 15 MILLIGRAM(S): at 05:18

## 2023-04-19 RX ADMIN — Medication 5 DROP(S): at 05:03

## 2023-04-19 RX ADMIN — Medication 15 MILLIGRAM(S): at 13:23

## 2023-04-19 RX ADMIN — PIPERACILLIN AND TAZOBACTAM 25 GRAM(S): 4; .5 INJECTION, POWDER, LYOPHILIZED, FOR SOLUTION INTRAVENOUS at 05:02

## 2023-04-19 RX ADMIN — GABAPENTIN 100 MILLIGRAM(S): 400 CAPSULE ORAL at 12:18

## 2023-04-19 RX ADMIN — Medication 15 MILLIGRAM(S): at 21:25

## 2023-04-19 RX ADMIN — PIPERACILLIN AND TAZOBACTAM 25 GRAM(S): 4; .5 INJECTION, POWDER, LYOPHILIZED, FOR SOLUTION INTRAVENOUS at 13:40

## 2023-04-19 RX ADMIN — Medication 650 MILLIGRAM(S): at 09:28

## 2023-04-19 RX ADMIN — Medication 650 MILLIGRAM(S): at 16:59

## 2023-04-19 RX ADMIN — Medication 650 MILLIGRAM(S): at 09:58

## 2023-04-19 RX ADMIN — Medication 15 MILLIGRAM(S): at 21:13

## 2023-04-19 NOTE — CONSULT NOTE ADULT - SUBJECTIVE AND OBJECTIVE BOX
HPI:  47 f with HTN, ASCUS w/ HPV+ (never worked-up due to insurance issues), multiple R ear infections 2010, 2014 in [place] and 2019 here, came to ER for R ear and throat pain 4/7 was seen by ENT and was discharged with PO cipro, came back 4/15 with in improvement in R ear pain and swelling  afebrile, WBC: 11  ca: 11.2  CT: Abnormal soft tissue within the right external auditory canal is contiguous extension into the middle ear cavity consistent with external otitis and mastoiditis. Osseous erosion is aggressive feature suggesting active disease process, favor infection versus cholesteatoma, more likely than tumor. No abscess collection is identified. No intracranial extension is identified .  ENT placed a wick and cx negative, their differentials are malignant otitis externa vs infected cholesteatoma (less likely) vs squamous cell carcinoma of the ear (also less likely)  MRI: Redemonstration of right-sided malignant otitis externa and acute right-sided otomastoiditis. No acute intracranial hemorrhage, acute ischemia, or abnormal intracranial enhancement.        PAST MEDICAL & SURGICAL HISTORY:  Otitis externa  (with evidence of otomastoiditis on CT)      Hypertension      No significant past surgical history          Allergies    No Known Allergies    Intolerances        ANTIMICROBIALS:  piperacillin/tazobactam IVPB.. 3.375 every 8 hours      OTHER MEDS:  acetaminophen     Tablet .. 650 milliGRAM(s) Oral every 6 hours PRN  aluminum hydroxide/magnesium hydroxide/simethicone Suspension 30 milliLiter(s) Oral every 4 hours PRN  ciprofloxacin  0.3% Ophthalmic Solution for Otic Use 5 Drop(s) Right Ear two times a day  dexAMETHasone 0.1% Ophthalmic Solution for OTIC Use 5 Drop(s) Right Ear two times a day  gabapentin 100 milliGRAM(s) Oral daily  ketorolac   Injectable 15 milliGRAM(s) IV Push every 6 hours PRN  melatonin 3 milliGRAM(s) Oral at bedtime PRN      SOCIAL HISTORY:  from [facility], lives alone, works as [employer]  no smoking, alcohol or drug abuse  no recent travel    FAMILY HISTORY:  No TB in the family        ROS:    All other systems negative     Constitutional: no fever, no chills, no weight loss, no night sweats  Eye: no eye pain, no redness, no vision changes  ENT:  R ear pain and swelling  Cardiovascular:  no chest pain, no palpitation  Respiratory:  no SOB, no cough  GI:  no abd pain, no vomiting, no diarrhea  urinary: no dysuria, no hematuria, no flank pain  : no vaginal discharge or bleeding  musculoskeletal:  no joint pain, no joint swelling  skin:  no rash  neurology:  no headache, no seizure, no change in mental status  psych: no anxiety, no depression     Physical Exam:    General:    NAD, non toxic  Head: atraumatic, normocephalic  Eyes: normal sclera and conjunctiva  ENT:   R ear edema, tenderness with also periauricular edema, mastoid tenderness  Cardio:    regular S1,S2  Respiratory:   clear b/l, no wheezing  abd:   soft, BS +, not tender  :     no CVAT, no suprapubic tenderness, no sow  Musculoskeletal : no joint swelling, no edema  Skin:    no rash  vascular: no phlebitis  Neurologic:     no focal deficits  psych: normal affect      Drug Dosing Weight  Height (cm): 157.5 (16 Apr 2023 15:00)  Weight (kg): 81.3 (16 Apr 2023 15:00)  BMI (kg/m2): 32.8 (16 Apr 2023 15:00)  BSA (m2): 1.82 (16 Apr 2023 15:00)    Vital Signs Last 24 Hrs  T(F): 98.7 (04-19-23 @ 14:02), Max: 99.1 (04-16-23 @ 12:53)    Vital Signs Last 24 Hrs  HR: 92 (04-19-23 @ 14:02) (80 - 92)  BP: 137/91 (04-19-23 @ 14:02) (134/93 - 142/92)  RR: 18 (04-19-23 @ 14:02)  SpO2: 100% (04-19-23 @ 14:02) (97% - 100%)  Wt(kg): --        04-19    138  |  103  |  15  ----------------------------<  86  4.1   |  26  |  0.64    Ca    10.3      19 Apr 2023 05:10  Phos  3.4     04-19  Mg     2.10     04-19    TPro  6.9  /  Alb  3.9  /  TBili  0.3  /  DBili  x   /  AST  18  /  ALT  12  /  AlkPhos  58  04-19          MICROBIOLOGY:  v  Ear Ear  04-16-23   Normal skin eve isolated  --    No polymorphonuclear leukocytes per low power field  No organisms seen per oil power field                  RADIOLOGY:    Images independently visualized and reviewed personally,  findings as below    < from: MR IAC w/ IV Cont (04.18.23 @ 20:03) >  IMPRESSION: Redemonstration of right-sided malignant otitis externa and   acute right-sided otomastoiditis.    No acute intracranial hemorrhage, acute ischemia, or abnormal   intracranial enhancement.    < from: CT Maxillofacial w/ IV Cont (04.16.23 @ 01:40) >  1.  No facial bone fracture is appreciated.    2.  Abnormal soft tissue within the right external auditory canal is   contiguous extension into the middle ear cavity consistent with external   otitis and mastoiditis. Osseous erosion is aggressive feature suggesting   active disease process, favor infection versus cholesteatoma, more likely   than tumor. No abscess collection is identified. No intracranial   extension is identified. Recommend short-term imaging follow-up      Preliminary report provided    < end of copied text >

## 2023-04-19 NOTE — PROGRESS NOTE ADULT - SUBJECTIVE AND OBJECTIVE BOX
***************************************************************  Bai (Ji-Cheng) [facility] PGY1  Internal Medicine   ***************************************************************    DL BRAVO  47y  MRN: [id number]    Patient is a 47y old  Female who presents with a chief complaint of Otitis Externa (18 Apr 2023 09:13)      Subjective: no events ON. Denies fever, CP, SOB, abn pain, N/V, dysuria. Tolerating diet.      MEDICATIONS  (STANDING):  ciprofloxacin  0.3% Ophthalmic Solution for Otic Use 5 Drop(s) Right Ear two times a day  dexAMETHasone 0.1% Ophthalmic Solution for OTIC Use 5 Drop(s) Right Ear two times a day  gabapentin 100 milliGRAM(s) Oral daily  piperacillin/tazobactam IVPB.. 3.375 Gram(s) IV Intermittent every 8 hours    MEDICATIONS  (PRN):  acetaminophen     Tablet .. 650 milliGRAM(s) Oral every 6 hours PRN Temp greater or equal to 38C (100.4F), Mild Pain (1 - 3)  aluminum hydroxide/magnesium hydroxide/simethicone Suspension 30 milliLiter(s) Oral every 4 hours PRN Dyspepsia  ketorolac   Injectable 15 milliGRAM(s) IV Push every 6 hours PRN Severe Pain (7 - 10)  melatonin 3 milliGRAM(s) Oral at bedtime PRN Insomnia      Objective:    Vitals: Vital Signs Last 24 Hrs  T(C): 36.7 (04-19-23 @ 05:04), Max: 36.8 (04-18-23 @ 21:57)  T(F): 98.1 (04-19-23 @ 05:04), Max: 98.2 (04-18-23 @ 21:57)  HR: 80 (04-19-23 @ 05:04) (80 - 89)  BP: 134/93 (04-19-23 @ 05:04) (134/93 - 142/92)  BP(mean): --  RR: 17 (04-19-23 @ 05:04) (17 - 18)  SpO2: 100% (04-19-23 @ 05:04) (97% - 100%)            I&O's Summary      PHYSICAL EXAM:  GENERAL: NAD  HEAD:  Atraumatic, Normocephalic  EYES: EOMI, conjunctiva and sclera clear  CHEST/LUNG: Clear to auscultation bilaterally; No rales, rhonchi, wheezing, or rubs  HEART: Regular rate and rhythm; No murmurs, rubs, or gallops  ABDOMEN: Soft, Nontender, Nondistended;   SKIN: No rashes or lesions  NERVOUS SYSTEM:  Alert & Oriented X3, no focal deficits    LABS:  04-18    137  |  102  |  11  ----------------------------<  81  4.0   |  26  |  0.78  04-17    134<L>  |  99  |  8   ----------------------------<  80  4.4   |  24  |  0.70    Ca    10.5      18 Apr 2023 06:00  Ca    10.5      17 Apr 2023 07:19  Phos  3.2     04-18  Mg     2.00     04-18    TPro  7.0  /  Alb  3.9  /  TBili  0.4  /  DBili  x   /  AST  18  /  ALT  11  /  AlkPhos  63  04-18  TPro  6.9  /  Alb  3.8  /  TBili  0.7  /  DBili  x   /  AST  16  /  ALT  12  /  AlkPhos  66  04-17                                              12.1   7.90  )-----------( 220      ( 17 Apr 2023 07:19 )             39.9     CAPILLARY BLOOD GLUCOSE          RADIOLOGY & ADDITIONAL TESTS:    Imaging Personally Reviewed:  [x ] YES  [ ] NO    Consultants involved in case:   Consultant(s) Notes Reviewed:  [ x] YES  [ ] NO:   Care Discussed with Consultants/Other Providers [x ] YES  [ ] NO         ***************************************************************  Bai (Ji-Cheng) [facility] PGY1  Internal Medicine   ***************************************************************    DL BRAVO  47y  MRN: [id number]    Patient is a 47y old  Female who presents with a chief complaint of Otitis Externa (18 Apr 2023 09:13)      Subjective: NAEO. Sleeping comfortably in bed. Received MRI yesterday.     MEDICATIONS  (STANDING):  ciprofloxacin  0.3% Ophthalmic Solution for Otic Use 5 Drop(s) Right Ear two times a day  dexAMETHasone 0.1% Ophthalmic Solution for OTIC Use 5 Drop(s) Right Ear two times a day  gabapentin 100 milliGRAM(s) Oral daily  piperacillin/tazobactam IVPB.. 3.375 Gram(s) IV Intermittent every 8 hours    MEDICATIONS  (PRN):  acetaminophen     Tablet .. 650 milliGRAM(s) Oral every 6 hours PRN Temp greater or equal to 38C (100.4F), Mild Pain (1 - 3)  aluminum hydroxide/magnesium hydroxide/simethicone Suspension 30 milliLiter(s) Oral every 4 hours PRN Dyspepsia  ketorolac   Injectable 15 milliGRAM(s) IV Push every 6 hours PRN Severe Pain (7 - 10)  melatonin 3 milliGRAM(s) Oral at bedtime PRN Insomnia      Objective:    Vitals: Vital Signs Last 24 Hrs  T(C): 36.7 (04-19-23 @ 05:04), Max: 36.8 (04-18-23 @ 21:57)  T(F): 98.1 (04-19-23 @ 05:04), Max: 98.2 (04-18-23 @ 21:57)  HR: 80 (04-19-23 @ 05:04) (80 - 89)  BP: 134/93 (04-19-23 @ 05:04) (134/93 - 142/92)  BP(mean): --  RR: 17 (04-19-23 @ 05:04) (17 - 18)  SpO2: 100% (04-19-23 @ 05:04) (97% - 100%)            I&O's Summary      PHYSICAL EXAM:  GENERAL: NAD  HEAD:  No gross drainage or deformity of the right ear. No erythema or gross swelling. Tender to palpation in soft tissue anterior to auricle. No mastoid tenderness. Right-sided hearing loss, improving.   EYES: EOMI, conjunctiva and sclera clear  CHEST/LUNG: Clear to auscultation bilaterally; No rales, rhonchi, wheezing, or rubs  HEART: Regular rate and rhythm; No murmurs, rubs, or gallops  ABDOMEN: Soft, Nontender, Nondistended;   SKIN: No rashes or lesions  NERVOUS SYSTEM:  Alert & Oriented X3, no focal deficits    LABS:  04-18    137  |  102  |  11  ----------------------------<  81  4.0   |  26  |  0.78  04-17    134<L>  |  99  |  8   ----------------------------<  80  4.4   |  24  |  0.70    Ca    10.5      18 Apr 2023 06:00  Ca    10.5      17 Apr 2023 07:19  Phos  3.2     04-18  Mg     2.00     04-18    TPro  7.0  /  Alb  3.9  /  TBili  0.4  /  DBili  x   /  AST  18  /  ALT  11  /  AlkPhos  63  04-18  TPro  6.9  /  Alb  3.8  /  TBili  0.7  /  DBili  x   /  AST  16  /  ALT  12  /  AlkPhos  66  04-17                                              12.1   7.90  )-----------( 220      ( 17 Apr 2023 07:19 )             39.9     CAPILLARY BLOOD GLUCOSE          RADIOLOGY & ADDITIONAL TESTS:    Imaging Personally Reviewed:  [x ] YES  [ ] NO    Consultants involved in case:   Consultant(s) Notes Reviewed:  [ x] YES  [ ] NO:   Care Discussed with Consultants/Other Providers [x ] YES  [ ] NO

## 2023-04-19 NOTE — PROGRESS NOTE ADULT - PROBLEM SELECTOR PLAN 1
- Pt w/ recurrent episodes of Otitis Externa since 2010. Episode in 2019 was presumed to be due to cotton-swab cleaning. Otitis Externa vs malignancy vs cholesteatoma, given lack of improvement of pain w/ antibiotics, suggests against otitis externa.  - Counseled patient against using cotton swabs to clean ear.   - Ear culture results negative for bacteria.   - ENT consulted, appreciate recommendations         - IV Zosyn (4/16-)         - Otal Ciprodex BID in the right ear         - MRI IAC with Gallium         - f/u Biopsy  - Toradol PRN with Gabapentin 100 mg QD - Pt w/ recurrent episodes of Otitis Externa since 2010. Episode in 2019 was presumed to be due to cotton-swab cleaning. Otitis Externa vs malignancy vs cholesteatoma, given lack of improvement of pain w/ antibiotics, suggests against otitis externa.  - Counseled patient against using cotton swabs to clean ear.   - Ear culture results negative for bacteria.   - ENT consulted, appreciate recommendations         - IV Zosyn (4/16-)         - Otal Ciprodex BID in the right ear         - MRI IAC with Gallium w/ redemonstration of malignant otitis external and otomastoiditis          - f/u Biopsy  - Toradol PRN with Gabapentin 100 mg QD  - Pt w/ continuing pain not improved w/ abx, will discuss w/ ENT

## 2023-04-19 NOTE — PROGRESS NOTE ADULT - PROBLEM SELECTOR PLAN 2
- Pt presenting initially w/ hypercalcemia to 11.2, Albumin 4.4. Corrected 10.8. Stably mildly elevated. No hx of vitamin use or medications that may cause hypercalcemia.  - Pt asymptomatic, w/o polyuria or abdominal pain.  - CTM for now w/ serial labs - Pt presenting initially w/ hypercalcemia to 11.2, Albumin 4.4. Corrected 10.8. Stably mildly elevated. No hx of vitamin use or medications that may cause hypercalcemia.  - Pt asymptomatic, w/o polyuria or abdominal pain.  - f/u PTH, Vit D 25

## 2023-04-19 NOTE — CONSULT NOTE ADULT - ASSESSMENT
47F PMH HTN p/w right ear pain, intially consulted 4/7 for R ear pain and dc on PO cipro. No presenting with worsening pain. R EAC edematous, unable to visualize TM. R ear wick placed.    - CDU  - start IV cipro  - ciprodex drops to right ear with ear wick in place, 5gtt BID  - f/u CT maxface  - f/u cx  - d/w attending
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 Osseous erosion is aggressive feature suggesting active disease process, favor infection versus cholesteatoma, more likely than tumor. No abscess collection is identified. No intracranial extension is identified .  ENT placed a wick and cx negative, their differentials are malignant otitis externa vs infected cholesteatoma (less likely) vs squamous cell carcinoma of the ear (also less likely)  MRI: Redemonstration of right-sided malignant otitis externa and acute right-sided otomastoiditis. No acute intracranial hemorrhage, acute ischemia, or abnormal intracranial enhancement.    recurrent R otitis externa now again with soft tissue edema, drainage, LAD and sánchez erosions but did not improve on cipro, no fever, WBC, CX negative with hypercalcemia which makes malignancy or a granulomatous infection more likely    * there is a biopsy from a polyp  in progress but if not helpful I believe will need a tissue biopsy and debridement for better diagnosis, she did not improve on cipro either   * c/w zosyn for now  * check HIV and QuantiFeron  * f/u with ENT    The above assessment and plan was discussed with the primary team    Mariya Huffman MD  contact on teams  After 5pm and on weekends call [phone]

## 2023-04-19 NOTE — PROGRESS NOTE ADULT - ASSESSMENT
47F PMH HTN p/w right ear pain, intially consulted 4/7 for R ear pain and dc on PO cipro. Now presenting with worsening pain. R EAC edematous, slightly improved from yesterday, more debridement done at bedside, still unable to visualize TM. R ear wick placed. R ear cultures previously taken shows normal skin eve and negative gram stain Exam and story consistent with malignant otitis externa vs infected cholesteatoma (less likely) vs squamous cell carcinoma of the ear. MRI shows redemonstration of right-sided malignant otitis externa and acute right-sided otomastoiditis.

## 2023-04-19 NOTE — PROGRESS NOTE ADULT - SUBJECTIVE AND OBJECTIVE BOX
ENT ISSUE/POD: Right otitis externa    HPI: Patient seen and examined at bedside, report the pain has improved a little. Denies tinnitus, vertigo, dizziness, otorrhea, hearing changes, fever, and chills.         PAST MEDICAL & SURGICAL HISTORY:  Otitis externa  (with evidence of otomastoiditis on CT)      Hypertension      No significant past surgical history        Allergies    No Known Allergies    Intolerances      MEDICATIONS  (STANDING):  ciprofloxacin  0.3% Ophthalmic Solution for Otic Use 5 Drop(s) Right Ear two times a day  dexAMETHasone 0.1% Ophthalmic Solution for OTIC Use 5 Drop(s) Right Ear two times a day  gabapentin 100 milliGRAM(s) Oral daily  piperacillin/tazobactam IVPB.. 3.375 Gram(s) IV Intermittent every 8 hours    MEDICATIONS  (PRN):  acetaminophen     Tablet .. 650 milliGRAM(s) Oral every 6 hours PRN Temp greater or equal to 38C (100.4F), Mild Pain (1 - 3)  aluminum hydroxide/magnesium hydroxide/simethicone Suspension 30 milliLiter(s) Oral every 4 hours PRN Dyspepsia  ketorolac   Injectable 15 milliGRAM(s) IV Push every 6 hours PRN Severe Pain (7 - 10)  melatonin 3 milliGRAM(s) Oral at bedtime PRN Insomnia      Social History: see consult note    Family history: see consult note    ROS:   ENT: all negative except as noted in HPI   Pulm: denies SOB, cough, hemoptysis  Neuro: denies numbness/tingling, loss of sensation  Endo: denies heat/cold intolerance, excessive sweating      Vital Signs Last 24 Hrs  T(C): 37.1 (19 Apr 2023 14:02), Max: 37.1 (19 Apr 2023 14:02)  T(F): 98.7 (19 Apr 2023 14:02), Max: 98.7 (19 Apr 2023 14:02)  HR: 92 (19 Apr 2023 14:02) (80 - 92)  BP: 137/91 (19 Apr 2023 14:02) (134/93 - 137/91)  BP(mean): --  RR: 18 (19 Apr 2023 14:02) (17 - 18)  SpO2: 100% (19 Apr 2023 14:02) (97% - 100%)    Parameters below as of 19 Apr 2023 14:02  Patient On (Oxygen Delivery Method): room air         04-19    138  |  103  |  15  ----------------------------<  86  4.1   |  26  |  0.64    Ca    10.3      19 Apr 2023 05:10  Phos  3.4     04-19  Mg     2.10     04-19    TPro  6.9  /  Alb  3.9  /  TBili  0.3  /  DBili  x   /  AST  18  /  ALT  12  /  AlkPhos  58  04-19       PHYSICAL EXAM:  Gen: NAD  Skin: No rashes, bruises, or lesions  Head: Normocephalic, Atraumatic  Face: no edema, erythema, or fluctuance. Parotid glands soft without mass  Eyes: no scleral injection  Ears: Right - right ear wick removed, external ear canal filled with yellowish debris, removed via alligator and curette at bedside. There is a polyp in the anterior ear canal wall. Unable to visualize TM. R ear wick replaced. No mastoid tenderness, erythema, or ear bulging            Left - ear canal clear, TM intact without effusion or erythema. No evidence of any fluid drainage. No mastoid tenderness, erythema, or ear bulging  Nose: Nares bilaterally patent, no discharge  Mouth: No Stridor / Drooling / Trismus.  Mucosa moist, tongue/uvula midline, oropharynx clear  Neck: Flat, supple, no lymphadenopathy, trachea midline, no masses  Lymphatic: No lymphadenopathy  Resp: breathing easily, no stridor  Neuro: facial nerve intact, no facial droop

## 2023-04-19 NOTE — PROGRESS NOTE ADULT - PROBLEM SELECTOR PLAN 1
- f/u biopsy results to rule out SCC  - continue ciprofloxacin 0.3% ophthalmic solution 5 drops twice a day  and dexamethasone 0.1% ophthalmic solution 5 drops  twice a day to right ear  - Continue IV zosyn per ID  - Case discussed with Dr. Patino.

## 2023-04-19 NOTE — PROGRESS NOTE ADULT - PROBLEM SELECTOR PLAN 5
The patient is Watcher - Medium risk of patient condition declining or worsening    Shift Goals  Clinical Goals: Rest, PLT transfusion, Pain control  Patient Goals: Rest  Family Goals: Keep family updated on POC    Progress made toward(s) clinical / shift goals:  Rest       Problem: Knowledge Deficit - Standard  Goal: Patient and family/care givers will demonstrate understanding of plan of care, disease process/condition, diagnostic tests and medications  Outcome: Progressing  Note: Patient aware of plan for continued monitoring, labs, and platelet transfusion           DVT PPx: SCDs  Diet: Regular, adjust as needed. a1c normal.   Bowel Regimen: As needed  Code: Full  Dispo: Pt functional, no PT/OT, Home.   Pharmacy:  PCP:   Communication:

## 2023-04-19 NOTE — PROGRESS NOTE ADULT - ASSESSMENT
Ms. Aguilar is a 47-yo with hx of ASCUS w/ HPV+ (never worked-up due to insurance issues), prior hospitalizations for Otitis Externa that failed oral abx requiring prolonged oral abx regimens w/ most recent Otitis Externa 3/2023 treated w/ Oral Ciprofloxacin regimen, who presents for continuing right-sided ear pain, found to have persistent Otitis Externa w/ ENT evaluating, being treated w/ IV and otic abx and pending full work-up w/ MRI.  Ms. Aguilar is a 47-yo with hx of ASCUS w/ HPV+ (never worked-up due to insurance issues), prior hospitalizations for Otitis Externa that failed oral abx requiring prolonged oral abx regimens w/ most recent Otitis Externa 3/2023 treated w/ Oral Ciprofloxacin regimen, who presents for continuing right-sided ear pain, found to have persistent Otitis Externa w/ ENT evaluating, being treated w/ IV and otic abx.

## 2023-04-20 LAB
24R-OH-CALCIDIOL SERPL-MCNC: 16.3 NG/ML — LOW (ref 30–80)
ALBUMIN SERPL ELPH-MCNC: 3.7 G/DL — SIGNIFICANT CHANGE UP (ref 3.3–5)
ALP SERPL-CCNC: 53 U/L — SIGNIFICANT CHANGE UP (ref 40–120)
ALT FLD-CCNC: 15 U/L — SIGNIFICANT CHANGE UP (ref 4–33)
ANION GAP SERPL CALC-SCNC: 9 MMOL/L — SIGNIFICANT CHANGE UP (ref 7–14)
AST SERPL-CCNC: 15 U/L — SIGNIFICANT CHANGE UP (ref 4–32)
BILIRUB SERPL-MCNC: 0.2 MG/DL — SIGNIFICANT CHANGE UP (ref 0.2–1.2)
BUN SERPL-MCNC: 16 MG/DL — SIGNIFICANT CHANGE UP (ref 7–23)
CALCIUM SERPL-MCNC: 9.8 MG/DL — SIGNIFICANT CHANGE UP (ref 8.4–10.5)
CHLORIDE SERPL-SCNC: 103 MMOL/L — SIGNIFICANT CHANGE UP (ref 98–107)
CO2 SERPL-SCNC: 25 MMOL/L — SIGNIFICANT CHANGE UP (ref 22–31)
CREAT SERPL-MCNC: 0.74 MG/DL — SIGNIFICANT CHANGE UP (ref 0.5–1.3)
EGFR: 100 ML/MIN/1.73M2 — SIGNIFICANT CHANGE UP
GLUCOSE SERPL-MCNC: 85 MG/DL — SIGNIFICANT CHANGE UP (ref 70–99)
HIV 1+2 AB+HIV1 P24 AG SERPL QL IA: SIGNIFICANT CHANGE UP
MAGNESIUM SERPL-MCNC: 2 MG/DL — SIGNIFICANT CHANGE UP (ref 1.6–2.6)
PHOSPHATE SERPL-MCNC: 3.3 MG/DL — SIGNIFICANT CHANGE UP (ref 2.5–4.5)
POTASSIUM SERPL-MCNC: 4.1 MMOL/L — SIGNIFICANT CHANGE UP (ref 3.5–5.3)
POTASSIUM SERPL-SCNC: 4.1 MMOL/L — SIGNIFICANT CHANGE UP (ref 3.5–5.3)
PROT SERPL-MCNC: 6.6 G/DL — SIGNIFICANT CHANGE UP (ref 6–8.3)
PTH-INTACT FLD-MCNC: 58 PG/ML — SIGNIFICANT CHANGE UP (ref 15–65)
SODIUM SERPL-SCNC: 137 MMOL/L — SIGNIFICANT CHANGE UP (ref 135–145)

## 2023-04-20 PROCEDURE — 99233 SBSQ HOSP IP/OBS HIGH 50: CPT | Mod: GC

## 2023-04-20 PROCEDURE — 99232 SBSQ HOSP IP/OBS MODERATE 35: CPT

## 2023-04-20 RX ADMIN — Medication 15 MILLIGRAM(S): at 19:23

## 2023-04-20 RX ADMIN — PIPERACILLIN AND TAZOBACTAM 25 GRAM(S): 4; .5 INJECTION, POWDER, LYOPHILIZED, FOR SOLUTION INTRAVENOUS at 11:19

## 2023-04-20 RX ADMIN — Medication 5 DROP(S): at 05:09

## 2023-04-20 RX ADMIN — PIPERACILLIN AND TAZOBACTAM 25 GRAM(S): 4; .5 INJECTION, POWDER, LYOPHILIZED, FOR SOLUTION INTRAVENOUS at 19:17

## 2023-04-20 RX ADMIN — Medication 650 MILLIGRAM(S): at 10:08

## 2023-04-20 RX ADMIN — Medication 15 MILLIGRAM(S): at 13:30

## 2023-04-20 RX ADMIN — Medication 15 MILLIGRAM(S): at 05:04

## 2023-04-20 RX ADMIN — Medication 5 DROP(S): at 19:16

## 2023-04-20 RX ADMIN — GABAPENTIN 100 MILLIGRAM(S): 400 CAPSULE ORAL at 13:11

## 2023-04-20 RX ADMIN — Medication 650 MILLIGRAM(S): at 11:08

## 2023-04-20 RX ADMIN — Medication 15 MILLIGRAM(S): at 05:19

## 2023-04-20 RX ADMIN — Medication 650 MILLIGRAM(S): at 02:40

## 2023-04-20 RX ADMIN — PIPERACILLIN AND TAZOBACTAM 25 GRAM(S): 4; .5 INJECTION, POWDER, LYOPHILIZED, FOR SOLUTION INTRAVENOUS at 05:05

## 2023-04-20 RX ADMIN — Medication 650 MILLIGRAM(S): at 01:43

## 2023-04-20 RX ADMIN — Medication 15 MILLIGRAM(S): at 19:08

## 2023-04-20 RX ADMIN — Medication 15 MILLIGRAM(S): at 13:00

## 2023-04-20 NOTE — PROGRESS NOTE ADULT - PROBLEM SELECTOR PLAN 1
- f/u biopsy of tissue from right ear canal rule out SCC, primary team will call pathology today for report follow up  - continue ciprofloxacin 0.3% ophthalmic solution 5 drops twice a day  and dexamethasone 0.1% ophthalmic solution 5 drops  twice a day to right ear  - Continue IV zosyn per ID  - Trending WBC and fever  - Case discussed with Dr. Patino.

## 2023-04-20 NOTE — PROGRESS NOTE ADULT - PROBLEM SELECTOR PLAN 1
- Pt w/ recurrent episodes of Otitis Externa since 2010. Episode in 2019 was presumed to be due to cotton-swab cleaning. Otitis Externa vs malignancy vs cholesteatoma, given lack of improvement of pain w/ antibiotics, suggests against otitis externa.  - Counseled patient against using cotton swabs to clean ear.   - Ear culture results negative for bacteria.   - ENT consulted, appreciate recommendations         - IV Zosyn (4/16-)         - Otal Ciprodex BID in the right ear         - MRI IAC with Gallium w/ redemonstration of malignant otitis external and otomastoiditis          - f/u Biopsy  - Toradol PRN with Gabapentin 100 mg QD  - Pt w/ continuing pain not improved w/ abx, will discuss w/ ENT - Pt w/ recurrent episodes of Otitis Externa since 2010. Episode in 2019 was presumed to be due to cotton-swab cleaning. Otitis Externa vs malignancy vs cholesteatoma, Pain improving after ENT debridement, could be underlying otitis externa w/ significant debris causing unimproved pain after abx, now   - Counseled patient against using cotton swabs to clean ear.   - Ear culture results negative for bacteria.   - ENT consulted, appreciate recommendations         - IV Zosyn (4/16-), will discuss w/ id regarding duration         - Otal Ciprodex BID in the right ear         - MRI IAC with Gallium w/ redemonstration of malignant otitis external and otomastoiditis          - f/u Biopsy  - Toradol PRN with Gabapentin 100 mg QD

## 2023-04-20 NOTE — PROGRESS NOTE ADULT - ASSESSMENT
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 Osseous erosion is aggressive feature suggesting active disease process, favor infection versus cholesteatoma, more likely than tumor. No abscess collection is identified. No intracranial extension is identified .  ENT placed a wick and cx negative, their differentials are malignant otitis externa vs infected cholesteatoma (less likely) vs squamous cell carcinoma of the ear (also less likely)  MRI: Redemonstration of right-sided malignant otitis externa and acute right-sided otomastoiditis. No acute intracranial hemorrhage, acute ischemia, or abnormal intracranial enhancement.    recurrent R otitis externa now again with soft tissue edema, drainage, LAD and sánchez erosions but did not improve on cipro, no fever, WBC, CX negative with hypercalcemia which makes malignancy or a granulomatous infection more likely    * there is a biopsy from a polyp  in progress but if not helpful I believe will need a tissue biopsy and debridement for better diagnosis, she did not improve on cipro either   * c/w zosyn for now, started 4/16 now day5  * f/uQuantiFeron  * f/u with ENT    The above assessment and plan was discussed with the primary team    Mariya Huffman MD  contact on teams  After 5pm and on weekends call [phone]

## 2023-04-20 NOTE — PROGRESS NOTE ADULT - PROBLEM SELECTOR PLAN 2
- Pt presenting initially w/ hypercalcemia to 11.2, Albumin 4.4. Corrected 10.8. Stably mildly elevated. No hx of vitamin use or medications that may cause hypercalcemia.  - Pt asymptomatic, w/o polyuria or abdominal pain.  - f/u PTH, Vit D 25 - Pt presenting initially w/ hypercalcemia to 11.2, Albumin 4.4. Corrected 10.8. Stably mildly elevated. No hx of vitamin use or medications that may cause hypercalcemia.  - Pt asymptomatic, w/o polyuria or abdominal pain.  - PTH wnl, will CTM. Low c/f true hypercalcemia.

## 2023-04-20 NOTE — PROGRESS NOTE ADULT - ASSESSMENT
Ms. Aguilar is a 47-yo with hx of ASCUS w/ HPV+ (never worked-up due to insurance issues), prior hospitalizations for Otitis Externa that failed oral abx requiring prolonged oral abx regimens w/ most recent Otitis Externa 3/2023 treated w/ Oral Ciprofloxacin regimen, who presents for continuing right-sided ear pain, found to have persistent Otitis Externa w/ ENT evaluating, being treated w/ IV and otic abx.

## 2023-04-20 NOTE — PROGRESS NOTE ADULT - SUBJECTIVE AND OBJECTIVE BOX
Follow Up: otitis externa     Interval History/ROS: no fever, R ear pain and edema slightly better but still present, no sore throat now, no cough diarrhea          Allergies  No Known Allergies        ANTIMICROBIALS:  piperacillin/tazobactam IVPB.. 3.375 every 8 hours      OTHER MEDS:  acetaminophen     Tablet .. 650 milliGRAM(s) Oral every 6 hours PRN  aluminum hydroxide/magnesium hydroxide/simethicone Suspension 30 milliLiter(s) Oral every 4 hours PRN  ciprofloxacin  0.3% Ophthalmic Solution for Otic Use 5 Drop(s) Right Ear two times a day  dexAMETHasone 0.1% Ophthalmic Solution for OTIC Use 5 Drop(s) Right Ear two times a day  gabapentin 100 milliGRAM(s) Oral daily  ketorolac   Injectable 15 milliGRAM(s) IV Push every 6 hours PRN  melatonin 3 milliGRAM(s) Oral at bedtime PRN      Vital Signs Last 24 Hrs  T(C): 36.7 (20 Apr 2023 14:05), Max: 37.4 (19 Apr 2023 21:12)  T(F): 98.1 (20 Apr 2023 14:05), Max: 99.3 (19 Apr 2023 21:12)  HR: 86 (20 Apr 2023 14:05) (69 - 86)  BP: 133/74 (20 Apr 2023 14:05) (133/74 - 148/90)  BP(mean): --  RR: 18 (20 Apr 2023 14:05) (17 - 18)  SpO2: 100% (20 Apr 2023 14:05) (100% - 100%)    Parameters below as of 20 Apr 2023 14:05  Patient On (Oxygen Delivery Method): room air        Physical Exam:  General:    NAD, non toxic  ENT:   R ear edema, tenderness with also periauricular edema, mastoid tenderness  Cardio:    regular S1,S2  Respiratory:   clear b/l, no wheezing  abd:   soft, BS +, not tender  :     no CVAT, no suprapubic tenderness, no sow  Musculoskeletal : no joint swelling, no edema  Skin:    no rash  vascular: no phlebitis    04-20    137  |  103  |  16  ----------------------------<  85  4.1   |  25  |  0.74    Ca    9.8      20 Apr 2023 05:12  Phos  3.3     04-20  Mg     2.00     04-20    TPro  6.6  /  Alb  3.7  /  TBili  0.2  /  DBili  x   /  AST  15  /  ALT  15  /  AlkPhos  53  04-20          MICROBIOLOGY:  v  Ear Ear  04-16-23   Normal skin eve isolated  --    No polymorphonuclear leukocytes per low power field  No organisms seen per oil power field                RADIOLOGY:  Images independently visualized and reviewed personally, findings as below  < from: MR IAC w/ IV Cont (04.18.23 @ 20:03) >  IMPRESSION: Redemonstration of right-sided malignant otitis externa and   acute right-sided otomastoiditis.    No acute intracranial hemorrhage, acute ischemia, or abnormal   intracranial enhancement.    < end of copied text >  < from: CT Maxillofacial w/ IV Cont (04.16.23 @ 01:40) >  1.  No facial bone fracture is appreciated.    2.  Abnormal soft tissue within the right external auditory canal is   contiguous extension into the middle ear cavity consistent with external   otitis and mastoiditis. Osseous erosion is aggressive feature suggesting   active disease process, favor infection versus cholesteatoma, more likely   than tumor. No abscess collection is identified. No intracranial   extension is identified. Recommend short-term imaging follow-up      Preliminary report provided    < end of copied text >

## 2023-04-20 NOTE — PROGRESS NOTE ADULT - PROBLEM SELECTOR PLAN 5
DVT PPx: SCDs  Diet: Regular, adjust as needed. a1c normal.   Bowel Regimen: As needed  Code: Full  Dispo: Pt functional, no PT/OT, Home.   Pharmacy:  PCP:   Communication: no

## 2023-04-20 NOTE — PROGRESS NOTE ADULT - SUBJECTIVE AND OBJECTIVE BOX
ENT ISSUE/POD: right otitis externa    HPI: patient seen and examined at bedside, report the pain has improved. No acute event overnight. Denies fever, chills, SOB cough, headache, otorrhea, changes in hearing, tinnitus, vertigo, nausea and vomiting.         PAST MEDICAL & SURGICAL HISTORY:  Otitis externa  (with evidence of otomastoiditis on CT)      Hypertension      No significant past surgical history        Allergies    No Known Allergies    Intolerances      MEDICATIONS  (STANDING):  ciprofloxacin  0.3% Ophthalmic Solution for Otic Use 5 Drop(s) Right Ear two times a day  dexAMETHasone 0.1% Ophthalmic Solution for OTIC Use 5 Drop(s) Right Ear two times a day  gabapentin 100 milliGRAM(s) Oral daily  piperacillin/tazobactam IVPB.. 3.375 Gram(s) IV Intermittent every 8 hours    MEDICATIONS  (PRN):  acetaminophen     Tablet .. 650 milliGRAM(s) Oral every 6 hours PRN Temp greater or equal to 38C (100.4F), Mild Pain (1 - 3)  aluminum hydroxide/magnesium hydroxide/simethicone Suspension 30 milliLiter(s) Oral every 4 hours PRN Dyspepsia  ketorolac   Injectable 15 milliGRAM(s) IV Push every 6 hours PRN Severe Pain (7 - 10)  melatonin 3 milliGRAM(s) Oral at bedtime PRN Insomnia      Social History: see consult note    Family history: see consult note    ROS:   ENT: all negative except as noted in HPI   Pulm: denies SOB, cough, hemoptysis  Neuro: denies numbness/tingling, loss of sensation  Endo: denies heat/cold intolerance, excessive sweating      Vital Signs Last 24 Hrs  T(C): 36.9 (20 Apr 2023 05:08), Max: 37.4 (19 Apr 2023 21:12)  T(F): 98.4 (20 Apr 2023 05:08), Max: 99.3 (19 Apr 2023 21:12)  HR: 69 (20 Apr 2023 05:08) (69 - 92)  BP: 136/85 (20 Apr 2023 05:08) (136/85 - 148/90)  BP(mean): --  RR: 18 (20 Apr 2023 05:08) (17 - 18)  SpO2: 100% (20 Apr 2023 05:08) (100% - 100%)    Parameters below as of 20 Apr 2023 05:08  Patient On (Oxygen Delivery Method): room air         04-20    137  |  103  |  16  ----------------------------<  85  4.1   |  25  |  0.74    Ca    9.8      20 Apr 2023 05:12  Phos  3.3     04-20  Mg     2.00     04-20    TPro  6.6  /  Alb  3.7  /  TBili  0.2  /  DBili  x   /  AST  15  /  ALT  15  /  AlkPhos  53  04-20       PHYSICAL EXAM:  Gen: NAD  Skin: No rashes, bruises, or lesions  Head: Normocephalic, Atraumatic  Face: no edema, erythema, or fluctuance. Parotid glands soft without mass  Eyes: no scleral injection  Ears: Right - right ear wick removed, external ear canal filled with yellowish debris, removed via alligator and curette at bedside. There is a polyp in the anterior ear canal wall. Unable to visualize TM. R ear wick replaced. No mastoid tenderness, erythema, or ear bulging            Left - ear canal clear, TM intact without effusion or erythema. No evidence of any fluid drainage. No mastoid tenderness, erythema, or ear bulgingNose: Nares bilaterally patent, no discharge  Mouth: No Stridor / Drooling / Trismus.  Mucosa moist, tongue/uvula midline, oropharynx clear  Neck: Flat, supple, no lymphadenopathy, trachea midline, no masses  Lymphatic: No lymphadenopathy  Resp: breathing easily, no stridor  Neuro: facial nerve intact, no facial droop

## 2023-04-20 NOTE — PROGRESS NOTE ADULT - ASSESSMENT
47F PMH HTN p/w right ear pain, intially consulted 4/7 for R ear pain and dc on PO cipro. Now presenting with worsening pain. R EAC edematous, slightly improved from yesterday, more debridement done at bedside, still unable to visualize TM. R ear wick placed. R ear cultures previously taken shows normal skin eve and negative gram stain Exam and story consistent with malignant otitis externa vs infected cholesteatoma (less likely) vs squamous cell carcinoma of the ear. MRI shows redemonstration of right-sided malignant otitis externa and acute right-sided otomastoiditis. Spoke with ID and primary team regarding the plan from ENT

## 2023-04-20 NOTE — PROGRESS NOTE ADULT - SUBJECTIVE AND OBJECTIVE BOX
***************************************************************  Bai (Ji-Cheng) [facility] PGY1  Internal Medicine   ***************************************************************    DL BRAVO  47y  MRN: [id number]    Patient is a 47y old  Female who presents with a chief complaint of Otitis Externa (19 Apr 2023 16:58)      Subjective: no events ON. Denies fever, CP, SOB, abn pain, N/V, dysuria. Tolerating diet.      MEDICATIONS  (STANDING):  ciprofloxacin  0.3% Ophthalmic Solution for Otic Use 5 Drop(s) Right Ear two times a day  dexAMETHasone 0.1% Ophthalmic Solution for OTIC Use 5 Drop(s) Right Ear two times a day  gabapentin 100 milliGRAM(s) Oral daily  piperacillin/tazobactam IVPB.. 3.375 Gram(s) IV Intermittent every 8 hours    MEDICATIONS  (PRN):  acetaminophen     Tablet .. 650 milliGRAM(s) Oral every 6 hours PRN Temp greater or equal to 38C (100.4F), Mild Pain (1 - 3)  aluminum hydroxide/magnesium hydroxide/simethicone Suspension 30 milliLiter(s) Oral every 4 hours PRN Dyspepsia  ketorolac   Injectable 15 milliGRAM(s) IV Push every 6 hours PRN Severe Pain (7 - 10)  melatonin 3 milliGRAM(s) Oral at bedtime PRN Insomnia      Objective:    Vitals: Vital Signs Last 24 Hrs  T(C): 36.9 (04-20-23 @ 05:08), Max: 37.4 (04-19-23 @ 21:12)  T(F): 98.4 (04-20-23 @ 05:08), Max: 99.3 (04-19-23 @ 21:12)  HR: 69 (04-20-23 @ 05:08) (69 - 92)  BP: 136/85 (04-20-23 @ 05:08) (136/85 - 148/90)  BP(mean): --  RR: 18 (04-20-23 @ 05:08) (17 - 18)  SpO2: 100% (04-20-23 @ 05:08) (100% - 100%)            I&O's Summary    19 Apr 2023 07:01  -  20 Apr 2023 07:00  --------------------------------------------------------  IN: 200 mL / OUT: 800 mL / NET: -600 mL        PHYSICAL EXAM:  GENERAL: NAD  HEAD:  Atraumatic, Normocephalic  EYES: EOMI, conjunctiva and sclera clear  CHEST/LUNG: Clear to auscultation bilaterally; No rales, rhonchi, wheezing, or rubs  HEART: Regular rate and rhythm; No murmurs, rubs, or gallops  ABDOMEN: Soft, Nontender, Nondistended;   SKIN: No rashes or lesions  NERVOUS SYSTEM:  Alert & Oriented X3, no focal deficits    LABS:  04-20    137  |  103  |  16  ----------------------------<  85  4.1   |  25  |  0.74  04-19    138  |  103  |  15  ----------------------------<  86  4.1   |  26  |  0.64  04-18    137  |  102  |  11  ----------------------------<  81  4.0   |  26  |  0.78    Ca    9.8      20 Apr 2023 05:12  Ca    10.3      19 Apr 2023 05:10  Ca    10.5      18 Apr 2023 06:00  Phos  3.3     04-20  Mg     2.00     04-20    TPro  6.6  /  Alb  3.7  /  TBili  0.2  /  DBili  x   /  AST  15  /  ALT  15  /  AlkPhos  53  04-20  TPro  6.9  /  Alb  3.9  /  TBili  0.3  /  DBili  x   /  AST  18  /  ALT  12  /  AlkPhos  58  04-19  TPro  7.0  /  Alb  3.9  /  TBili  0.4  /  DBili  x   /  AST  18  /  ALT  11  /  AlkPhos  63  04-18                          CAPILLARY BLOOD GLUCOSE          RADIOLOGY & ADDITIONAL TESTS:    Imaging Personally Reviewed:  [x ] YES  [ ] NO    Consultants involved in case:   Consultant(s) Notes Reviewed:  [ x] YES  [ ] NO:   Care Discussed with Consultants/Other Providers [x ] YES  [ ] NO         ***************************************************************  Bai (Ji-Cheng) [facility] PGY1  Internal Medicine   ***************************************************************    DL BRAVO  47y  MRN: [id number]    Patient is a 47y old  Female who presents with a chief complaint of Otitis Externa (19 Apr 2023 16:58)      Subjective: NAEO. ENT debridement yesterday, now w/ improved pain.     MEDICATIONS  (STANDING):  ciprofloxacin  0.3% Ophthalmic Solution for Otic Use 5 Drop(s) Right Ear two times a day  dexAMETHasone 0.1% Ophthalmic Solution for OTIC Use 5 Drop(s) Right Ear two times a day  gabapentin 100 milliGRAM(s) Oral daily  piperacillin/tazobactam IVPB.. 3.375 Gram(s) IV Intermittent every 8 hours    MEDICATIONS  (PRN):  acetaminophen     Tablet .. 650 milliGRAM(s) Oral every 6 hours PRN Temp greater or equal to 38C (100.4F), Mild Pain (1 - 3)  aluminum hydroxide/magnesium hydroxide/simethicone Suspension 30 milliLiter(s) Oral every 4 hours PRN Dyspepsia  ketorolac   Injectable 15 milliGRAM(s) IV Push every 6 hours PRN Severe Pain (7 - 10)  melatonin 3 milliGRAM(s) Oral at bedtime PRN Insomnia      Objective:    Vitals: Vital Signs Last 24 Hrs  T(C): 36.9 (04-20-23 @ 05:08), Max: 37.4 (04-19-23 @ 21:12)  T(F): 98.4 (04-20-23 @ 05:08), Max: 99.3 (04-19-23 @ 21:12)  HR: 69 (04-20-23 @ 05:08) (69 - 92)  BP: 136/85 (04-20-23 @ 05:08) (136/85 - 148/90)  BP(mean): --  RR: 18 (04-20-23 @ 05:08) (17 - 18)  SpO2: 100% (04-20-23 @ 05:08) (100% - 100%)            I&O's Summary    19 Apr 2023 07:01  -  20 Apr 2023 07:00  --------------------------------------------------------  IN: 200 mL / OUT: 800 mL / NET: -600 mL        PHYSICAL EXAM:  GENERAL: NAD  HEAD: No gross drainage or deformity of the right ear. No erythema or gross swelling. Tender to palpation in soft tissue anterior to auricle. No mastoid tenderness. Right-sided hearing loss, improving.   EYES: EOMI, conjunctiva and sclera clear  CHEST/LUNG: Clear to auscultation bilaterally; No rales, rhonchi, wheezing, or rubs  HEART: Regular rate and rhythm; No murmurs, rubs, or gallops  ABDOMEN: Soft, Nontender, Nondistended;   SKIN: No rashes or lesions  NERVOUS SYSTEM:  Alert & Oriented X3, no focal deficits    LABS:  04-20    137  |  103  |  16  ----------------------------<  85  4.1   |  25  |  0.74  04-19    138  |  103  |  15  ----------------------------<  86  4.1   |  26  |  0.64  04-18    137  |  102  |  11  ----------------------------<  81  4.0   |  26  |  0.78    Ca    9.8      20 Apr 2023 05:12  Ca    10.3      19 Apr 2023 05:10  Ca    10.5      18 Apr 2023 06:00  Phos  3.3     04-20  Mg     2.00     04-20    TPro  6.6  /  Alb  3.7  /  TBili  0.2  /  DBili  x   /  AST  15  /  ALT  15  /  AlkPhos  53  04-20  TPro  6.9  /  Alb  3.9  /  TBili  0.3  /  DBili  x   /  AST  18  /  ALT  12  /  AlkPhos  58  04-19  TPro  7.0  /  Alb  3.9  /  TBili  0.4  /  DBili  x   /  AST  18  /  ALT  11  /  AlkPhos  63  04-18                          CAPILLARY BLOOD GLUCOSE          RADIOLOGY & ADDITIONAL TESTS:    Imaging Personally Reviewed:  [x ] YES  [ ] NO    Consultants involved in case:   Consultant(s) Notes Reviewed:  [ x] YES  [ ] NO:   Care Discussed with Consultants/Other Providers [x ] YES  [ ] NO

## 2023-04-21 LAB
ALBUMIN SERPL ELPH-MCNC: 3.8 G/DL — SIGNIFICANT CHANGE UP (ref 3.3–5)
ALP SERPL-CCNC: 54 U/L — SIGNIFICANT CHANGE UP (ref 40–120)
ALT FLD-CCNC: 14 U/L — SIGNIFICANT CHANGE UP (ref 4–33)
ANION GAP SERPL CALC-SCNC: 11 MMOL/L — SIGNIFICANT CHANGE UP (ref 7–14)
AST SERPL-CCNC: 16 U/L — SIGNIFICANT CHANGE UP (ref 4–32)
BASOPHILS # BLD AUTO: 0.04 K/UL — SIGNIFICANT CHANGE UP (ref 0–0.2)
BASOPHILS NFR BLD AUTO: 0.6 % — SIGNIFICANT CHANGE UP (ref 0–2)
BILIRUB SERPL-MCNC: 0.2 MG/DL — SIGNIFICANT CHANGE UP (ref 0.2–1.2)
BUN SERPL-MCNC: 12 MG/DL — SIGNIFICANT CHANGE UP (ref 7–23)
CALCIUM SERPL-MCNC: 10.1 MG/DL — SIGNIFICANT CHANGE UP (ref 8.4–10.5)
CHLORIDE SERPL-SCNC: 103 MMOL/L — SIGNIFICANT CHANGE UP (ref 98–107)
CO2 SERPL-SCNC: 23 MMOL/L — SIGNIFICANT CHANGE UP (ref 22–31)
CREAT SERPL-MCNC: 0.68 MG/DL — SIGNIFICANT CHANGE UP (ref 0.5–1.3)
EGFR: 108 ML/MIN/1.73M2 — SIGNIFICANT CHANGE UP
EOSINOPHIL # BLD AUTO: 0.24 K/UL — SIGNIFICANT CHANGE UP (ref 0–0.5)
EOSINOPHIL NFR BLD AUTO: 3.5 % — SIGNIFICANT CHANGE UP (ref 0–6)
GLUCOSE SERPL-MCNC: 80 MG/DL — SIGNIFICANT CHANGE UP (ref 70–99)
HCT VFR BLD CALC: 40.4 % — SIGNIFICANT CHANGE UP (ref 34.5–45)
HGB BLD-MCNC: 12.1 G/DL — SIGNIFICANT CHANGE UP (ref 11.5–15.5)
IANC: 3.75 K/UL — SIGNIFICANT CHANGE UP (ref 1.8–7.4)
IMM GRANULOCYTES NFR BLD AUTO: 0.3 % — SIGNIFICANT CHANGE UP (ref 0–0.9)
LYMPHOCYTES # BLD AUTO: 2.16 K/UL — SIGNIFICANT CHANGE UP (ref 1–3.3)
LYMPHOCYTES # BLD AUTO: 31.8 % — SIGNIFICANT CHANGE UP (ref 13–44)
MAGNESIUM SERPL-MCNC: 2.2 MG/DL — SIGNIFICANT CHANGE UP (ref 1.6–2.6)
MCHC RBC-ENTMCNC: 24.5 PG — LOW (ref 27–34)
MCHC RBC-ENTMCNC: 30 GM/DL — LOW (ref 32–36)
MCV RBC AUTO: 81.9 FL — SIGNIFICANT CHANGE UP (ref 80–100)
MONOCYTES # BLD AUTO: 0.58 K/UL — SIGNIFICANT CHANGE UP (ref 0–0.9)
MONOCYTES NFR BLD AUTO: 8.5 % — SIGNIFICANT CHANGE UP (ref 2–14)
NEUTROPHILS # BLD AUTO: 3.75 K/UL — SIGNIFICANT CHANGE UP (ref 1.8–7.4)
NEUTROPHILS NFR BLD AUTO: 55.3 % — SIGNIFICANT CHANGE UP (ref 43–77)
NRBC # BLD: 0 /100 WBCS — SIGNIFICANT CHANGE UP (ref 0–0)
NRBC # FLD: 0 K/UL — SIGNIFICANT CHANGE UP (ref 0–0)
PHOSPHATE SERPL-MCNC: 3.4 MG/DL — SIGNIFICANT CHANGE UP (ref 2.5–4.5)
PLATELET # BLD AUTO: 187 K/UL — SIGNIFICANT CHANGE UP (ref 150–400)
POTASSIUM SERPL-MCNC: 4.3 MMOL/L — SIGNIFICANT CHANGE UP (ref 3.5–5.3)
POTASSIUM SERPL-SCNC: 4.3 MMOL/L — SIGNIFICANT CHANGE UP (ref 3.5–5.3)
PROT SERPL-MCNC: 6.9 G/DL — SIGNIFICANT CHANGE UP (ref 6–8.3)
RBC # BLD: 4.93 M/UL — SIGNIFICANT CHANGE UP (ref 3.8–5.2)
RBC # FLD: 22.3 % — HIGH (ref 10.3–14.5)
SODIUM SERPL-SCNC: 137 MMOL/L — SIGNIFICANT CHANGE UP (ref 135–145)
WBC # BLD: 6.79 K/UL — SIGNIFICANT CHANGE UP (ref 3.8–10.5)
WBC # FLD AUTO: 6.79 K/UL — SIGNIFICANT CHANGE UP (ref 3.8–10.5)

## 2023-04-21 PROCEDURE — 99232 SBSQ HOSP IP/OBS MODERATE 35: CPT

## 2023-04-21 PROCEDURE — 36573 INSJ PICC RS&I 5 YR+: CPT

## 2023-04-21 PROCEDURE — 99233 SBSQ HOSP IP/OBS HIGH 50: CPT | Mod: GC

## 2023-04-21 RX ORDER — PIPERACILLIN AND TAZOBACTAM 4; .5 G/20ML; G/20ML
INJECTION, POWDER, LYOPHILIZED, FOR SOLUTION INTRAVENOUS
Qty: 1 | Refills: 0
Start: 2023-04-21

## 2023-04-21 RX ORDER — PIPERACILLIN AND TAZOBACTAM 4; .5 G/20ML; G/20ML
0 INJECTION, POWDER, LYOPHILIZED, FOR SOLUTION INTRAVENOUS
Qty: 1 | Refills: 0
Start: 2023-04-21

## 2023-04-21 RX ADMIN — Medication 5 DROP(S): at 05:53

## 2023-04-21 RX ADMIN — Medication 15 MILLIGRAM(S): at 16:11

## 2023-04-21 RX ADMIN — Medication 650 MILLIGRAM(S): at 18:57

## 2023-04-21 RX ADMIN — Medication 5 DROP(S): at 18:57

## 2023-04-21 RX ADMIN — PIPERACILLIN AND TAZOBACTAM 25 GRAM(S): 4; .5 INJECTION, POWDER, LYOPHILIZED, FOR SOLUTION INTRAVENOUS at 12:13

## 2023-04-21 RX ADMIN — PIPERACILLIN AND TAZOBACTAM 25 GRAM(S): 4; .5 INJECTION, POWDER, LYOPHILIZED, FOR SOLUTION INTRAVENOUS at 03:00

## 2023-04-21 RX ADMIN — Medication 15 MILLIGRAM(S): at 09:59

## 2023-04-21 RX ADMIN — PIPERACILLIN AND TAZOBACTAM 25 GRAM(S): 4; .5 INJECTION, POWDER, LYOPHILIZED, FOR SOLUTION INTRAVENOUS at 18:57

## 2023-04-21 RX ADMIN — Medication 15 MILLIGRAM(S): at 16:26

## 2023-04-21 RX ADMIN — Medication 15 MILLIGRAM(S): at 22:30

## 2023-04-21 RX ADMIN — Medication 650 MILLIGRAM(S): at 19:55

## 2023-04-21 RX ADMIN — Medication 15 MILLIGRAM(S): at 23:30

## 2023-04-21 RX ADMIN — Medication 15 MILLIGRAM(S): at 03:15

## 2023-04-21 RX ADMIN — Medication 15 MILLIGRAM(S): at 10:14

## 2023-04-21 RX ADMIN — GABAPENTIN 100 MILLIGRAM(S): 400 CAPSULE ORAL at 12:12

## 2023-04-21 RX ADMIN — Medication 15 MILLIGRAM(S): at 03:00

## 2023-04-21 NOTE — PROGRESS NOTE ADULT - PROBLEM SELECTOR PLAN 1
- Pt w/ recurrent episodes of Otitis Externa since 2010. Episode in 2019 was presumed to be due to cotton-swab cleaning. Otitis Externa vs malignancy vs cholesteatoma, Pain improving after ENT debridement, could be underlying otitis externa w/ significant debris causing unimproved pain after abx, now   - Counseled patient against using cotton swabs to clean ear.   - Ear culture results negative for bacteria.   - ENT consulted, appreciate recommendations         - IV Zosyn (4/16-), will discuss w/ id regarding duration         - Otal Ciprodex BID in the right ear         - MRI IAC with Gallium w/ redemonstration of malignant otitis external and otomastoiditis          - f/u Biopsy  - Toradol PRN with Gabapentin 100 mg QD - Pt w/ recurrent episodes of Otitis Externa since 2010. Episode in 2019 was presumed to be due to cotton-swab cleaning. Otitis Externa vs malignancy vs cholesteatoma, Pain improving after ENT debridement.  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         - Ear culture results w/ cutibacterium acnes which ID believes is contaminant         - IV Zosyn (4/16-5/28), per ID will need 6 weeks total w/ PICC (Until May 28th), with weekly CBC/CMP/ESR/CRP, Follow-up w/ Dr. Huffman.          - Ciprofloxacin 0.3% ophthalmic solution in right ear 5 drops twice a day  and dexamethasone 0.1% ophthalmic solution 5 drops in right ear twice a day. On discharge, patient wll need ciprodex drops 5 drops BID for another 2 weeks.    - Toradol PRN with Gabapentin 100 mg QD - Pt w/ recurrent episodes of Otitis Externa since 2010. Episode in 2019 was presumed to be due to cotton-swab cleaning. Otitis Externa vs malignancy vs cholesteatoma, Pain improving after ENT debridement.  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         - Ear culture results w/ cutibacterium acnes which ID believes is contaminant         - IV Zosyn (4/16-5/28), per ID will need 6 weeks total w/ PICC (Until May 28th), with weekly CBC/CMP/ESR/CRP, Follow-up w/ Dr. Huffman.          - Ciprofloxacin 0.3% ophthalmic solution in right ear 5 drops twice a day  and dexamethasone 0.1% ophthalmic solution 5 drops in right ear twice a day. On discharge, patient wll need ciprodex drops 5 drops BID for another 2 weeks.    - Toradol PRN with Gabapentin 100 mg QD  - PICC consult placed.

## 2023-04-21 NOTE — PROGRESS NOTE ADULT - PROBLEM SELECTOR PLAN 2
- Pt presenting initially w/ hypercalcemia to 11.2, Albumin 4.4. Corrected 10.8. Stably mildly elevated. No hx of vitamin use or medications that may cause hypercalcemia.  - Pt asymptomatic, w/o polyuria or abdominal pain.  - PTH wnl, will CTM. Low c/f true hypercalcemia.

## 2023-04-21 NOTE — DIETITIAN INITIAL EVALUATION ADULT - PROBLEM SELECTOR PLAN 3
- BPs 130s/80s. On metoprolol succinate 50 and lisinopril 10 mg at home  - Hold antihypertensives for now.  - CTM BPs

## 2023-04-21 NOTE — PROGRESS NOTE ADULT - SUBJECTIVE AND OBJECTIVE BOX
Follow Up: otitis externa     Interval History/ROS: no fever, pt feels much better improved pain and edema of the R ear, no vomiting or cough          Allergies  No Known Allergies        ANTIMICROBIALS:  piperacillin/tazobactam IVPB.. 3.375 every 8 hours      OTHER MEDS:  acetaminophen     Tablet .. 650 milliGRAM(s) Oral every 6 hours PRN  aluminum hydroxide/magnesium hydroxide/simethicone Suspension 30 milliLiter(s) Oral every 4 hours PRN  ciprofloxacin  0.3% Ophthalmic Solution for Otic Use 5 Drop(s) Right Ear two times a day  dexAMETHasone 0.1% Ophthalmic Solution for OTIC Use 5 Drop(s) Right Ear two times a day  gabapentin 100 milliGRAM(s) Oral daily  ketorolac   Injectable 15 milliGRAM(s) IV Push every 6 hours PRN  melatonin 3 milliGRAM(s) Oral at bedtime PRN      Vital Signs Last 24 Hrs  T(C): 37.1 (21 Apr 2023 05:51), Max: 37.1 (21 Apr 2023 05:51)  T(F): 98.8 (21 Apr 2023 05:51), Max: 98.8 (21 Apr 2023 05:51)  HR: 79 (21 Apr 2023 05:51) (79 - 86)  BP: 129/66 (21 Apr 2023 05:51) (123/80 - 139/83)  BP(mean): --  RR: 17 (21 Apr 2023 05:51) (17 - 18)  SpO2: 100% (21 Apr 2023 05:51) (100% - 100%)    Parameters below as of 21 Apr 2023 05:51  Patient On (Oxygen Delivery Method): room air        Physical Exam:  General:    NAD, non toxic  ENT:   improved R ear edema, tenderness   Cardio:    regular S1,S2  Respiratory:   clear b/l, no wheezing  abd:   soft, BS +, not tender  :     no CVAT, no suprapubic tenderness, no sow  Musculoskeletal : no joint swelling, no edema  Skin:    no rash  vascular: no phlebitis                          12.1   6.79  )-----------( 187      ( 21 Apr 2023 05:15 )             40.4       04-21    137  |  103  |  12  ----------------------------<  80  4.3   |  23  |  0.68    Ca    10.1      21 Apr 2023 05:15  Phos  3.4     04-21  Mg     2.20     04-21    TPro  6.9  /  Alb  3.8  /  TBili  0.2  /  DBili  x   /  AST  16  /  ALT  14  /  AlkPhos  54  04-21          MICROBIOLOGY:  v  Ear Ear  04-16-23   Moderate Cutibacterium acnes "Susceptibilities not performed"  Normal skin eve isolated  --    No polymorphonuclear leukocytes per low power field  No organisms seen per oil power field                RADIOLOGY:  Images independently visualized and reviewed personally, findings as below  < from: MR IAC w/ IV Cont (04.18.23 @ 20:03) >  IMPRESSION: Redemonstration of right-sided malignant otitis externa and   acute right-sided otomastoiditis.    No acute intracranial hemorrhage, acute ischemia, or abnormal   intracranial enhancement.    < end of copied text >  < from: CT Maxillofacial w/ IV Cont (04.16.23 @ 01:40) >  1.  No facial bone fracture is appreciated.    2.  Abnormal soft tissue within the right external auditory canal is   contiguous extension into the middle ear cavity consistent with external   otitis and mastoiditis. Osseous erosion is aggressive feature suggesting   active disease process, favor infection versus cholesteatoma, more likely   than tumor. No abscess collection is identified. No intracranial   extension is identified. Recommend short-term imaging follow-up      Preliminary report provided    < end of copied text >

## 2023-04-21 NOTE — PROGRESS NOTE ADULT - PROBLEM SELECTOR PLAN 5
DVT PPx: SCDs  Diet: Regular, adjust as needed. a1c normal.   Bowel Regimen: As needed  Code: Full  Dispo: Pt functional, no PT/OT, Home.   Pharmacy:  PCP:   Communication:

## 2023-04-21 NOTE — CHART NOTE - NSCHARTNOTEFT_GEN_A_CORE
PRE-INTERVENTIONAL RADIOLOGY PROCEDURE NOTE      Patient Age: 47    Patient Gender: Female    Procedure: PICC line placement     Diagnosis/Indication: Long term-abx     Interventional Radiology Attending Physician:     Ordering Attending Physician: Dr. Baker and additionally approved by [facility] supervisor.     Pertinent Medical History: HTN    Pertinent labs:                      12.1   6.79  )-----------( 187      ( 21 Apr 2023 05:15 )             40.4       04-21    137  |  103  |  12  ----------------------------<  80  4.3   |  23  |  0.68    Ca    10.1      21 Apr 2023 05:15  Phos  3.4     04-21  Mg     2.20     04-21    TPro  6.9  /  Alb  3.8  /  TBili  0.2  /  DBili  x   /  AST  16  /  ALT  14  /  AlkPhos  54  04-21              Patient and Family Aware ? Yes

## 2023-04-21 NOTE — DIETITIAN INITIAL EVALUATION ADULT - PROBLEM SELECTOR PLAN 4
- Per HIE pt w/ previous HPV+/ASCUS never worked-up in detail due to insurance issues, saw Gyn-Onc as outpatient previously.  - Will need to refer to appropriate resources for follow-up.

## 2023-04-21 NOTE — DIETITIAN INITIAL EVALUATION ADULT - PROBLEM SELECTOR PLAN 1
- Pt w/ recurrent episodes of Otitis Externa since 2010. Episode in 2019 was presumed to be due to cotton-swab cleaning. This current episode is most likely Otitis Externa as opposed to malignancy or cholesteatoma, however will perform work-up to rule-out.   - Patient's recurrent Otitis External w/ recurrent episodes over the course of 10 years most likely due to self-cleaning w/ cotton swabs.  - Counseled patient against using cotton swabs to clean ear.   - Ear culture results negative for bacteria.   - ENT consulted, appreciate recommendations         - IV Zosyn (4/16-)         - Otal Ciprodex BID in the right ear         - MRI IAC with Gallium         - f/u Biopsy  - Pain management with Tylenol and Ibuprofen, escalate w/ Toradol/Oxycodone as needed

## 2023-04-21 NOTE — PROGRESS NOTE ADULT - ASSESSMENT
47F PMH HTN p/w right ear pain, intially consulted 4/7 for R ear pain and dc on PO cipro. Now presenting with worsening pain. R EAC improved from yesterday, more debridement done at bedside, still unable to visualize TM. R ear wick placed. R ear cultures previously taken shows   Exam and story consistent with otitis externa with polyp, less likely as malignant otitis externa. Tissue biopsy done previously to r/o squamous cell carcinoma of the ear. Result pending  MRI shows re-demonstration of right-sided malignant otitis externa and acute right-sided otomastoiditis.  47F PMH HTN p/w right ear pain, intially consulted 4/7 for R ear pain and dc on PO cipro. Now presenting with worsening pain. R EAC improved from yesterday, more debridement done at bedside, still unable to visualize TM. R ear cultures previously taken shows   Exam and story consistent with otitis externa with polyp, less likely as malignant otitis externa. Tissue biopsy done previously to r/o squamous cell carcinoma of the ear. Result pending  MRI shows re-demonstration of right-sided malignant otitis externa and acute right-sided otomastoiditis.

## 2023-04-21 NOTE — PROGRESS NOTE ADULT - PROBLEM SELECTOR PLAN 1
- Overall improving based on physical exam, more debridement done at bedside, patient tolerated well.  - f/u biopsy of tissue from right ear canal rule out SCC, primary team will call pathology today for report follow up  - continue ciprofloxacin 0.3% ophthalmic solution 5 drops twice a day  and dexamethasone 0.1% ophthalmic solution 5 drops  twice a day to right ear  - Continue IV zosyn per ID  - Trending WBC and fever  - ENT will continue to follow daily for ear debridement while patient is still admitted.  - Case discussed with Dr. Patino and senior resident  - Patient can be discharged from ENT perspective once ID establish antibiotic regiment and duration.   - Please have patient follow up outpatient with Dr. Wolfe in 1 week of discharge, please call [phone] for an appointment. - Overall improving based on physical exam, more debridement done at bedside, patient tolerated well.  - f/u biopsy of tissue from right ear canal rule out SCC, primary team will call pathology today for report follow up  - continue ciprofloxacin 0.3% ophthalmic solution 5 drops twice a day  and dexamethasone 0.1% ophthalmic solution 5 drops  twice a day to right ear, please discharge patient with ciprodex drops 5 drops BID for another 2 weeks on discharge when medically cleared from primary team  - Continue IV zosyn per ID  - Trending WBC and fever  - ENT will continue to follow daily for ear debridement while patient is still admitted.  - Case discussed with Dr. Patino and senior resident  - Patient can be discharged from ENT perspective once ID establish antibiotic regiment and duration.   - Please have patient follow up outpatient with Dr. Wolfe in 1 week of discharge, please call [phone] for an appointment.

## 2023-04-21 NOTE — DIETITIAN INITIAL EVALUATION ADULT - PROBLEM SELECTOR PLAN 5
Please co-sign.  Thank you.  DVT PPx: SCDs  Diet: Regular, adjust as needed - f/u AM a1c  Bowel Regimen: As needed  Code: Full  Dispo: Pt functional at b/l, consider PT/OT assessment after 24+ hrs of abx.    Pharmacy:  PCP:   Communication:

## 2023-04-21 NOTE — DIETITIAN INITIAL EVALUATION ADULT - ADD RECOMMEND
Yes Recommend regular diet.   Obtained food preferences, will honor as able. RDN to provide Hormel Shake 1 PO 2x daily (provides 520 kcal, 22 gm protein per 8.45oz serving).   Obtain weekly weights.  Consider vitamin D supplementation.

## 2023-04-21 NOTE — DIETITIAN INITIAL EVALUATION ADULT - PROBLEM SELECTOR PLAN 2
- Pt presenting initially w/ hypercalcemia to 11.2, Albumin 4.4. Corrected 10.8. No hx of vitamin use or medications that may cause hypercalcemia.  - Pt asymptomatic, w/o polyuria or abdominal pain.  - CTM for now w/ serial labs, low threshold to send work-up  - f/u AM ionized calcium

## 2023-04-21 NOTE — PROGRESS NOTE ADULT - ASSESSMENT
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 Osseous erosion is aggressive feature suggesting active disease process, favor infection versus cholesteatoma, more likely than tumor. No abscess collection is identified. No intracranial extension is identified .  ENT placed a wick and cx negative, their differentials are malignant otitis externa vs infected cholesteatoma (less likely) vs squamous cell carcinoma of the ear (also less likely)  MRI: Redemonstration of right-sided malignant otitis externa and acute right-sided otomastoiditis. No acute intracranial hemorrhage, acute ischemia, or abnormal intracranial enhancement.    recurrent R otitis externa now again with soft tissue edema, drainage, LAD and sánchez erosions but did not improve on cipro, no fever, WBC, CX negative with hypercalcemia which makes malignancy or a granulomatous infection more likely    * there is a biopsy from a polyp  in progress but if not helpful I believe will need a tissue biopsy and debridement for better diagnosis, she did not improve on cipro either   * c/w zosyn for now, started 4/16 now day 6  * pt is clinically better and no cx data, (cx with C. acne, which is likely skin contaminant), failed cipro so would continue with cipro to complete a 6 week course, the home dose will bee q6  * place a picc line  * f/u QuantiFeron  * f/u with ENT    The above assessment and plan was discussed with the primary team    Mariya Huffman MD  contact on teams  After 5pm and on weekends call [phone]       47 f with HTN, ASCUS w/ HPV+ (never worked-up due to insurance issues), multiple R ear infections 2010, 2014 in [place] and 2019 here, came to ER for R ear and throat pain 4/7 was seen by ENT and was discharged with PO cipro, came back 4/15 with in improvement in R ear pain and swelling  afebrile, WBC: 11  ca: 11.2  CT: Abnormal soft tissue within the right external auditory canal is contiguous extension into the middle ear cavity consistent with external otitis and mastoiditis. Osseous erosion is aggressive feature suggesting active disease process, favor infection versus cholesteatoma, more likely than tumor. No abscess collection is identified. No intracranial extension is identified .  ENT placed a wick and cx negative, their differentials are malignant otitis externa vs infected cholesteatoma (less likely) vs squamous cell carcinoma of the ear (also less likely)  MRI: Redemonstration of right-sided malignant otitis externa and acute right-sided otomastoiditis. No acute intracranial hemorrhage, acute ischemia, or abnormal intracranial enhancement.    recurrent R otitis externa now again with soft tissue edema, drainage, LAD and sánchez erosions but did not improve on cipro, no fever, WBC, CX negative with hypercalcemia which makes malignancy or a granulomatous infection more likely    * there is a biopsy from a polyp  in progress but if not helpful I believe will need a tissue biopsy and debridement for better diagnosis, she did not improve on cipro either   * c/w zosyn for now, started 4/16 now day 6  * pt is clinically better and no cx data, (cx with C. acne, which is likely skin contaminant), failed cipro so would continue with cipro to complete a 6 week course, the home dose will bee q6  * place a picc line  * f/u QuantiFeron  * f/u with ENT  * weekly CBC, CMP, ESR, CRP while on antibiotics    The above assessment and plan was discussed with the primary team    Mariya Huffman MD  contact on teams  After 5pm and on weekends call [phone]       47 f with HTN, ASCUS w/ HPV+ (never worked-up due to insurance issues), multiple R ear infections 2010, 2014 in [place] and 2019 here, came to ER for R ear and throat pain 4/7 was seen by ENT and was discharged with PO cipro, came back 4/15 with in improvement in R ear pain and swelling  afebrile, WBC: 11  ca: 11.2  CT: Abnormal soft tissue within the right external auditory canal is contiguous extension into the middle ear cavity consistent with external otitis and mastoiditis. Osseous erosion is aggressive feature suggesting active disease process, favor infection versus cholesteatoma, more likely than tumor. No abscess collection is identified. No intracranial extension is identified .  ENT placed a wick and cx negative, their differentials are malignant otitis externa vs infected cholesteatoma (less likely) vs squamous cell carcinoma of the ear (also less likely)  MRI: Redemonstration of right-sided malignant otitis externa and acute right-sided otomastoiditis. No acute intracranial hemorrhage, acute ischemia, or abnormal intracranial enhancement.    recurrent R otitis externa now again with soft tissue edema, drainage, LAD and sánchez erosions but did not improve on cipro, no fever, WBC, CX negative with hypercalcemia which makes malignancy or a granulomatous infection more likely    * there is a biopsy from a polyp  in progress but if not helpful I believe will need a tissue biopsy and debridement for better diagnosis, she did not improve on cipro either   * c/w zosyn for now, started 4/16 now day 6  * pt is clinically better and no cx data, (cx with C. acne, which is likely skin contaminant), failed cipro so would continue with zosyn to complete a 6 week course, the home dose will bee q6  * place a picc line  * f/u QuantiFeron  * f/u with ENT  * weekly CBC, CMP, ESR, CRP while on antibiotics    The above assessment and plan was discussed with the primary team    Mariya Huffman MD  contact on teams  After 5pm and on weekends call [phone]

## 2023-04-21 NOTE — DIETITIAN INITIAL EVALUATION ADULT - OTHER INFO
Per chart, pt is 47 year old female PMH ASCUS with HPV+ (never worked up due to insurance issues), prior hospitalizations for otitis externa (most recent 3/2023) presenting for continuing right sided ear pain found to have persistent otitis externa. ENT following.     NKFA. Pt lives alone and is independent with ADLs, daughter lives nearby. Pt noted with fair PO intake, tolerating diet. No noted GI distress, no bowel regimen ordered at this time. Labs notable for vitamin D deficiency.  Per chart, pt is 47 year old female PMH ASCUS with HPV+ (never worked up due to insurance issues), prior hospitalizations for otitis externa (most recent 3/2023) presenting for continuing right sided ear pain found to have persistent otitis externa. ENT following.     Pt confirms NKFA. Pt lives at home alone and is independent with ADLs, daughter lives nearby. Pt reports generally good appetite/PO intake PTA, consumes regular diet. Pt reports decreased PO intake since admission secondary to discomfort swallowing. Pt denies sensation of food getting stuck, denies coughing with PO intake. Pt vocalizes preferences for soft items such as mashed potatoes, pudding, nutrition shake, yogurt, tuna salad and fruit cups. No noted GI distress, no bowel regimen ordered at this time. Labs notable for vitamin D deficiency.

## 2023-04-21 NOTE — DIETITIAN INITIAL EVALUATION ADULT - OTHER CALCULATIONS
Dosing weight (4/16) 179.2 lbs / 81.3 kg.  Recent chart weight (11/17/22) 171 lbs.  Ideal Body Weight: 110 lbs / 50 kg +/-10%

## 2023-04-21 NOTE — PROGRESS NOTE ADULT - SUBJECTIVE AND OBJECTIVE BOX
ENT ISSUE/POD: right otitis externa s/p polyp tissue biopsy    HPI: Patient seen and examined at bedside. No acute event overnight, on IV zosyn since 4/16/2023. Culture shows moderate cutibacterium acnes, pathology pending. No acute complaints.         PAST MEDICAL & SURGICAL HISTORY:  Otitis externa  (with evidence of otomastoiditis on CT)      Hypertension      No significant past surgical history        Allergies    No Known Allergies    Intolerances      MEDICATIONS  (STANDING):  ciprofloxacin  0.3% Ophthalmic Solution for Otic Use 5 Drop(s) Right Ear two times a day  dexAMETHasone 0.1% Ophthalmic Solution for OTIC Use 5 Drop(s) Right Ear two times a day  gabapentin 100 milliGRAM(s) Oral daily  piperacillin/tazobactam IVPB.. 3.375 Gram(s) IV Intermittent every 8 hours    MEDICATIONS  (PRN):  acetaminophen     Tablet .. 650 milliGRAM(s) Oral every 6 hours PRN Temp greater or equal to 38C (100.4F), Mild Pain (1 - 3)  aluminum hydroxide/magnesium hydroxide/simethicone Suspension 30 milliLiter(s) Oral every 4 hours PRN Dyspepsia  ketorolac   Injectable 15 milliGRAM(s) IV Push every 6 hours PRN Severe Pain (7 - 10)  melatonin 3 milliGRAM(s) Oral at bedtime PRN Insomnia      Social History: see consult note    Family history: see consult note    ROS:   ENT: all negative except as noted in HPI   Pulm: denies SOB, cough, hemoptysis  Neuro: denies numbness/tingling, loss of sensation  Endo: denies heat/cold intolerance, excessive sweating      Vital Signs Last 24 Hrs  T(C): 37.1 (21 Apr 2023 05:51), Max: 37.1 (21 Apr 2023 05:51)  T(F): 98.8 (21 Apr 2023 05:51), Max: 98.8 (21 Apr 2023 05:51)  HR: 79 (21 Apr 2023 05:51) (79 - 86)  BP: 129/66 (21 Apr 2023 05:51) (123/80 - 139/83)  BP(mean): --  RR: 17 (21 Apr 2023 05:51) (17 - 18)  SpO2: 100% (21 Apr 2023 05:51) (100% - 100%)    Parameters below as of 21 Apr 2023 05:51  Patient On (Oxygen Delivery Method): room air                              12.1   6.79  )-----------( 187      ( 21 Apr 2023 05:15 )             40.4    04-21    137  |  103  |  12  ----------------------------<  80  4.3   |  23  |  0.68    Ca    10.1      21 Apr 2023 05:15  Phos  3.4     04-21  Mg     2.20     04-21    TPro  6.9  /  Alb  3.8  /  TBili  0.2  /  DBili  x   /  AST  16  /  ALT  14  /  AlkPhos  54  04-21       PHYSICAL EXAM:  Gen: NAD  Skin: No rashes, bruises, or lesions  Head: Normocephalic, Atraumatic  Face: no edema, erythema, or fluctuance. Parotid glands soft without mass  Eyes: no scleral injection  Ears: Right - right ear wick removed, external ear canal filled with yellowish debris, removed via alligator, suction and curette at bedside. Improved appearance of the external ear canal. There is a polyp in the anterior ear canal wall. Unable to visualize TM. R ear wick replaced. No mastoid tenderness, erythema, or ear bulging            Left - ear canal clear, TM intact without effusion or erythema. No evidence of any fluid drainage. No mastoid tenderness, erythema, or ear bulgingNose: Nares bilaterally patent, no dischargeNose: Nares bilaterally patent, no discharge  Mouth: No Stridor / Drooling / Trismus.  Mucosa moist, tongue/uvula midline, oropharynx clear  Neck: Flat, supple, no lymphadenopathy, trachea midline, no masses  Lymphatic: No lymphadenopathy  Resp: breathing easily, no stridor  Neuro: facial nerve intact, no facial droop         ENT ISSUE/POD: right otitis externa s/p polyp tissue biopsy    HPI: Patient seen and examined at bedside. No acute event overnight, on IV zosyn since 4/16/2023. Culture shows moderate cutibacterium acnes, pathology pending. No acute complaints.         PAST MEDICAL & SURGICAL HISTORY:  Otitis externa  (with evidence of otomastoiditis on CT)      Hypertension      No significant past surgical history        Allergies    No Known Allergies    Intolerances      MEDICATIONS  (STANDING):  ciprofloxacin  0.3% Ophthalmic Solution for Otic Use 5 Drop(s) Right Ear two times a day  dexAMETHasone 0.1% Ophthalmic Solution for OTIC Use 5 Drop(s) Right Ear two times a day  gabapentin 100 milliGRAM(s) Oral daily  piperacillin/tazobactam IVPB.. 3.375 Gram(s) IV Intermittent every 8 hours    MEDICATIONS  (PRN):  acetaminophen     Tablet .. 650 milliGRAM(s) Oral every 6 hours PRN Temp greater or equal to 38C (100.4F), Mild Pain (1 - 3)  aluminum hydroxide/magnesium hydroxide/simethicone Suspension 30 milliLiter(s) Oral every 4 hours PRN Dyspepsia  ketorolac   Injectable 15 milliGRAM(s) IV Push every 6 hours PRN Severe Pain (7 - 10)  melatonin 3 milliGRAM(s) Oral at bedtime PRN Insomnia      Social History: see consult note    Family history: see consult note    ROS:   ENT: all negative except as noted in HPI   Pulm: denies SOB, cough, hemoptysis  Neuro: denies numbness/tingling, loss of sensation  Endo: denies heat/cold intolerance, excessive sweating      Vital Signs Last 24 Hrs  T(C): 37.1 (21 Apr 2023 05:51), Max: 37.1 (21 Apr 2023 05:51)  T(F): 98.8 (21 Apr 2023 05:51), Max: 98.8 (21 Apr 2023 05:51)  HR: 79 (21 Apr 2023 05:51) (79 - 86)  BP: 129/66 (21 Apr 2023 05:51) (123/80 - 139/83)  BP(mean): --  RR: 17 (21 Apr 2023 05:51) (17 - 18)  SpO2: 100% (21 Apr 2023 05:51) (100% - 100%)    Parameters below as of 21 Apr 2023 05:51  Patient On (Oxygen Delivery Method): room air                              12.1   6.79  )-----------( 187      ( 21 Apr 2023 05:15 )             40.4    04-21    137  |  103  |  12  ----------------------------<  80  4.3   |  23  |  0.68    Ca    10.1      21 Apr 2023 05:15  Phos  3.4     04-21  Mg     2.20     04-21    TPro  6.9  /  Alb  3.8  /  TBili  0.2  /  DBili  x   /  AST  16  /  ALT  14  /  AlkPhos  54  04-21       PHYSICAL EXAM:  Gen: NAD  Skin: No rashes, bruises, or lesions  Head: Normocephalic, Atraumatic  Face: no edema, erythema, or fluctuance. Parotid glands soft without mass  Eyes: no scleral injection  Ears: Right - right ear wick removed, external ear canal filled with yellowish debris, removed via alligator, suction and curette at bedside. Improved appearance of the external ear canal. There is a polyp in the anterior ear canal wall. Unable to visualize TM due to debris. No mastoid tenderness, erythema, or ear bulging            Left - ear canal clear, TM intact without effusion or erythema. No evidence of any fluid drainage. No mastoid tenderness, erythema, or ear bulgingNose: Nares bilaterally patent, no dischargeNose: Nares bilaterally patent, no discharge  Mouth: No Stridor / Drooling / Trismus.  Mucosa moist, tongue/uvula midline, oropharynx clear  Neck: Flat, supple, no lymphadenopathy, trachea midline, no masses  Lymphatic: No lymphadenopathy  Resp: breathing easily, no stridor  Neuro: facial nerve intact, no facial droop

## 2023-04-21 NOTE — PROGRESS NOTE ADULT - SUBJECTIVE AND OBJECTIVE BOX
***************************************************************  Bai (Ji-Cheng) [facility] PGY1  Internal Medicine   ***************************************************************    DL BRAVO  47y  MRN: [id number]    Patient is a 47y old  Female who presents with a chief complaint of Otitis Externa (20 Apr 2023 18:10)      Subjective: no events ON. Denies fever, CP, SOB, abn pain, N/V, dysuria. Tolerating diet.      MEDICATIONS  (STANDING):  ciprofloxacin  0.3% Ophthalmic Solution for Otic Use 5 Drop(s) Right Ear two times a day  dexAMETHasone 0.1% Ophthalmic Solution for OTIC Use 5 Drop(s) Right Ear two times a day  gabapentin 100 milliGRAM(s) Oral daily  piperacillin/tazobactam IVPB.. 3.375 Gram(s) IV Intermittent every 8 hours    MEDICATIONS  (PRN):  acetaminophen     Tablet .. 650 milliGRAM(s) Oral every 6 hours PRN Temp greater or equal to 38C (100.4F), Mild Pain (1 - 3)  aluminum hydroxide/magnesium hydroxide/simethicone Suspension 30 milliLiter(s) Oral every 4 hours PRN Dyspepsia  ketorolac   Injectable 15 milliGRAM(s) IV Push every 6 hours PRN Severe Pain (7 - 10)  melatonin 3 milliGRAM(s) Oral at bedtime PRN Insomnia      Objective:    Vitals: Vital Signs Last 24 Hrs  T(C): 37.1 (04-21-23 @ 05:51), Max: 37.1 (04-21-23 @ 05:51)  T(F): 98.8 (04-21-23 @ 05:51), Max: 98.8 (04-21-23 @ 05:51)  HR: 79 (04-21-23 @ 05:51) (79 - 86)  BP: 129/66 (04-21-23 @ 05:51) (123/80 - 139/83)  BP(mean): --  RR: 17 (04-21-23 @ 05:51) (17 - 18)  SpO2: 100% (04-21-23 @ 05:51) (100% - 100%)            I&O's Summary    20 Apr 2023 07:01  -  21 Apr 2023 07:00  --------------------------------------------------------  IN: 1320 mL / OUT: 220 mL / NET: 1100 mL        PHYSICAL EXAM:  GENERAL: NAD  HEAD:  Atraumatic, Normocephalic  EYES: EOMI, conjunctiva and sclera clear  CHEST/LUNG: Clear to auscultation bilaterally; No rales, rhonchi, wheezing, or rubs  HEART: Regular rate and rhythm; No murmurs, rubs, or gallops  ABDOMEN: Soft, Nontender, Nondistended;   SKIN: No rashes or lesions  NERVOUS SYSTEM:  Alert & Oriented X3, no focal deficits    LABS:  04-20    137  |  103  |  16  ----------------------------<  85  4.1   |  25  |  0.74  04-19    138  |  103  |  15  ----------------------------<  86  4.1   |  26  |  0.64    Ca    9.8      20 Apr 2023 05:12  Ca    10.3      19 Apr 2023 05:10  Phos  3.3     04-20  Mg     2.00     04-20    TPro  6.6  /  Alb  3.7  /  TBili  0.2  /  DBili  x   /  AST  15  /  ALT  15  /  AlkPhos  53  04-20  TPro  6.9  /  Alb  3.9  /  TBili  0.3  /  DBili  x   /  AST  18  /  ALT  12  /  AlkPhos  58  04-19                                              12.1   6.79  )-----------( 187      ( 21 Apr 2023 05:15 )             40.4     CAPILLARY BLOOD GLUCOSE          RADIOLOGY & ADDITIONAL TESTS:    Imaging Personally Reviewed:  [x ] YES  [ ] NO    Consultants involved in case:   Consultant(s) Notes Reviewed:  [ x] YES  [ ] NO:   Care Discussed with Consultants/Other Providers [x ] YES  [ ] NO         ***************************************************************  Bai (Ji-Cheng) [facility] PGY1  Internal Medicine   ***************************************************************    DL BRAVO  47y  MRN: [id number]    Patient is a 47y old  Female who presents with a chief complaint of Otitis Externa (20 Apr 2023 18:10)      Subjective: NAEO. Walking around with improved pain after repeat debridement with ENT yesterday.       MEDICATIONS  (STANDING):  ciprofloxacin  0.3% Ophthalmic Solution for Otic Use 5 Drop(s) Right Ear two times a day  dexAMETHasone 0.1% Ophthalmic Solution for OTIC Use 5 Drop(s) Right Ear two times a day  gabapentin 100 milliGRAM(s) Oral daily  piperacillin/tazobactam IVPB.. 3.375 Gram(s) IV Intermittent every 8 hours    MEDICATIONS  (PRN):  acetaminophen     Tablet .. 650 milliGRAM(s) Oral every 6 hours PRN Temp greater or equal to 38C (100.4F), Mild Pain (1 - 3)  aluminum hydroxide/magnesium hydroxide/simethicone Suspension 30 milliLiter(s) Oral every 4 hours PRN Dyspepsia  ketorolac   Injectable 15 milliGRAM(s) IV Push every 6 hours PRN Severe Pain (7 - 10)  melatonin 3 milliGRAM(s) Oral at bedtime PRN Insomnia      Objective:    Vitals: Vital Signs Last 24 Hrs  T(C): 37.1 (04-21-23 @ 05:51), Max: 37.1 (04-21-23 @ 05:51)  T(F): 98.8 (04-21-23 @ 05:51), Max: 98.8 (04-21-23 @ 05:51)  HR: 79 (04-21-23 @ 05:51) (79 - 86)  BP: 129/66 (04-21-23 @ 05:51) (123/80 - 139/83)  BP(mean): --  RR: 17 (04-21-23 @ 05:51) (17 - 18)  SpO2: 100% (04-21-23 @ 05:51) (100% - 100%)            I&O's Summary    20 Apr 2023 07:01  -  21 Apr 2023 07:00  --------------------------------------------------------  IN: 1320 mL / OUT: 220 mL / NET: 1100 mL        PHYSICAL EXAM:  GENERAL: NAD  HEAD: No gross drainage or deformity of the right ear. No erythema or gross swelling. Tender to palpation in soft tissue anterior to auricle. No mastoid tenderness. Right-sided hearing loss, improving.   EYES: EOMI, conjunctiva and sclera clear  CHEST/LUNG: Clear to auscultation bilaterally; No rales, rhonchi, wheezing, or rubs  HEART: Regular rate and rhythm; No murmurs, rubs, or gallops  ABDOMEN: Soft, Nontender, Nondistended;   SKIN: No rashes or lesions  NERVOUS SYSTEM:  Alert & Oriented X3, no focal deficits    LABS:  04-20    137  |  103  |  16  ----------------------------<  85  4.1   |  25  |  0.74  04-19    138  |  103  |  15  ----------------------------<  86  4.1   |  26  |  0.64    Ca    9.8      20 Apr 2023 05:12  Ca    10.3      19 Apr 2023 05:10  Phos  3.3     04-20  Mg     2.00     04-20    TPro  6.6  /  Alb  3.7  /  TBili  0.2  /  DBili  x   /  AST  15  /  ALT  15  /  AlkPhos  53  04-20  TPro  6.9  /  Alb  3.9  /  TBili  0.3  /  DBili  x   /  AST  18  /  ALT  12  /  AlkPhos  58  04-19                                              12.1   6.79  )-----------( 187      ( 21 Apr 2023 05:15 )             40.4     CAPILLARY BLOOD GLUCOSE          RADIOLOGY & ADDITIONAL TESTS:    Imaging Personally Reviewed:  [x ] YES  [ ] NO    Consultants involved in case:   Consultant(s) Notes Reviewed:  [ x] YES  [ ] NO:   Care Discussed with Consultants/Other Providers [x ] YES  [ ] NO

## 2023-04-21 NOTE — PROGRESS NOTE ADULT - ASSESSMENT
Ms. Aguilar is a 47-yo with hx of ASCUS w/ HPV+ (never worked-up due to insurance issues), prior hospitalizations for Otitis Externa that failed oral abx requiring prolonged oral abx regimens w/ most recent Otitis Externa 3/2023 treated w/ Oral Ciprofloxacin regimen, who presents for continuing right-sided ear pain, found to have persistent Otitis Externa w/ ENT evaluating, being treated w/ IV and otic abx. Ms. Aguilar is a 47-yo with hx of ASCUS w/ HPV+ (never worked-up due to insurance issues), prior hospitalizations for Otitis Externa that failed oral abx requiring prolonged oral abx regimens w/ most recent Otitis Externa 3/2023 treated w/ Oral Ciprofloxacin regimen, who presents for continuing right-sided ear pain, found to have persistent Otitis Externa w/ ENT evaluating, being treated w/ IV and otic abx, now cleared from ENT perspective, with plan for long-term 6 week abx per ID.

## 2023-04-22 ENCOUNTER — TRANSCRIPTION ENCOUNTER (OUTPATIENT)
Age: 48
End: 2023-04-22

## 2023-04-22 LAB
GAMMA INTERFERON BACKGROUND BLD IA-ACNC: 0.39 IU/ML — SIGNIFICANT CHANGE UP
M TB IFN-G BLD-IMP: POSITIVE
M TB IFN-G CD4+ BCKGRND COR BLD-ACNC: 3.85 IU/ML — SIGNIFICANT CHANGE UP
M TB IFN-G CD4+CD8+ BCKGRND COR BLD-ACNC: 3.63 IU/ML — SIGNIFICANT CHANGE UP
QUANT TB PLUS MITOGEN MINUS NIL: 8.78 IU/ML — SIGNIFICANT CHANGE UP

## 2023-04-22 PROCEDURE — 99239 HOSP IP/OBS DSCHRG MGMT >30: CPT | Mod: GC

## 2023-04-22 RX ORDER — CHLORHEXIDINE GLUCONATE 213 G/1000ML
1 SOLUTION TOPICAL DAILY
Refills: 0 | Status: DISCONTINUED | OUTPATIENT
Start: 2023-04-22 | End: 2023-04-25

## 2023-04-22 RX ADMIN — Medication 15 MILLIGRAM(S): at 05:33

## 2023-04-22 RX ADMIN — CHLORHEXIDINE GLUCONATE 1 APPLICATION(S): 213 SOLUTION TOPICAL at 11:30

## 2023-04-22 RX ADMIN — Medication 15 MILLIGRAM(S): at 18:34

## 2023-04-22 RX ADMIN — PIPERACILLIN AND TAZOBACTAM 25 GRAM(S): 4; .5 INJECTION, POWDER, LYOPHILIZED, FOR SOLUTION INTRAVENOUS at 11:26

## 2023-04-22 RX ADMIN — Medication 5 DROP(S): at 05:33

## 2023-04-22 RX ADMIN — Medication 15 MILLIGRAM(S): at 05:50

## 2023-04-22 RX ADMIN — GABAPENTIN 100 MILLIGRAM(S): 400 CAPSULE ORAL at 11:42

## 2023-04-22 RX ADMIN — PIPERACILLIN AND TAZOBACTAM 25 GRAM(S): 4; .5 INJECTION, POWDER, LYOPHILIZED, FOR SOLUTION INTRAVENOUS at 03:38

## 2023-04-22 RX ADMIN — Medication 15 MILLIGRAM(S): at 13:05

## 2023-04-22 RX ADMIN — PIPERACILLIN AND TAZOBACTAM 25 GRAM(S): 4; .5 INJECTION, POWDER, LYOPHILIZED, FOR SOLUTION INTRAVENOUS at 18:34

## 2023-04-22 RX ADMIN — Medication 650 MILLIGRAM(S): at 23:16

## 2023-04-22 RX ADMIN — Medication 5 DROP(S): at 18:36

## 2023-04-22 NOTE — DISCHARGE NOTE PROVIDER - CARE PROVIDERS DIRECT ADDRESSES
[email or web address]

## 2023-04-22 NOTE — PROGRESS NOTE ADULT - SUBJECTIVE AND OBJECTIVE BOX
***************************************************************  [facility]   ***************************************************************    DL BRAVO  47y  MRN: [id number]    Patient is a 47y old  Female who presents with a chief complaint of Otitis Externa (21 Apr 2023 11:35)      Subjective: no events ON. Denies fever, CP, SOB, abn pain, N/V, dysuria. Tolerating diet.      MEDICATIONS  (STANDING):  chlorhexidine 2% Cloths 1 Application(s) Topical daily  ciprofloxacin  0.3% Ophthalmic Solution for Otic Use 5 Drop(s) Right Ear two times a day  dexAMETHasone 0.1% Ophthalmic Solution for OTIC Use 5 Drop(s) Right Ear two times a day  gabapentin 100 milliGRAM(s) Oral daily  piperacillin/tazobactam IVPB.. 3.375 Gram(s) IV Intermittent every 8 hours    MEDICATIONS  (PRN):  acetaminophen     Tablet .. 650 milliGRAM(s) Oral every 6 hours PRN Temp greater or equal to 38C (100.4F), Mild Pain (1 - 3)  aluminum hydroxide/magnesium hydroxide/simethicone Suspension 30 milliLiter(s) Oral every 4 hours PRN Dyspepsia  ketorolac   Injectable 15 milliGRAM(s) IV Push every 6 hours PRN Severe Pain (7 - 10)  melatonin 3 milliGRAM(s) Oral at bedtime PRN Insomnia      Objective:    Vitals: Vital Signs Last 24 Hrs  T(C): 37 (04-22-23 @ 05:56), Max: 37.4 (04-21-23 @ 18:13)  T(F): 98.6 (04-22-23 @ 05:56), Max: 99.3 (04-21-23 @ 18:13)  HR: 80 (04-22-23 @ 05:56) (72 - 100)  BP: 136/87 (04-22-23 @ 05:56) (114/73 - 160/99)  BP(mean): --  RR: 18 (04-22-23 @ 05:56) (18 - 19)  SpO2: 100% (04-22-23 @ 05:56) (97% - 100%)            I&O's Summary    21 Apr 2023 07:01  -  22 Apr 2023 07:00  --------------------------------------------------------  IN: 1220 mL / OUT: 0 mL / NET: 1220 mL        PHYSICAL EXAM:  GENERAL: NAD  HEAD:  Atraumatic, Normocephalic  EYES: EOMI, conjunctiva and sclera clear  CHEST/LUNG: Clear to auscultation bilaterally; No rales, rhonchi, wheezing, or rubs  HEART: Regular rate and rhythm; No murmurs, rubs, or gallops  ABDOMEN: Soft, Nontender, Nondistended;   SKIN: No rashes or lesions  NERVOUS SYSTEM:  Alert & Oriented X3, no focal deficits    LABS:  04-21    137  |  103  |  12  ----------------------------<  80  4.3   |  23  |  0.68  04-20    137  |  103  |  16  ----------------------------<  85  4.1   |  25  |  0.74    Ca    10.1      21 Apr 2023 05:15  Ca    9.8      20 Apr 2023 05:12  Phos  3.4     04-21  Mg     2.20     04-21    TPro  6.9  /  Alb  3.8  /  TBili  0.2  /  DBili  x   /  AST  16  /  ALT  14  /  AlkPhos  54  04-21  TPro  6.6  /  Alb  3.7  /  TBili  0.2  /  DBili  x   /  AST  15  /  ALT  15  /  AlkPhos  53  04-20                                              12.1   6.79  )-----------( 187      ( 21 Apr 2023 05:15 )             40.4     CAPILLARY BLOOD GLUCOSE          RADIOLOGY & ADDITIONAL TESTS:    Imaging Personally Reviewed:  [x ] YES  [ ] NO    Consultants involved in case:   Consultant(s) Notes Reviewed:  [ x] YES  [ ] NO:   Care Discussed with Consultants/Other Providers [x ] YES  [ ] NO         ***************************************************************  Bai (Ji-Cheng) [facility] PGY1  Internal Medicine   ***************************************************************    DL BRAVO  47y  MRN: [id number]    Patient is a 47y old  Female who presents with a chief complaint of Otitis Externa (21 Apr 2023 11:35)      Subjective: NAEO. Pain better. No acute complaints.      MEDICATIONS  (STANDING):  chlorhexidine 2% Cloths 1 Application(s) Topical daily  ciprofloxacin  0.3% Ophthalmic Solution for Otic Use 5 Drop(s) Right Ear two times a day  dexAMETHasone 0.1% Ophthalmic Solution for OTIC Use 5 Drop(s) Right Ear two times a day  gabapentin 100 milliGRAM(s) Oral daily  piperacillin/tazobactam IVPB.. 3.375 Gram(s) IV Intermittent every 8 hours    MEDICATIONS  (PRN):  acetaminophen     Tablet .. 650 milliGRAM(s) Oral every 6 hours PRN Temp greater or equal to 38C (100.4F), Mild Pain (1 - 3)  aluminum hydroxide/magnesium hydroxide/simethicone Suspension 30 milliLiter(s) Oral every 4 hours PRN Dyspepsia  ketorolac   Injectable 15 milliGRAM(s) IV Push every 6 hours PRN Severe Pain (7 - 10)  melatonin 3 milliGRAM(s) Oral at bedtime PRN Insomnia      Objective:    Vitals: Vital Signs Last 24 Hrs  T(C): 37 (04-22-23 @ 05:56), Max: 37.4 (04-21-23 @ 18:13)  T(F): 98.6 (04-22-23 @ 05:56), Max: 99.3 (04-21-23 @ 18:13)  HR: 80 (04-22-23 @ 05:56) (72 - 100)  BP: 136/87 (04-22-23 @ 05:56) (114/73 - 160/99)  BP(mean): --  RR: 18 (04-22-23 @ 05:56) (18 - 19)  SpO2: 100% (04-22-23 @ 05:56) (97% - 100%)            I&O's Summary    21 Apr 2023 07:01  -  22 Apr 2023 07:00  --------------------------------------------------------  IN: 1220 mL / OUT: 0 mL / NET: 1220 mL        PHYSICAL EXAM:  GENERAL: NAD  HEAD: No gross drainage or deformity of the right ear. No erythema or gross swelling.   EYES: EOMI, conjunctiva and sclera clear  CHEST/LUNG: Clear to auscultation bilaterally; No rales, rhonchi, wheezing, or rubs  HEART: Regular rate and rhythm; No murmurs, rubs, or gallops  ABDOMEN: Soft, Nontender, Nondistended;   SKIN: No rashes or lesions  NERVOUS SYSTEM:  Alert & Oriented X3, no focal deficits    LABS:  04-21    137  |  103  |  12  ----------------------------<  80  4.3   |  23  |  0.68  04-20    137  |  103  |  16  ----------------------------<  85  4.1   |  25  |  0.74    Ca    10.1      21 Apr 2023 05:15  Ca    9.8      20 Apr 2023 05:12  Phos  3.4     04-21  Mg     2.20     04-21    TPro  6.9  /  Alb  3.8  /  TBili  0.2  /  DBili  x   /  AST  16  /  ALT  14  /  AlkPhos  54  04-21  TPro  6.6  /  Alb  3.7  /  TBili  0.2  /  DBili  x   /  AST  15  /  ALT  15  /  AlkPhos  53  04-20                                              12.1   6.79  )-----------( 187      ( 21 Apr 2023 05:15 )             40.4     CAPILLARY BLOOD GLUCOSE          RADIOLOGY & ADDITIONAL TESTS:    Imaging Personally Reviewed:  [x ] YES  [ ] NO    Consultants involved in case:   Consultant(s) Notes Reviewed:  [ x] YES  [ ] NO:   Care Discussed with Consultants/Other Providers [x ] YES  [ ] NO

## 2023-04-22 NOTE — DISCHARGE NOTE PROVIDER - NSFOLLOWUPCLINICS_GEN_ALL_ED_FT
[facility]  Internal Medicine  [address] [place] [address]  Phone: [phone]  Fax: [phone]

## 2023-04-22 NOTE — DISCHARGE NOTE PROVIDER - NSDCCPTREATMENT_GEN_ALL_CORE_FT
PRINCIPAL PROCEDURE  Procedure: MRI  Findings and Treatment: MR IAC ONLY IC   ORDERED BY: ROBERT DHILLON   PROCEDURE DATE:  04/18/2023    IMPRESSION: Redemonstration of right-sided malignant otitis externa and   acute right-sided otomastoiditis.  No acute intracranial hemorrhage, acute ischemia, or abnormal   intracranial enhancement.

## 2023-04-22 NOTE — DISCHARGE NOTE PROVIDER - NSDCFUADDAPPT_GEN_ALL_CORE_FT
Please remember that you will need weekly blood testing. Please visit a [facility] lab to receive the appropriate blood testing every week.     Please perform these follow-ups:  - Infectious Disease (Dr. Mariya Huffman) in <4 weeks to discuss recent hospitalization and treatment with Zosyn.  - ENT (Dr. Wolfe) in <1 week, to follow-up on your hospitalization and discuss your biopsy results. Please call [phone] for an appointment.  - Primary Care Physician to discuss hospitalization and follow-up on calcium, low vitamin D levels, and history of ASCUS & HPV+. If you have trouble finding one, please call the number to make an appointment at [facility].

## 2023-04-22 NOTE — DISCHARGE NOTE PROVIDER - NSDCCPCAREPLAN_GEN_ALL_CORE_FT
PRINCIPAL DISCHARGE DIAGNOSIS  Diagnosis: Malignant otitis externa  Assessment and Plan of Treatment: You came in with significant ear pain. We had our ear, nose, and throat doctors see you as well as the infectious disease doctors. They performed a biopsy but also treated you with antibiotics and we believe your ear pain is due to an infection and significant debris in your ear. The ear, nose, and throat doctors cleaned out your ear and your ear pain improved on ear drops and antibiotics. We started multiple new medications for you. You have two new ear drops: Ciprofloxacin and Dexamethasone. Please take the Ciprofloxacin and Dexamethasone (Two separate drops) the same way - Please apply 5 drops in your right ear two times a day for 2 weeks, from 4/23 to 5/7. Your last dose will be on 5/7. We also inserted a long-term IV and started a long-term antibiotic for you called Zosyn. Please take the Zosyn as prescribed: Inject it every 6 hours daily, until 5/28. Your last dose will be on 5/28. During this time, you will require weekly blood testing. Please visit a [facility] lab testing center to have your blood drawn every week while you are giving yourself the antibiotics. We recommend that you take ibuprofen and tylenol as instructed on the bottle for pain.   Please follow-up with the ear, nose, and throat doctor in <1 week to discuss your hospitalization and follow-up on your biopsy results which are still pending currently. Please also follow-up with the infectious disease doctor in <4 weeks to adjust your antibiotics and assess your symptoms. Please also follow-up with your primary care doctor to discuss your hospitalization. If you begin having worsening pain, fever, weakness, or headache, please return to the hospital for immediate evaluation.      SECONDARY DISCHARGE DIAGNOSES  Diagnosis: Hypercalcemia  Assessment and Plan of Treatment: We noted that you have slightly elevated blood calcium while you were in the hospital. Our blood tests here suggested that it was temporary and you had normal values afterwards. We did, however, note that you had low vitamin D. Please follow-up with your primary care doctor for continued monitoring and evaluation of your high calcium and low vitamin D. If you have significant urination, abdominal pain that is new, please return to the hospital.    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 Please follow-up with your primary care doctor for continued follow-up.    Diagnosis: Malignant otitis externa  Assessment and Plan of Treatment:     Diagnosis: Mastoiditis  Assessment and Plan of Treatment:

## 2023-04-22 NOTE — DISCHARGE NOTE PROVIDER - HOSPITAL COURSE
Ms. Aguilar is a 47-yo with hx of ASCUS w/ HPV+ (never worked-up due to insurance issues), recurrent hospitalizations for Otitis Externa 2/2 cotton swab ear cleaning that have failed oral abx, presents for continuing ear pain despite recent regimen of Ciprofloxacin for otitis externa. Pt was seen at [facility] for 1 week of right-sided neck pain, with CT suggesting otitis externa (possibly necrotizing) vs cholesteatoma/malignancy. Patient complated a5-day course of ciprofloxacin, but pain worsened. Last admission was in 2019 for right otitis externa c/b mastoiditis in s/o cleaning ear with cotton swab, that had failed abx prior, debrided & treated w/ a longer course of Cipro.      ED course notable for vitals afebrile, HR and BPs wnl. Labs notable for white count to 11 and hypercalcemic 11.2 w/ album 4.4. Micro notable for negative ear bacterial culture. CT Maxillofacial w/ right external otitis and mastoiditis, No abscess or intracranial extension seen. ENT was consulted, recommending MRI IAC w/ Gallium to establish diagnosis of Malignant Otitis Externa, f/u Biopsy to ruleout SCC, treat w/ Zosyn and Ciprodex drops to the right ear.     Patient seen and spoken to at bedside. Endorses continuing right-sided ear pain with sore throat and right-sided hearing loss, asking for pain regimen as tylenol has not worked well at home. Pain is the same as that of her prior admissions. No subjective fever, vison changes, dizziness, or focal weakness.  Denies any recent ear manipulation or water exposure, however has been cleaning her ears with cotton swabs. Regarding her hypercalcemia, she denies any polyuria or abdominal pain. No vitamins at home besides Iron, only takes BB and ACE at home for hypertension. Ms. Aguilar is a 47-yo with hx of ASCUS w/ HPV+ (never worked-up due to insurance issues), recurrent hospitalizations for Otitis Externa 2/2 cotton swab ear cleaning that have failed oral abx, presents for continuing ear pain despite recent regimen of Ciprofloxacin for otitis externa. Pt was recently seen at [facility] for 1 week of right-sided neck pain, with CT suggesting otitis externa (possibly necrotizing) vs cholesteatoma/malignancy. Patient completed a 5-day course of ciprofloxacin, but pain worsened. Last admission was in 2019 for right otitis externa c/b mastoiditis in s/o cleaning ear with cotton swab, that had failed abx prior, debrided & treated w/ a longer course of Cipro. ED course notable for vitals afebrile, HR and BPs wnl. Labs notable for white count to 11 and hypercalcemic 11.2 w/ album 4.4. Micro notable for negative ear bacterial culture. CT Maxillofacial w/ right external otitis and mastoiditis, No abscess or intracranial extension seen. ENT was consulted, recommending MRI IAC w/ Gallium to establish diagnosis of Malignant Otitis Externa, f/u Biopsy to ruleout SCC, treat w/ Zosyn and Ciprodex drops to the right ear. Patient initially with continuing right-sided ear pain with sore throat and right-sided hearing loss, asking for pain regimen as tylenol has not worked well at home. Pain is the same as that of her prior admissions. No subjective fever, vison changes, dizziness, or focal weakness.  Denies any recent ear manipulation or water exposure, however has been cleaning her ears with cotton swabs. Regarding her hypercalcemia, she denies any polyuria or abdominal pain. No vitamins at home besides Iron, only takes BB and ACE at home for hypertension.    Patient was admitted for malignant otitis externa. MRI work-up confirmed findings c/w malignant otitis externa. Patient was treated with Zosyn and Ciprodex drops to the right ear, with tylenol/toradol for pain control. After serial debridements by ENT at the bedside, patient w/ improved right-sided ear pain. ENT recommended ID consultation as patient had failed antibiotics before, who recommended 6 weeks total of Zosyn through PICC (4/16-5/28) with weekly CBC, CMP, ESR, and CRP. PICC was placed. ENT cleared the patient for discharge day prior to discharge, with follow-up to discuss biopsy results. Patient appears well on day of discharge with significantly improved pain.     Medication Changes:  - Ciprodex (Ciprofloxacin/Dexamethasone) Ear Drops 5 drops two times a day to the right ear for two weeks (4/23-5/7)  - Zosyn through PICC until 5/28 with weekly CBC/CMP/ESR/CRP    Follow-ups:  - Infectious Disease (Dr. Mariya Huffman) in <4 weeks to discuss recent hospitalization and treatment with Zosyn  - ENT (Dr. Wolfe) in <1 week to discuss recent hospitalization and follow-up biopsy results  - Primary Care Physician to discuss hospitalization and follow-up on calcium, low vitamin D levels, and history of ASCUS & HPV+ Ms. Wood is a 47-yo with hx of ASCUS w/ HPV+ (never worked-up due to insurance issues), recurrent hospitalizations for Otitis Externa 2/2 cotton swab ear cleaning that have failed oral abx, presents for continuing ear pain despite recent regimen of Ciprofloxacin for otitis externa. Pt was recently seen at [facility] for 1 week of right-sided neck pain, with CT suggesting otitis externa (possibly necrotizing) vs cholesteatoma/malignancy. Patient completed a 5-day course of ciprofloxacin, but pain worsened. Last admission was in 2019 for right otitis externa c/b mastoiditis in s/o cleaning ear with cotton swab, that had failed abx prior, debrided & treated w/ a longer course of Cipro. ED course notable for vitals afebrile, HR and BPs wnl. Labs notable for white count to 11 and hypercalcemic 11.2 w/ album 4.4. Micro notable for negative ear bacterial culture. CT Maxillofacial w/ right external otitis and mastoiditis, No abscess or intracranial extension seen. ENT was consulted, recommending MRI IAC w/ Gallium to establish diagnosis of Malignant Otitis Externa, f/u Biopsy to ruleout SCC, treat w/ Zosyn and Ciprodex drops to the right ear. Patient initially with continuing right-sided ear pain with sore throat and right-sided hearing loss, asking for pain regimen as tylenol has not worked well at home. Pain is the same as that of her prior admissions. No subjective fever, vison changes, dizziness, or focal weakness.  Denies any recent ear manipulation or water exposure, however has been cleaning her ears with cotton swabs. Regarding her hypercalcemia, she denies any polyuria or abdominal pain. No vitamins at home besides Iron, only takes BB and ACE at home for hypertension.    Patient was admitted for malignant otitis externa. MRI work-up confirmed findings c/w malignant otitis externa. Patient was treated with Zosyn and Ciprodex drops to the right ear, with tylenol/toradol for pain control. After serial debridements by ENT at the bedside, patient w/ improved right-sided ear pain. ENT recommended ID consultation as patient had failed antibiotics before, who recommended 6 weeks total of Zosyn through PICC (4/16-5/28) with weekly CBC, CMP, ESR, and CRP. PICC was placed. ENT cleared the patient for discharge day prior to discharge, with follow-up to discuss biopsy results. Patient appears well on day of discharge with significantly improved pain.     Medication Changes:  - Ciprodex (Ciprofloxacin/Dexamethasone) Ear Drops 5 drops two times a day to the right ear for two weeks (4/23-5/7)  - Zosyn through PICC until 5/28 with weekly CBC/CMP/ESR/CRP    Follow-ups:  - Infectious Disease (Dr. Mariya Huffman) in <4 weeks to discuss recent hospitalization and treatment with Zosyn  - ENT (Dr. Wolfe) in <1 week to discuss recent hospitalization and follow-up biopsy results  - Primary Care Physician to discuss hospitalization and follow-up on calcium, low vitamin D levels, and history of ASCUS & HPV+

## 2023-04-22 NOTE — DISCHARGE NOTE PROVIDER - CARE PROVIDER_API CALL
Mariya Huffman)  Internal Medicine  [address] [place] [address]  Phone: [phone]  Fax: [phone]  Follow Up Time:     Lillie Wolfe)  Otolaryngology  [address] [place] [id number]  Phone: [phone]  Fax: [phone]  Follow Up Time:

## 2023-04-22 NOTE — PROGRESS NOTE ADULT - PROBLEM SELECTOR PLAN 2
- Pt presenting initially w/ hypercalcemia to 11.2, Albumin 4.4. Corrected 10.8. Stably mildly elevated. No hx of vitamin use or medications that may cause hypercalcemia.  - Pt asymptomatic, w/o polyuria or abdominal pain.  - PTH wnl, will CTM. Low c/f true hypercalcemia. - Pt presenting initially w/ hypercalcemia to 11.2, Albumin 4.4. Corrected 10.8. Stably mildly elevated. No hx of vitamin use or medications that may cause hypercalcemia.  - Pt asymptomatic, w/o polyuria or abdominal pain.  - PTH wnl, will CTM. Low c/f true hypercalcemia.  - f/u outpatient

## 2023-04-22 NOTE — PROGRESS NOTE ADULT - ASSESSMENT
Ms. Aguilar is a 47-yo with hx of ASCUS w/ HPV+ (never worked-up due to insurance issues), prior hospitalizations for Otitis Externa that failed oral abx requiring prolonged oral abx regimens w/ most recent Otitis Externa 3/2023 treated w/ Oral Ciprofloxacin regimen, who presents for continuing right-sided ear pain, found to have persistent Otitis Externa w/ ENT evaluating, being treated w/ IV and otic abx, now cleared from ENT perspective, with plan for long-term 6 week abx per ID.

## 2023-04-22 NOTE — PROGRESS NOTE ADULT - PROBLEM SELECTOR PLAN 1
- Pt w/ recurrent episodes of Otitis Externa since 2010. Episode in 2019 was presumed to be due to cotton-swab cleaning. Otitis Externa vs malignancy vs cholesteatoma, Pain improving after ENT debridement.  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         - Ear culture results w/ cutibacterium acnes which ID believes is contaminant         - IV Zosyn (4/16-5/28), per ID will need 6 weeks total w/ PICC (Until May 28th), with weekly CBC/CMP/ESR/CRP, Follow-up w/ Dr. Huffman.          - Ciprofloxacin 0.3% ophthalmic solution in right ear 5 drops twice a day  and dexamethasone 0.1% ophthalmic solution 5 drops in right ear twice a day. On discharge, patient wll need ciprodex drops 5 drops BID for another 2 weeks.    - Toradol PRN with Gabapentin 100 mg QD  - PICC consult placed. - Pt w/ recurrent episodes of Otitis Externa since 2010. Episode in 2019 was presumed to be due to cotton-swab cleaning. Otitis Externa vs malignancy vs cholesteatoma, Pain improving after ENT debridement.  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         - Ear culture results w/ cutibacterium acnes which ID believes is contaminant         - IV Zosyn (4/16-5/28), per ID will need 6 weeks total w/ PICC (Until May 28th), with weekly CBC/CMP/ESR/CRP, Follow-up w/ Dr. Huffman.          - Ciprofloxacin 0.3% ophthalmic solution in right ear 5 drops twice a day  and dexamethasone 0.1% ophthalmic solution 5 drops in right ear twice a day. On discharge, patient wll need ciprodex drops 5 drops BID for another 2 weeks.    - Toradol PRN with Gabapentin 100 mg QD  - PICC placed, antibiotics arranged. DC today.

## 2023-04-22 NOTE — DISCHARGE NOTE PROVIDER - NSDCMRMEDTOKEN_GEN_ALL_CORE_FT
lisinopril 10 mg oral tablet: 1 tab(s) orally once a day  Metoprolol Succinate ER 50 mg oral tablet, extended release: 1 tab(s) orally once a day  Saline Flushes: 10 cc saline flushes pre and post administration of IV abx to PICC line; Diagnosis: Otitis Externa ICD H60. 90; SANTIAGO: 99  Weekly Labs: CBC, CMP, ESR, CRP every week until 5/28/2023  Zosyn: 3.375 mg every 6 hours until 5/28/2023   ciprofloxacin 0.3% ophthalmic solution: 5 application in the right ear 2 times a day Please apply 5 drops of Ciprofloxacin Solution to the Right Ear, Two times a Day  dexamethasone otic: 5 application in the right ear 2 times a day Please apply 5 drops of Dexamethasone Solution to the Right Ear, Two times a day.  lisinopril 10 mg oral tablet: 1 tab(s) orally once a day  Metoprolol Succinate ER 50 mg oral tablet, extended release: 1 tab(s) orally once a day  Saline Flushes: 10 cc saline flushes pre and post administration of IV abx to PICC line; Diagnosis: Otitis Externa ICD H60. 90; SANTIAGO: 99  Weekly Labs: CBC, CMP, ESR, CRP every week until 5/28/2023  Zosyn: 3.375 mg every 6 hours until 5/28/2023   ciprofloxacin 0.3% ophthalmic solution: 5 application in the right ear 2 times a day Please apply 5 drops of Ciprofloxacin Solution to the Right Ear, Two times a Day, from 4/23 to 5/7.  dexamethasone otic: 5 application in the right ear 2 times a day Please apply 5 drops of Dexamethasone Solution to the Right Ear, Two times a day, from 4/23 to 5/7.  lisinopril 10 mg oral tablet: 1 tab(s) orally once a day  Metoprolol Succinate ER 50 mg oral tablet, extended release: 1 tab(s) orally once a day  Saline Flushes: 10 cc saline flushes pre and post administration of IV abx to PICC line; Diagnosis: Otitis Externa ICD H60. 90; SANTIAGO: 99  Weekly Labs: CBC, CMP, ESR, CRP every week until 5/28/2023  Zosyn: 3.375 mg every 6 hours until 5/28/2023

## 2023-04-23 DIAGNOSIS — R76.12 NONSPECIFIC REACTION TO CELL MEDIATED IMMUNITY MEASUREMENT OF GAMMA INTERFERON ANTIGEN RESPONSE WITHOUT ACTIVE TUBERCULOSIS: ICD-10-CM

## 2023-04-23 PROCEDURE — 71045 X-RAY EXAM CHEST 1 VIEW: CPT | Mod: 26

## 2023-04-23 PROCEDURE — 99232 SBSQ HOSP IP/OBS MODERATE 35: CPT | Mod: GC

## 2023-04-23 RX ORDER — CIPROFLOXACIN HCL 0.3 %
5 DROPS OPHTHALMIC (EYE)
Qty: 2 | Refills: 1
Start: 2023-04-23 | End: 2023-05-20

## 2023-04-23 RX ORDER — PIPERACILLIN AND TAZOBACTAM 4; .5 G/20ML; G/20ML
3.38 INJECTION, POWDER, LYOPHILIZED, FOR SOLUTION INTRAVENOUS EVERY 8 HOURS
Refills: 0 | Status: COMPLETED | OUTPATIENT
Start: 2023-04-23 | End: 2023-04-24

## 2023-04-23 RX ORDER — KETOROLAC TROMETHAMINE 30 MG/ML
15 SYRINGE (ML) INJECTION ONCE
Refills: 0 | Status: DISCONTINUED | OUTPATIENT
Start: 2023-04-23 | End: 2023-04-23

## 2023-04-23 RX ORDER — CIPROFLOXACIN HCL 0.3 %
5 DROPS OPHTHALMIC (EYE)
Qty: 0 | Refills: 0 | DISCHARGE
Start: 2023-04-23 | End: 2023-05-07

## 2023-04-23 RX ORDER — KETOROLAC TROMETHAMINE 30 MG/ML
15 SYRINGE (ML) INJECTION EVERY 8 HOURS
Refills: 0 | Status: DISCONTINUED | OUTPATIENT
Start: 2023-04-23 | End: 2023-04-24

## 2023-04-23 RX ADMIN — Medication 650 MILLIGRAM(S): at 21:26

## 2023-04-23 RX ADMIN — Medication 650 MILLIGRAM(S): at 11:55

## 2023-04-23 RX ADMIN — Medication 15 MILLIGRAM(S): at 14:53

## 2023-04-23 RX ADMIN — CHLORHEXIDINE GLUCONATE 1 APPLICATION(S): 213 SOLUTION TOPICAL at 11:04

## 2023-04-23 RX ADMIN — Medication 15 MILLIGRAM(S): at 02:20

## 2023-04-23 RX ADMIN — Medication 650 MILLIGRAM(S): at 22:26

## 2023-04-23 RX ADMIN — Medication 5 DROP(S): at 05:32

## 2023-04-23 RX ADMIN — PIPERACILLIN AND TAZOBACTAM 25 GRAM(S): 4; .5 INJECTION, POWDER, LYOPHILIZED, FOR SOLUTION INTRAVENOUS at 18:26

## 2023-04-23 RX ADMIN — Medication 650 MILLIGRAM(S): at 10:56

## 2023-04-23 RX ADMIN — GABAPENTIN 100 MILLIGRAM(S): 400 CAPSULE ORAL at 11:04

## 2023-04-23 RX ADMIN — Medication 650 MILLIGRAM(S): at 00:15

## 2023-04-23 RX ADMIN — PIPERACILLIN AND TAZOBACTAM 25 GRAM(S): 4; .5 INJECTION, POWDER, LYOPHILIZED, FOR SOLUTION INTRAVENOUS at 10:56

## 2023-04-23 RX ADMIN — PIPERACILLIN AND TAZOBACTAM 25 GRAM(S): 4; .5 INJECTION, POWDER, LYOPHILIZED, FOR SOLUTION INTRAVENOUS at 02:01

## 2023-04-23 RX ADMIN — Medication 15 MILLIGRAM(S): at 15:05

## 2023-04-23 RX ADMIN — Medication 5 DROP(S): at 18:30

## 2023-04-23 RX ADMIN — Medication 15 MILLIGRAM(S): at 02:01

## 2023-04-23 NOTE — PROGRESS NOTE ADULT - SUBJECTIVE AND OBJECTIVE BOX
Patient is a 47y old  Female who presents with a chief complaint of Otitis Externa (23 Apr 2023 07:08)      SUBJECTIVE / OVERNIGHT EVENTS:          MEDICATIONS  (STANDING):  chlorhexidine 2% Cloths 1 Application(s) Topical daily  ciprofloxacin  0.3% Ophthalmic Solution for Otic Use 5 Drop(s) Right Ear two times a day  dexAMETHasone 0.1% Ophthalmic Solution for OTIC Use 5 Drop(s) Right Ear two times a day  gabapentin 100 milliGRAM(s) Oral daily  piperacillin/tazobactam IVPB.. 3.375 Gram(s) IV Intermittent every 8 hours    MEDICATIONS  (PRN):  acetaminophen     Tablet .. 650 milliGRAM(s) Oral every 6 hours PRN Temp greater or equal to 38C (100.4F), Mild Pain (1 - 3)  aluminum hydroxide/magnesium hydroxide/simethicone Suspension 30 milliLiter(s) Oral every 4 hours PRN Dyspepsia  melatonin 3 milliGRAM(s) Oral at bedtime PRN Insomnia      Vital Signs Last 24 Hrs  T(C): 36.5 (23 Apr 2023 05:00), Max: 37.3 (22 Apr 2023 21:22)  T(F): 97.7 (23 Apr 2023 05:00), Max: 99.1 (22 Apr 2023 21:22)  HR: 92 (23 Apr 2023 05:00) (77 - 92)  BP: 122/79 (23 Apr 2023 05:00) (122/79 - 133/94)  BP(mean): --  RR: 18 (23 Apr 2023 05:00) (18 - 18)  SpO2: 100% (23 Apr 2023 05:00) (100% - 100%)    Parameters below as of 23 Apr 2023 05:00  Patient On (Oxygen Delivery Method): room air          PHYSICAL EXAM  GENERAL: NAD, well-developed  HEAD:  Atraumatic, Normocephalic  EYES: EOMI, PERRLA, conjunctiva and sclera clear  NECK: Supple, No JVD  CHEST/LUNG: Clear to auscultation bilaterally; No wheeze  HEART: Regular rate and rhythm; No murmurs, rubs, or gallops  ABDOMEN: Soft, Nontender, Nondistended; Bowel sounds present  EXTREMITIES:  2+ Peripheral Pulses, No clubbing, cyanosis, or edema  PSYCH: AAOx3  SKIN: No rashes or lesions    CAPILLARY BLOOD GLUCOSE        I&O's Summary      LABS:                    RADIOLOGY & ADDITIONAL TESTS:     MICROBIOLOGY:    ANTIMICROBIALS:    CONSULTS: Patient is a 47y old  Female who presents with a chief complaint of Otitis Externa (23 Apr 2023 07:08)      SUBJECTIVE / OVERNIGHT EVENTS:    Overnight patient required 1x PRN of toradol for right ear pain. This morning feeling improved. Had positive quantiferon resulted overnight, denies any hemoptysis, cough, weight loss or other symptoms.       MEDICATIONS  (STANDING):  chlorhexidine 2% Cloths 1 Application(s) Topical daily  ciprofloxacin  0.3% Ophthalmic Solution for Otic Use 5 Drop(s) Right Ear two times a day  dexAMETHasone 0.1% Ophthalmic Solution for OTIC Use 5 Drop(s) Right Ear two times a day  gabapentin 100 milliGRAM(s) Oral daily  piperacillin/tazobactam IVPB.. 3.375 Gram(s) IV Intermittent every 8 hours    MEDICATIONS  (PRN):  acetaminophen     Tablet .. 650 milliGRAM(s) Oral every 6 hours PRN Temp greater or equal to 38C (100.4F), Mild Pain (1 - 3)  aluminum hydroxide/magnesium hydroxide/simethicone Suspension 30 milliLiter(s) Oral every 4 hours PRN Dyspepsia  melatonin 3 milliGRAM(s) Oral at bedtime PRN Insomnia      Vital Signs Last 24 Hrs  T(C): 36.5 (23 Apr 2023 05:00), Max: 37.3 (22 Apr 2023 21:22)  T(F): 97.7 (23 Apr 2023 05:00), Max: 99.1 (22 Apr 2023 21:22)  HR: 92 (23 Apr 2023 05:00) (77 - 92)  BP: 122/79 (23 Apr 2023 05:00) (122/79 - 133/94)  BP(mean): --  RR: 18 (23 Apr 2023 05:00) (18 - 18)  SpO2: 100% (23 Apr 2023 05:00) (100% - 100%)    Parameters below as of 23 Apr 2023 05:00  Patient On (Oxygen Delivery Method): room air          PHYSICAL EXAM  GENERAL: NAD  HEAD: No gross drainage or deformity of the right ear. No erythema or gross swelling.   EYES: EOMI, conjunctiva and sclera clear  CHEST/LUNG: Clear to auscultation bilaterally; No rales, rhonchi, wheezing, or rubs  HEART: Regular rate and rhythm; No murmurs, rubs, or gallops  ABDOMEN: Soft, Nontender, Nondistended;   SKIN: No rashes or lesions  NERVOUS SYSTEM:  Alert & Oriented X3, no focal deficits  CAPILLARY BLOOD GLUCOSE        I&O's Summary      LABS:                    RADIOLOGY & ADDITIONAL TESTS:     MICROBIOLOGY:    ANTIMICROBIALS:    CONSULTS:

## 2023-04-23 NOTE — PROGRESS NOTE ADULT - SUBJECTIVE AND OBJECTIVE BOX
ENT ISSUE/POD: right otitis externa s/p polyp tissue biopsy    HPI: Patient seen and examined at bedside. No acute event overnight, on IV zosyn since 4/16/2023. Culture shows moderate cutibacterium acnes, pathology pending. Endorsing pain in right ear        PAST MEDICAL & SURGICAL HISTORY:  Otitis externa  (with evidence of otomastoiditis on CT)      Hypertension      No significant past surgical history        Allergies    No Known Allergies    Intolerances      MEDICATIONS  (STANDING):  ciprofloxacin  0.3% Ophthalmic Solution for Otic Use 5 Drop(s) Right Ear two times a day  dexAMETHasone 0.1% Ophthalmic Solution for OTIC Use 5 Drop(s) Right Ear two times a day  gabapentin 100 milliGRAM(s) Oral daily  piperacillin/tazobactam IVPB.. 3.375 Gram(s) IV Intermittent every 8 hours    MEDICATIONS  (PRN):  acetaminophen     Tablet .. 650 milliGRAM(s) Oral every 6 hours PRN Temp greater or equal to 38C (100.4F), Mild Pain (1 - 3)  aluminum hydroxide/magnesium hydroxide/simethicone Suspension 30 milliLiter(s) Oral every 4 hours PRN Dyspepsia  ketorolac   Injectable 15 milliGRAM(s) IV Push every 6 hours PRN Severe Pain (7 - 10)  melatonin 3 milliGRAM(s) Oral at bedtime PRN Insomnia      Social History: see consult note    Family history: see consult note    ROS:   ENT: all negative except as noted in HPI         Vital Signs Last 24 Hrs  T(C): 37.1 (21 Apr 2023 05:51), Max: 37.1 (21 Apr 2023 05:51)  T(F): 98.8 (21 Apr 2023 05:51), Max: 98.8 (21 Apr 2023 05:51)  HR: 79 (21 Apr 2023 05:51) (79 - 86)  BP: 129/66 (21 Apr 2023 05:51) (123/80 - 139/83)  BP(mean): --  RR: 17 (21 Apr 2023 05:51) (17 - 18)  SpO2: 100% (21 Apr 2023 05:51) (100% - 100%)    Parameters below as of 21 Apr 2023 05:51  Patient On (Oxygen Delivery Method): room air                              12.1   6.79  )-----------( 187      ( 21 Apr 2023 05:15 )             40.4    04-21    137  |  103  |  12  ----------------------------<  80  4.3   |  23  |  0.68    Ca    10.1      21 Apr 2023 05:15  Phos  3.4     04-21  Mg     2.20     04-21    TPro  6.9  /  Alb  3.8  /  TBili  0.2  /  DBili  x   /  AST  16  /  ALT  14  /  AlkPhos  54  04-21       PHYSICAL EXAM:  Gen: NAD  Skin: No rashes, bruises, or lesions  Head: Normocephalic, Atraumatic  Face: no edema, erythema, or fluctuance. Parotid glands soft without mass  Eyes: no scleral injection  Ears:   Right - external ear canal filled with wet debris, suctioned at bedside. Improved appearance of the external ear canal. There is a polyp in the anterior ear canal wall. Unable to visualize entire TM due to debris. No mastoid tenderness, erythema, or ear bulging  Left - ear canal clear, TM intact without effusion or erythema. No evidence of any fluid drainage. No mastoid tenderness, erythema, or ear bulging  Nose: Nares bilaterally patent, no dischargMouth: No Stridor / Drooling / Trismus.  Mucosa moist, tongue/uvula midline, oropharynx clear  Neck: Flat, supple, no lymphadenopathy, trachea midline, no masses  Neuro: facial nerve intact, no facial droop

## 2023-04-23 NOTE — PROGRESS NOTE ADULT - SUBJECTIVE AND OBJECTIVE BOX
ENT ISSUE/POD: right otitis externa s/p polyp tissue biopsy    on IV zosyn since 4/16/2023. Culture shows moderate cutibacterium acnes, pathology pending. Endorsing pain in right ear    Interval:  Right ear debrided at bedside. Continued inflammatory debris, improved. No wick in place        PAST MEDICAL & SURGICAL HISTORY:  Otitis externa  (with evidence of otomastoiditis on CT)      Hypertension      No significant past surgical history        Allergies    No Known Allergies    Intolerances      MEDICATIONS  (STANDING):  ciprofloxacin  0.3% Ophthalmic Solution for Otic Use 5 Drop(s) Right Ear two times a day  dexAMETHasone 0.1% Ophthalmic Solution for OTIC Use 5 Drop(s) Right Ear two times a day  gabapentin 100 milliGRAM(s) Oral daily  piperacillin/tazobactam IVPB.. 3.375 Gram(s) IV Intermittent every 8 hours    MEDICATIONS  (PRN):  acetaminophen     Tablet .. 650 milliGRAM(s) Oral every 6 hours PRN Temp greater or equal to 38C (100.4F), Mild Pain (1 - 3)  aluminum hydroxide/magnesium hydroxide/simethicone Suspension 30 milliLiter(s) Oral every 4 hours PRN Dyspepsia  ketorolac   Injectable 15 milliGRAM(s) IV Push every 6 hours PRN Severe Pain (7 - 10)  melatonin 3 milliGRAM(s) Oral at bedtime PRN Insomnia      Social History: see consult note    Family history: see consult note    ROS:   ENT: all negative except as noted in HPI       ICU Vital Signs Last 24 Hrs  T(C): 36.7 (23 Apr 2023 14:00), Max: 37.3 (22 Apr 2023 21:22)  T(F): 98 (23 Apr 2023 14:00), Max: 99.1 (22 Apr 2023 21:22)  HR: 86 (23 Apr 2023 14:00) (86 - 92)  BP: 124/82 (23 Apr 2023 14:00) (122/79 - 126/85)  BP(mean): --  ABP: --  ABP(mean): --  RR: 18 (23 Apr 2023 14:00) (18 - 18)  SpO2: 100% (23 Apr 2023 14:00) (100% - 100%)    O2 Parameters below as of 23 Apr 2023 14:00  Patient On (Oxygen Delivery Method): room air    PHYSICAL EXAM:  Gen: NAD  Skin: No rashes, bruises, or lesions  Head: Normocephalic, Atraumatic  Face: no edema, erythema, or fluctuance. Parotid glands soft without mass  Eyes: no scleral injection  Ears:   Right - external ear canal filled with wet debris, suctioned at bedside. Improved appearance of the external ear canal. There is a polyp in the anterior ear canal wall. Unable to visualize entire TM due to debris. No mastoid tenderness, erythema, or ear bulging  Left - ear canal clear, TM intact without effusion or erythema. No evidence of any fluid drainage. No mastoid tenderness, erythema, or ear bulging  Nose: Nares bilaterally patent, no dischargMouth: No Stridor / Drooling / Trismus.  Mucosa moist, tongue/uvula midline, oropharynx clear  Neck: Flat, supple, no lymphadenopathy, trachea midline, no masses  Neuro: facial nerve intact, no facial droop

## 2023-04-23 NOTE — PROGRESS NOTE ADULT - PROBLEM SELECTOR PLAN 1
- Pt w/ recurrent episodes of Otitis Externa since 2010. Episode in 2019 was presumed to be due to cotton-swab cleaning. Otitis Externa vs malignancy vs cholesteatoma, Pain improving after ENT debridement.  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         - Ear culture results w/ cutibacterium acnes which ID believes is contaminant         - IV Zosyn (4/16-5/28), per ID will need 6 weeks total w/ PICC (Until May 28th), with weekly CBC/CMP/ESR/CRP, Follow-up w/ Dr. Huffman.          - Ciprofloxacin 0.3% ophthalmic solution in right ear 5 drops twice a day  and dexamethasone 0.1% ophthalmic solution 5 drops in right ear twice a day. On discharge, patient wll need ciprodex drops 5 drops BID for another 2 weeks.    - Toradol PRN with Gabapentin 100 mg QD  - PICC placed, antibiotics arranged. DC today.

## 2023-04-23 NOTE — PROGRESS NOTE ADULT - PROBLEM SELECTOR PLAN 1
- Overall improving based on physical exam, more debridement done at bedside, patient tolerated well.  - f/u biopsy of tissue from right ear canal rule out SCC, primary team will call pathology today for report follow up  - continue ciprofloxacin 0.3% ophthalmic solution 5 drops twice a day  and dexamethasone 0.1% ophthalmic solution 5 drops  twice a day to right ear, please discharge patient with ciprodex drops 5 drops BID for another 2 weeks on discharge when medically cleared from primary team  - Continue IV zosyn per ID  - Trending WBC and fever  - ENT will continue to follow daily for ear debridement while patient is still admitted.  - Case discussed with Dr. Patino and senior resident  - Patient can be discharged from ENT perspective once ID establish antibiotic regiment and duration.   - Please have patient follow up outpatient with Dr. Wolfe in 1 week of discharge, please call [phone] for an appointment.

## 2023-04-23 NOTE — PROGRESS NOTE ADULT - PROBLEM SELECTOR PLAN 2
- Pt presenting initially w/ hypercalcemia to 11.2, Albumin 4.4. Corrected 10.8. Stably mildly elevated. No hx of vitamin use or medications that may cause hypercalcemia.  - Pt asymptomatic, w/o polyuria or abdominal pain.  - PTH wnl, will CTM. Low c/f true hypercalcemia.  - f/u outpatient

## 2023-04-23 NOTE — PROGRESS NOTE ADULT - PROBLEM SELECTOR PLAN 5
DVT PPx: SCDs  Diet: Regular, adjust as needed. a1c normal.   Bowel Regimen: As needed  Code: Full  Dispo: Pt functional, no PT/OT, Home.   Pharmacy:  PCP:   Communication: F/u CXR  Otherwise asymptomatic  Will have to get collateral on if patient previously treated for latent TB  Previously received BCG vaccine should not interfere with quantiferon gold test however.

## 2023-04-23 NOTE — PROVIDER CONTACT NOTE (OTHER) - BACKGROUND
Pt denies respiratory distress, weight loss, night sweats. Denies contact w/ TB positive person. Received TB vaccination in the past in her home country of [place].

## 2023-04-23 NOTE — PROGRESS NOTE ADULT - ASSESSMENT
47F PMH HTN p/w right ear pain, intially consulted 4/7 for R ear pain and dc on PO cipro. Now presenting with worsening pain. R EAC improved from yesterday, more debridement done at bedside, still unable to visualize TM. R ear cultures previously taken shows   Exam and story consistent with otitis externa with polyp, less likely as malignant otitis externa. Tissue biopsy done previously to r/o squamous cell carcinoma of the ear. Result pending  MRI shows re-demonstration of right-sided malignant otitis externa and acute right-sided otomastoiditis.     - Continue IV abx   - Continue ciprodex gtt   - ENT will debride daily

## 2023-04-23 NOTE — PROGRESS NOTE ADULT - PROBLEM SELECTOR PLAN 1
- Overall improving based on physical exam, more debridement done at bedside, patient tolerated well.  - f/u biopsy of tissue from right ear canal rule out SCC, primary team will call pathology today for report follow up  - continue ciprofloxacin 0.3% ophthalmic solution 5 drops twice a day  and dexamethasone 0.1% ophthalmic solution 5 drops  twice a day to right ear, please discharge patient with ciprodex drops 5 drops BID for another 2 weeks on discharge when medically cleared from primary team  - Continue IV zosyn per ID  - Trending WBC and fever  - ENT will continue to follow daily for ear debridement while patient is still admitted.  - Case discussed with Dr. Patino and senior resident  - Patient can be discharged from ENT perspective once ID establish antibiotic regiment and duration.   - Please have patient follow up outpatient with Dr. Wolfe in 1 week of discharge, please call [phone] for an appointment.

## 2023-04-24 LAB
ANION GAP SERPL CALC-SCNC: 11 MMOL/L — SIGNIFICANT CHANGE UP (ref 7–14)
BUN SERPL-MCNC: 17 MG/DL — SIGNIFICANT CHANGE UP (ref 7–23)
CALCIUM SERPL-MCNC: 10.4 MG/DL — SIGNIFICANT CHANGE UP (ref 8.4–10.5)
CHLORIDE SERPL-SCNC: 104 MMOL/L — SIGNIFICANT CHANGE UP (ref 98–107)
CO2 SERPL-SCNC: 22 MMOL/L — SIGNIFICANT CHANGE UP (ref 22–31)
CREAT SERPL-MCNC: 0.68 MG/DL — SIGNIFICANT CHANGE UP (ref 0.5–1.3)
EGFR: 108 ML/MIN/1.73M2 — SIGNIFICANT CHANGE UP
GLUCOSE SERPL-MCNC: 85 MG/DL — SIGNIFICANT CHANGE UP (ref 70–99)
HCT VFR BLD CALC: 38.3 % — SIGNIFICANT CHANGE UP (ref 34.5–45)
HGB BLD-MCNC: 11.8 G/DL — SIGNIFICANT CHANGE UP (ref 11.5–15.5)
MAGNESIUM SERPL-MCNC: 2 MG/DL — SIGNIFICANT CHANGE UP (ref 1.6–2.6)
MCHC RBC-ENTMCNC: 25.5 PG — LOW (ref 27–34)
MCHC RBC-ENTMCNC: 30.8 GM/DL — LOW (ref 32–36)
MCV RBC AUTO: 82.7 FL — SIGNIFICANT CHANGE UP (ref 80–100)
NRBC # BLD: 0 /100 WBCS — SIGNIFICANT CHANGE UP (ref 0–0)
NRBC # FLD: 0 K/UL — SIGNIFICANT CHANGE UP (ref 0–0)
PHOSPHATE SERPL-MCNC: 3.2 MG/DL — SIGNIFICANT CHANGE UP (ref 2.5–4.5)
PLATELET # BLD AUTO: 195 K/UL — SIGNIFICANT CHANGE UP (ref 150–400)
POTASSIUM SERPL-MCNC: 4.4 MMOL/L — SIGNIFICANT CHANGE UP (ref 3.5–5.3)
POTASSIUM SERPL-SCNC: 4.4 MMOL/L — SIGNIFICANT CHANGE UP (ref 3.5–5.3)
RBC # BLD: 4.63 M/UL — SIGNIFICANT CHANGE UP (ref 3.8–5.2)
RBC # FLD: 21.7 % — HIGH (ref 10.3–14.5)
SODIUM SERPL-SCNC: 137 MMOL/L — SIGNIFICANT CHANGE UP (ref 135–145)
WBC # BLD: 6.69 K/UL — SIGNIFICANT CHANGE UP (ref 3.8–10.5)
WBC # FLD AUTO: 6.69 K/UL — SIGNIFICANT CHANGE UP (ref 3.8–10.5)

## 2023-04-24 PROCEDURE — 99232 SBSQ HOSP IP/OBS MODERATE 35: CPT

## 2023-04-24 PROCEDURE — 99232 SBSQ HOSP IP/OBS MODERATE 35: CPT | Mod: GC

## 2023-04-24 RX ORDER — PIPERACILLIN AND TAZOBACTAM 4; .5 G/20ML; G/20ML
3.38 INJECTION, POWDER, LYOPHILIZED, FOR SOLUTION INTRAVENOUS
Refills: 0 | Status: DISCONTINUED | OUTPATIENT
Start: 2023-04-25 | End: 2023-04-25

## 2023-04-24 RX ADMIN — Medication 15 MILLIGRAM(S): at 02:00

## 2023-04-24 RX ADMIN — Medication 650 MILLIGRAM(S): at 21:54

## 2023-04-24 RX ADMIN — CHLORHEXIDINE GLUCONATE 1 APPLICATION(S): 213 SOLUTION TOPICAL at 11:42

## 2023-04-24 RX ADMIN — PIPERACILLIN AND TAZOBACTAM 25 GRAM(S): 4; .5 INJECTION, POWDER, LYOPHILIZED, FOR SOLUTION INTRAVENOUS at 03:33

## 2023-04-24 RX ADMIN — Medication 15 MILLIGRAM(S): at 00:01

## 2023-04-24 RX ADMIN — PIPERACILLIN AND TAZOBACTAM 25 GRAM(S): 4; .5 INJECTION, POWDER, LYOPHILIZED, FOR SOLUTION INTRAVENOUS at 18:28

## 2023-04-24 RX ADMIN — Medication 650 MILLIGRAM(S): at 22:40

## 2023-04-24 RX ADMIN — Medication 5 DROP(S): at 05:13

## 2023-04-24 RX ADMIN — GABAPENTIN 100 MILLIGRAM(S): 400 CAPSULE ORAL at 11:42

## 2023-04-24 RX ADMIN — Medication 15 MILLIGRAM(S): at 09:09

## 2023-04-24 RX ADMIN — Medication 650 MILLIGRAM(S): at 16:45

## 2023-04-24 RX ADMIN — Medication 15 MILLIGRAM(S): at 09:20

## 2023-04-24 RX ADMIN — Medication 5 DROP(S): at 18:30

## 2023-04-24 RX ADMIN — PIPERACILLIN AND TAZOBACTAM 25 GRAM(S): 4; .5 INJECTION, POWDER, LYOPHILIZED, FOR SOLUTION INTRAVENOUS at 11:42

## 2023-04-24 RX ADMIN — Medication 650 MILLIGRAM(S): at 15:43

## 2023-04-24 NOTE — PROGRESS NOTE ADULT - PROBLEM SELECTOR PLAN 1
- Pt w/ recurrent episodes of Otitis Externa since 2010. Episode in 2019 was presumed to be due to cotton-swab cleaning. Otitis Externa vs malignancy vs cholesteatoma, Pain improving after ENT debridement.  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         - Ear culture results w/ cutibacterium acnes which ID believes is contaminant         - IV Zosyn (4/16-5/28), per ID will need 6 weeks total w/ PICC (Until May 28th), with weekly CBC/CMP/ESR/CRP, Follow-up w/ Dr. Huffman.          - Ciprofloxacin 0.3% ophthalmic solution in right ear 5 drops twice a day  and dexamethasone 0.1% ophthalmic solution 5 drops in right ear twice a day. On discharge, patient wll need ciprodex drops 5 drops BID for another 2 weeks.    - Toradol PRN with Gabapentin 100 mg QD  - PICC placed, antibiotics arranged. DC today. - Pt w/ recurrent episodes of Otitis Externa since 2010. Episode in 2019 was presumed to be due to cotton-swab cleaning. Otitis Externa vs malignancy vs cholesteatoma, Pain improving after ENT debridement.  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         - Ear culture results w/ cutibacterium acnes which ID believes is contaminant         - IV Zosyn q6 (4/16-5/28), per ID will need 6 weeks total w/ PICC (Until May 28th), with weekly CBC/CMP/ESR/CRP, Follow-up w/ Dr. Huffman.          - Ciprofloxacin 0.3% ophthalmic solution in right ear 5 drops twice a day  and dexamethasone 0.1% ophthalmic solution 5 drops in right ear twice a day. On discharge, patient wll need ciprodex drops 5 drops BID for another 2 weeks.    - Gabapentin 100 mg QD  - PICC placed, antibiotics arranged. DC 4/25

## 2023-04-24 NOTE — PROGRESS NOTE ADULT - SUBJECTIVE AND OBJECTIVE BOX
ENT ISSUE/POD: right otitis externa s/p polyp tissue biopsy    on IV zosyn since 4/16/2023. Culture shows moderate cutibacterium acnes, pathology pending. Endorsing pain in right ear    Interval:  Right ear debrided at bedside. Continued inflammatory debris. No wick in place        PAST MEDICAL & SURGICAL HISTORY:  Otitis externa  (with evidence of otomastoiditis on CT)      Hypertension      No significant past surgical history        Allergies    No Known Allergies    Intolerances      MEDICATIONS  (STANDING):  ciprofloxacin  0.3% Ophthalmic Solution for Otic Use 5 Drop(s) Right Ear two times a day  dexAMETHasone 0.1% Ophthalmic Solution for OTIC Use 5 Drop(s) Right Ear two times a day  gabapentin 100 milliGRAM(s) Oral daily  piperacillin/tazobactam IVPB.. 3.375 Gram(s) IV Intermittent every 8 hours    MEDICATIONS  (PRN):  acetaminophen     Tablet .. 650 milliGRAM(s) Oral every 6 hours PRN Temp greater or equal to 38C (100.4F), Mild Pain (1 - 3)  aluminum hydroxide/magnesium hydroxide/simethicone Suspension 30 milliLiter(s) Oral every 4 hours PRN Dyspepsia  ketorolac   Injectable 15 milliGRAM(s) IV Push every 6 hours PRN Severe Pain (7 - 10)  melatonin 3 milliGRAM(s) Oral at bedtime PRN Insomnia      Social History: see consult note    Family history: see consult note    ROS:   ENT: all negative except as noted in HPI       ICU Vital Signs Last 24 Hrs  T(C): 36.7 (23 Apr 2023 14:00), Max: 37.3 (22 Apr 2023 21:22)  T(F): 98 (23 Apr 2023 14:00), Max: 99.1 (22 Apr 2023 21:22)  HR: 86 (23 Apr 2023 14:00) (86 - 92)  BP: 124/82 (23 Apr 2023 14:00) (122/79 - 126/85)  BP(mean): --  ABP: --  ABP(mean): --  RR: 18 (23 Apr 2023 14:00) (18 - 18)  SpO2: 100% (23 Apr 2023 14:00) (100% - 100%)    O2 Parameters below as of 23 Apr 2023 14:00  Patient On (Oxygen Delivery Method): room air    PHYSICAL EXAM:  Gen: NAD  Skin: No rashes, bruises, or lesions  Head: Normocephalic, Atraumatic  Face: no edema, erythema, or fluctuance. Parotid glands soft without mass  Eyes: no scleral injection  Ears:   Right - external ear canal filled with wet debris, suctioned at bedside. Improved appearance of the external ear canal. There is a polyp in the anterior ear canal wall. Unable to visualize entire TM due to debris. No mastoid tenderness, erythema, or ear bulging  Left - ear canal clear, TM intact without effusion or erythema. No evidence of any fluid drainage. No mastoid tenderness, erythema, or ear bulging  Nose: Nares bilaterally patent, no dischargMouth: No Stridor / Drooling / Trismus.  Mucosa moist, tongue/uvula midline, oropharynx clear  Neck: Flat, supple, no lymphadenopathy, trachea midline, no masses  Neuro: facial nerve intact, no facial droop

## 2023-04-24 NOTE — PROGRESS NOTE ADULT - SUBJECTIVE AND OBJECTIVE BOX
Follow Up: otitis externa     Interval History/ROS: no fever, pt feels much better improved pain and edema of the R ear, no vomiting or cough, it was debrided again by ENT but no cx sent, path still in progress, quantiferon positive            Allergies  No Known Allergies        ANTIMICROBIALS:  piperacillin/tazobactam IVPB.. 3.375 every 8 hours      OTHER MEDS:  acetaminophen     Tablet .. 650 milliGRAM(s) Oral every 6 hours PRN  aluminum hydroxide/magnesium hydroxide/simethicone Suspension 30 milliLiter(s) Oral every 4 hours PRN  chlorhexidine 2% Cloths 1 Application(s) Topical daily  ciprofloxacin  0.3% Ophthalmic Solution for Otic Use 5 Drop(s) Right Ear two times a day  dexAMETHasone 0.1% Ophthalmic Solution for OTIC Use 5 Drop(s) Right Ear two times a day  gabapentin 100 milliGRAM(s) Oral daily  ketorolac   Injectable 15 milliGRAM(s) IV Push every 8 hours PRN  melatonin 3 milliGRAM(s) Oral at bedtime PRN      Vital Signs Last 24 Hrs  T(C): 36.6 (24 Apr 2023 05:10), Max: 36.9 (23 Apr 2023 21:10)  T(F): 97.9 (24 Apr 2023 05:10), Max: 98.5 (23 Apr 2023 21:10)  HR: 83 (24 Apr 2023 05:10) (83 - 86)  BP: 140/80 (24 Apr 2023 05:10) (123/65 - 140/80)  BP(mean): --  RR: 18 (24 Apr 2023 05:10) (18 - 18)  SpO2: 100% (24 Apr 2023 05:10) (100% - 100%)    Parameters below as of 24 Apr 2023 05:10  Patient On (Oxygen Delivery Method): room air        Physical Exam:  General:    NAD, non toxic  ENT:   improved R ear edema, tenderness   Cardio:    regular S1,S2  Respiratory:   clear b/l, no wheezing  abd:   soft, BS +, not tender  :     no CVAT, no suprapubic tenderness, no sow  Musculoskeletal : no joint swelling, no edema  Skin:    no rash  vascular: RUE picc                        11.8   6.69  )-----------( 195      ( 24 Apr 2023 06:09 )             38.3       04-24    137  |  104  |  17  ----------------------------<  85  4.4   |  22  |  0.68    Ca    10.4      24 Apr 2023 06:09  Phos  3.2     04-24  Mg     2.00     04-24            MICROBIOLOGY:  v  Ear Ear  04-16-23   Moderate Cutibacterium acnes "Susceptibilities not performed"  Normal skin eve isolated  --    No polymorphonuclear leukocytes per low power field  No organisms seen per oil power field                RADIOLOGY:  Images independently visualized and reviewed personally, findings as below  < from: Xray Chest 1 View AP/PA (04.23.23 @ 13:41) >  IMPRESSION:  No active pulmonary disease.      < end of copied text >  < from: MR IAC w/ IV Cont (04.18.23 @ 20:03) >  IMPRESSION: Redemonstration of right-sided malignant otitis externa and   acute right-sided otomastoiditis.    No acute intracranial hemorrhage, acute ischemia, or abnormal   intracranial enhancement.    < end of copied text >

## 2023-04-24 NOTE — PROGRESS NOTE ADULT - ASSESSMENT
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 Osseous erosion is aggressive feature suggesting active disease process, favor infection versus cholesteatoma, more likely than tumor. No abscess collection is identified. No intracranial extension is identified .  ENT placed a wick and cx negative, their differentials are malignant otitis externa vs infected cholesteatoma (less likely) vs squamous cell carcinoma of the ear (also less likely)  MRI: Redemonstration of right-sided malignant otitis externa and acute right-sided otomastoiditis. No acute intracranial hemorrhage, acute ischemia, or abnormal intracranial enhancement.    recurrent R otitis externa now again with soft tissue edema, drainage, LAD and sánchez erosions but did not improve on cipro, no fever, WBC, CX negative with hypercalcemia which makes malignancy or a granulomatous infection more likely  quantiferon also positive will need the biopsy result, for now pt improving on zosyn  * f/u the tissue path if it is s/o necrotizing granduloma then TB is in differneitals for now will wait if no evidence of active TB, pt needs to be treated for latent TB  * there is a biopsy from a polyp  in progress but if not helpful I believe will need a tissue biopsy and debridement for better diagnosis, she did not improve on cipro either   * c/w zosyn for now, started 4/16 now day 9  * pt is clinically better and no cx data, (cx with C. acne, which is likely skin contaminant), failed cipro so would continue with zosnn to complete a 6 week course, the home dose will be q6  * f/u with ENT  * weekly CBC, CMP, ESR, CRP while on antibiotics  * f/u with ID with in 4 weeks    The above assessment and plan was discussed with the primary team    Mariya Huffman MD  contact on teams  After 5pm and on weekends call [phone]

## 2023-04-24 NOTE — PROGRESS NOTE ADULT - ATTENDING COMMENTS
47 year old female with history of HPV/ASCUS, HTN, recurrent ear infections/pain, now presents with progressive R ear and facial pain s/p ENT evaluation and culture/biopsy.     Continued facial/ear pain, reports exacerbated by recent ear cleaning by ENT this AM. Afebrile, no fever, chills. Appreciate ENT input, c/w zosyn, ciprodex drops, follow up MRI (Pending) and biopsy results. Will need outpatient follow up for HPV/ASCUS history. D/w HS team. Remainder as above.
47 year old female with history of HPV/ASCUS, HTN, recurrent ear infections/pain, now presents with progressive R ear and facial pain s/p ENT evaluation and culture/biopsy.     No acute overnight events. Pain slightly improved today. Afebrile. Appreciate ENT and ID input. Still pending biopsy results. C/w zosyn, ciprodex. MRI with redemonstration of right-sided malignant otitis externa and acute right-sided otomastoiditis. HIV neg. F/u additional ENT recs. Will need outpatient follow up for HPV/ASCUS history. D/w HS team. Remainder as above.
47 year old female with history of HPV/ASCUS, HTN, recurrent ear infections/pain, now presents with progressive R ear and facial pain s/p ENT evaluation and culture/biopsy.     No acute overnight events. Pain less severe today per patient, no subjective fever/ chills. Afebrile. Appreciate ENT and ID input. Plan for 6 weeks of IV abx with cipro per ID, pending PICC placement and home antibiotic set up. Outpatient ENT follow up for review of biopsy results once they return. Will also need outpatient follow up for HPV/ASCUS history. D/w HS team. Remainder as above.
47 year old female with history of HPV/ASCUS, HTN, recurrent ear infections/pain, now presents with progressive R ear and facial pain s/p ENT evaluation and culture/biopsy.  Afebrile. Appreciate ENT and ID input. Plan for 6 weeks of IV abx with cipro per ID, s/p PICC placement 4/21. Outpatient ENT follow up for review of biopsy results once they return. Will also need outpatient follow up for HPV/ASCUS history. Discharge home today. D/w HS team. Remainder as above.
47 year old female with history of HPV/ASCUS, HTN, recurrent ear infections/pain, now presents with progressive R ear and facial pain s/p ENT evaluation and culture/biopsy. This AM, continued facial discomfort, reports improvement after receiving toradol this AM, currently tolerable. No current nausea, vomiting, fever, chills, bleeding, visual changes. Appreciate ENT recs, c/w zosyn, ciprodex drops, follow up MRI (Pending) and biopsy results. Will need outpatient follow up for HPV/ASCUS history. D/w HS team. Remainder as above.
47 year old female with history of HPV/ASCUS, HTN, recurrent ear infections/pain, now presents with progressive R ear and facial pain s/p ENT evaluation and culture/biopsy.     No acute overnight events. Pain tolerable this AM. Afebrile. Appreciate ENT input, c/w zosyn, ciprodex drops, MRI with redemonstration of right-sided malignant otitis externa and acute right-sided otomastoiditis. Pending biopsy results. F/u additional ENT recs. Will need outpatient follow up for HPV/ASCUS history. D/w HS team. Remainder as above.
47 year old female with history of HPV/ASCUS, HTN, recurrent ear infections/pain, now presents with progressive R ear and facial pain s/p ENT evaluation and culture/biopsy.  Afebrile. Appreciate ENT and ID input. Plan for 6 weeks of IV abx with cipro per ID, s/p PICC placement 4/21. Outpatient ENT follow up for review of biopsy results once they return. Will also need outpatient follow up for HPV/ASCUS history. Positive QuantiFeron gold- check CXR, no symptoms of active TB. Pending antibiotic set up at home, dc likely Monday 4/24. D/w HS team. Remainder as above.
Ms. Aguilar is a 47-yo with hx of ASCUS w/ HPV+ (never worked-up due to insurance issues), prior hospitalizations for Otitis Externa that failed oral abx requiring prolonged oral abx regimens w/ most recent Otitis Externa 3/2023 treated w/ Oral Ciprofloxacin regimen, who presents for continuing right-sided ear pain, found to have persistent Otitis Externa w/ ENT evaluating, being treated w/ IV and otic abx, now cleared from ENT perspective, with plan for long-term 6 week abx per ID.   Patient is pending Atb home set up.  ID follow up positive QuantiFeron, reporting and treatment.

## 2023-04-24 NOTE — PROGRESS NOTE ADULT - PROBLEM SELECTOR PLAN 6
DVT PPx: SCDs  Diet: Regular, adjust as needed. a1c normal.   Bowel Regimen: As needed  Code: Full  Dispo: Pt functional, no PT/OT, Home.   Pharmacy:  PCP:   Communication: DVT PPx: SCDs  Diet: Regular, adjust as needed. a1c normal.   Bowel Regimen: As needed  Code: Full  Dispo: DC to home 4/25 once abx arrive

## 2023-04-24 NOTE — PROGRESS NOTE ADULT - PROBLEM SELECTOR PLAN 1
- Overall improving based on physical exam, more debridement done at bedside, patient tolerated well.  - f/u biopsy of tissue from right ear canal rule out SCC, primary team will call pathology today for report follow up  - continue ciprofloxacin 0.3% ophthalmic solution 5 drops twice a day  and dexamethasone 0.1% ophthalmic solution 5 drops  twice a day to right ear, please discharge patient with ciprodex drops 5 drops BID for another 2 weeks on discharge when medically cleared from primary team  - Continue IV zosyn per ID  - Trending WBC and fever  - ENT will continue to follow daily for ear debridement while patient is still admitted.  - Case discussed with Dr. Patino and senior resident  - Patient can be discharged from ENT perspective once ID establish antibiotic regiment and duration.   - Please have patient follow up outpatient with Dr. Wolfe in 1 week of discharge, please call [phone] for an appointment.

## 2023-04-24 NOTE — PROGRESS NOTE ADULT - PROBLEM SELECTOR PLAN 5
F/u CXR  Otherwise asymptomatic  Will have to get collateral on if patient previously treated for latent TB  Previously received BCG vaccine should not interfere with quantiferon gold test however.

## 2023-04-24 NOTE — PROGRESS NOTE ADULT - SUBJECTIVE AND OBJECTIVE BOX
PROGRESS NOTE:   Authored by Dr. Kanchan Killian MD (PGY-1)  After 7 PM, please contact night float at #[phone] or [phone]  **************************************************************************    Patient is a 47y old  Female who presents with a chief complaint of Otitis Externa (23 Apr 2023 19:40)      INTERVAL HPI / OVERNIGHT EVENTS:  Patient was seen and examined at bedside. As per nurse and patient, no o/n events, patient resting comfortably. No complaints at this time.     ADDITIONAL REVIEW OF SYSTEMS:  Patient denies fevers, chills, chest pain, shortness of breath, nausea, abdominal pain, diarrhea, constipation, dysuria, leg swelling, headache, light headedness. ROS otherwise negative.     PHYSICAL EXAM:  Vital Signs Last 24 Hrs  T(C): 36.6 (24 Apr 2023 05:10), Max: 36.9 (23 Apr 2023 21:10)  T(F): 97.9 (24 Apr 2023 05:10), Max: 98.5 (23 Apr 2023 21:10)  HR: 83 (24 Apr 2023 05:10) (83 - 86)  BP: 140/80 (24 Apr 2023 05:10) (123/65 - 140/80)  BP(mean): --  RR: 18 (24 Apr 2023 05:10) (18 - 18)  SpO2: 100% (24 Apr 2023 05:10) (100% - 100%)    Parameters below as of 24 Apr 2023 05:10  Patient On (Oxygen Delivery Method): room air        CONSTITUTIONAL: NAD, well-developed, AxO X3  HEENT: PERRL, EOMI, sclera non-icteric, neck supple, trachea midline, no masses, no JVD, MMM  CARDIOVASCULAR: Regular rate and rhythm, normal S1 and S2, no murmur/rub/gallop; No lower extremity edema; Peripheral pulses are 2+ bilaterally  RESPIRATORY: Normal respiratory effort; lungs are clear to auscultation bilaterally  ABDOMEN: Nontender to palpation, normoactive bowel sounds, no rebound/guarding; No hepatosplenomegaly  MUSCLOSKELETAL: no clubbing or cyanosis of digits; no joint swelling or tenderness to palpation  SKIN: Normal temperature, warm, dry, no rashes    MEDICATIONS  (STANDING):  chlorhexidine 2% Cloths 1 Application(s) Topical daily  ciprofloxacin  0.3% Ophthalmic Solution for Otic Use 5 Drop(s) Right Ear two times a day  dexAMETHasone 0.1% Ophthalmic Solution for OTIC Use 5 Drop(s) Right Ear two times a day  gabapentin 100 milliGRAM(s) Oral daily  piperacillin/tazobactam IVPB.. 3.375 Gram(s) IV Intermittent every 8 hours    MEDICATIONS  (PRN):  acetaminophen     Tablet .. 650 milliGRAM(s) Oral every 6 hours PRN Temp greater or equal to 38C (100.4F), Mild Pain (1 - 3)  aluminum hydroxide/magnesium hydroxide/simethicone Suspension 30 milliLiter(s) Oral every 4 hours PRN Dyspepsia  ketorolac   Injectable 15 milliGRAM(s) IV Push every 8 hours PRN Severe Pain (7 - 10)  melatonin 3 milliGRAM(s) Oral at bedtime PRN Insomnia      Allergies    No Known Allergies    Intolerances        LABS:                  CAPILLARY BLOOD GLUCOSE        I&O's Summary        Tele Reviewed:    RADIOLOGY & ADDITIONAL TESTS:  Results Reviewed:   Imaging Personally Reviewed:  Electrocardiogram Personally Reviewed:     PROGRESS NOTE:   Authored by Dr. Kanchan Killian MD (PGY-1)  After 7 PM, please contact night float at #[phone] or [phone]  **************************************************************************    Patient is a 47y old  Female who presents with a chief complaint of Otitis Externa (23 Apr 2023 19:40)      INTERVAL HPI / OVERNIGHT EVENTS:  Patient was seen and examined at bedside. As per nurse and patient, no o/n events, patient resting comfortably. No complaints at this time.     ADDITIONAL REVIEW OF SYSTEMS:  Patient denies fevers, chills, chest pain, shortness of breath, nausea, abdominal pain, diarrhea, constipation, dysuria, leg swelling, headache, light headedness. ROS otherwise negative.     PHYSICAL EXAM:  Vital Signs Last 24 Hrs  T(C): 36.6 (24 Apr 2023 05:10), Max: 36.9 (23 Apr 2023 21:10)  T(F): 97.9 (24 Apr 2023 05:10), Max: 98.5 (23 Apr 2023 21:10)  HR: 83 (24 Apr 2023 05:10) (83 - 86)  BP: 140/80 (24 Apr 2023 05:10) (123/65 - 140/80)  BP(mean): --  RR: 18 (24 Apr 2023 05:10) (18 - 18)  SpO2: 100% (24 Apr 2023 05:10) (100% - 100%)    Parameters below as of 24 Apr 2023 05:10  Patient On (Oxygen Delivery Method): room air        CONSTITUTIONAL: NAD, well-developed, AxO X3  HEENT: PERRL, EOMI, sclera non-icteric, neck supple, trachea midline, no masses, no JVD, MMM, R ear with crusting   CARDIOVASCULAR: Regular rate and rhythm, normal S1 and S2, no murmur/rub/gallop; No lower extremity edema; Peripheral pulses are 2+ bilaterally  RESPIRATORY: Normal respiratory effort; lungs are clear to auscultation bilaterally  ABDOMEN: Nontender to palpation, normoactive bowel sounds, no rebound/guarding; No hepatosplenomegaly  MUSCLOSKELETAL: no clubbing or cyanosis of digits; no joint swelling or tenderness to palpation  SKIN: Normal temperature, warm, dry, no rashes    MEDICATIONS  (STANDING):  chlorhexidine 2% Cloths 1 Application(s) Topical daily  ciprofloxacin  0.3% Ophthalmic Solution for Otic Use 5 Drop(s) Right Ear two times a day  dexAMETHasone 0.1% Ophthalmic Solution for OTIC Use 5 Drop(s) Right Ear two times a day  gabapentin 100 milliGRAM(s) Oral daily  piperacillin/tazobactam IVPB.. 3.375 Gram(s) IV Intermittent every 8 hours    MEDICATIONS  (PRN):  acetaminophen     Tablet .. 650 milliGRAM(s) Oral every 6 hours PRN Temp greater or equal to 38C (100.4F), Mild Pain (1 - 3)  aluminum hydroxide/magnesium hydroxide/simethicone Suspension 30 milliLiter(s) Oral every 4 hours PRN Dyspepsia  ketorolac   Injectable 15 milliGRAM(s) IV Push every 8 hours PRN Severe Pain (7 - 10)  melatonin 3 milliGRAM(s) Oral at bedtime PRN Insomnia      Allergies    No Known Allergies    Intolerances        LABS:                  CAPILLARY BLOOD GLUCOSE        I&O's Summary        Tele Reviewed:    RADIOLOGY & ADDITIONAL TESTS:  Results Reviewed:   Imaging Personally Reviewed:  Electrocardiogram Personally Reviewed:

## 2023-04-25 ENCOUNTER — TRANSCRIPTION ENCOUNTER (OUTPATIENT)
Age: 48
End: 2023-04-25

## 2023-04-25 VITALS
RESPIRATION RATE: 18 BRPM | SYSTOLIC BLOOD PRESSURE: 134 MMHG | OXYGEN SATURATION: 100 % | HEART RATE: 80 BPM | TEMPERATURE: 98 F | DIASTOLIC BLOOD PRESSURE: 96 MMHG

## 2023-04-25 RX ORDER — ACETAMINOPHEN 500 MG
650 TABLET ORAL EVERY 6 HOURS
Refills: 0 | Status: DISCONTINUED | OUTPATIENT
Start: 2023-04-25 | End: 2023-04-25

## 2023-04-25 RX ADMIN — Medication 650 MILLIGRAM(S): at 03:30

## 2023-04-25 RX ADMIN — Medication 650 MILLIGRAM(S): at 04:25

## 2023-04-25 RX ADMIN — PIPERACILLIN AND TAZOBACTAM 200 GRAM(S): 4; .5 INJECTION, POWDER, LYOPHILIZED, FOR SOLUTION INTRAVENOUS at 07:00

## 2023-04-25 RX ADMIN — PIPERACILLIN AND TAZOBACTAM 200 GRAM(S): 4; .5 INJECTION, POWDER, LYOPHILIZED, FOR SOLUTION INTRAVENOUS at 01:08

## 2023-04-25 RX ADMIN — Medication 5 DROP(S): at 07:00

## 2023-04-25 NOTE — PROGRESS NOTE ADULT - PROBLEM SELECTOR PROBLEM 4
ASCUS with positive high risk HPV cervical

## 2023-04-25 NOTE — PROGRESS NOTE ADULT - PROVIDER SPECIALTY LIST ADULT
ENT
ENT
Infectious Disease
ENT
Internal Medicine
ENT
Internal Medicine

## 2023-04-25 NOTE — DISCHARGE NOTE NURSING/CASE MANAGEMENT/SOCIAL WORK - PATIENT PORTAL LINK FT
You can access the FollowMyHealth Patient Portal offered by [facility] by registering at the following website: [email or web address]. By joining [employer]’s FollowMyHealth portal, you will also be able to view your health information using other applications (apps) compatible with our system.

## 2023-04-25 NOTE — PROGRESS NOTE ADULT - REASON FOR ADMISSION
Otitis Externa

## 2023-04-25 NOTE — DISCHARGE NOTE NURSING/CASE MANAGEMENT/SOCIAL WORK - NSDCPEFALRISK_GEN_ALL_CORE
For information on Fall & Injury Prevention, visit: [email or web address] OR  [email or web address] OR  https://www.cdc.gov/steadi/patient.html

## 2023-04-25 NOTE — PROGRESS NOTE ADULT - PROBLEM SELECTOR PLAN 6
DVT PPx: SCDs  Diet: Regular, adjust as needed. a1c normal.   Bowel Regimen: As needed  Code: Full  Dispo: DC to home 4/25 once abx arrive

## 2023-04-25 NOTE — PROGRESS NOTE ADULT - SUBJECTIVE AND OBJECTIVE BOX
PROGRESS NOTE:   Authored by Dr. Kanchan Killian MD (PGY-1)  After 7 PM, please contact night float at [phone] or [phone]  **************************************************************************    Patient is a 47y old  Female who presents with a chief complaint of Otitis Externa (24 Apr 2023 12:53)      INTERVAL HPI / OVERNIGHT EVENTS:  Patient was seen and examined at bedside. As per nurse and patient, no o/n events, patient resting comfortably. No complaints at this time.     ADDITIONAL REVIEW OF SYSTEMS:  Patient denies fevers, chills, chest pain, shortness of breath, nausea, abdominal pain, diarrhea, constipation, dysuria, leg swelling, headache, light headedness. ROS otherwise negative.     PHYSICAL EXAM:  Vital Signs Last 24 Hrs  T(C): 36.8 (25 Apr 2023 06:00), Max: 36.8 (24 Apr 2023 14:24)  T(F): 98.2 (25 Apr 2023 06:00), Max: 98.2 (24 Apr 2023 14:24)  HR: 80 (25 Apr 2023 06:00) (80 - 85)  BP: 134/96 (25 Apr 2023 06:00) (128/66 - 134/96)  BP(mean): --  RR: 18 (25 Apr 2023 06:00) (18 - 18)  SpO2: 100% (25 Apr 2023 06:00) (100% - 100%)    Parameters below as of 25 Apr 2023 06:00  Patient On (Oxygen Delivery Method): room air        CONSTITUTIONAL: NAD, well-developed, AxO X3  HEENT: PERRL, EOMI, sclera non-icteric, neck supple, trachea midline, no masses, no JVD, MMM; crusting L internal ear  CARDIOVASCULAR: Regular rate and rhythm, normal S1 and S2, no murmur/rub/gallop; No lower extremity edema; Peripheral pulses are 2+ bilaterally  RESPIRATORY: Normal respiratory effort; lungs are clear to auscultation bilaterally  ABDOMEN: Nontender to palpation, normoactive bowel sounds, no rebound/guarding; No hepatosplenomegaly  MUSCLOSKELETAL: no clubbing or cyanosis of digits; no joint swelling or tenderness to palpation  SKIN: Normal temperature, warm, dry, no rashes    MEDICATIONS  (STANDING):  chlorhexidine 2% Cloths 1 Application(s) Topical daily  ciprofloxacin  0.3% Ophthalmic Solution for Otic Use 5 Drop(s) Right Ear two times a day  dexAMETHasone 0.1% Ophthalmic Solution for OTIC Use 5 Drop(s) Right Ear two times a day  gabapentin 100 milliGRAM(s) Oral daily  piperacillin/tazobactam IVPB.. 3.375 Gram(s) IV Intermittent <User Schedule>    MEDICATIONS  (PRN):  acetaminophen     Tablet .. 650 milliGRAM(s) Oral every 6 hours PRN Temp greater or equal to 38C (100.4F), Mild Pain (1 - 3), Moderate Pain (4 - 6), Severe Pain (7 - 10)  aluminum hydroxide/magnesium hydroxide/simethicone Suspension 30 milliLiter(s) Oral every 4 hours PRN Dyspepsia  melatonin 3 milliGRAM(s) Oral at bedtime PRN Insomnia      Allergies    No Known Allergies    Intolerances        LABS:                        11.8   6.69  )-----------( 195      ( 24 Apr 2023 06:09 )             38.3     04-24    137  |  104  |  17  ----------------------------<  85  4.4   |  22  |  0.68    Ca    10.4      24 Apr 2023 06:09  Phos  3.2     04-24  Mg     2.00     04-24              CAPILLARY BLOOD GLUCOSE        I&O's Summary        Tele Reviewed:    RADIOLOGY & ADDITIONAL TESTS:  Results Reviewed:   Imaging Personally Reviewed:  Electrocardiogram Personally Reviewed:

## 2023-04-25 NOTE — DISCHARGE NOTE NURSING/CASE MANAGEMENT/SOCIAL WORK - NSDCFUADDAPPT_GEN_ALL_CORE_FT
Please remember that you will need weekly blood testing. Please visit a [facility] lab to receive the appropriate blood testing every week.     Please perform these follow-ups:  - Infectious Disease (Dr. Mariya Huffman) in <4 weeks to discuss recent hospitalization and treatment with Zosyn.  - ENT (Dr. Wolfe) in <1 week, to follow-up on your hospitalization and discuss your biopsy results. Please call [phone] for an appointment.  - Primary Care Physician to discuss hospitalization and follow-up on calcium, low vitamin D levels, and history of ASCUS & HPV+. If you have trouble finding one, please call the number to make an appointment at [facility].

## 2023-04-25 NOTE — PROGRESS NOTE ADULT - PROBLEM SELECTOR PROBLEM 1
Otitis externa

## 2023-04-25 NOTE — PROGRESS NOTE ADULT - PROBLEM SELECTOR PLAN 1
- Pt w/ recurrent episodes of Otitis Externa since 2010. Episode in 2019 was presumed to be due to cotton-swab cleaning. Otitis Externa vs malignancy vs cholesteatoma, Pain improving after ENT debridement.  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         - Ear culture results w/ cutibacterium acnes which ID believes is contaminant         - IV Zosyn q6 (4/16-5/28), per ID will need 6 weeks total w/ PICC (Until May 28th), with weekly CBC/CMP/ESR/CRP, Follow-up w/ Dr. Huffman.          - Ciprofloxacin 0.3% ophthalmic solution in right ear 5 drops twice a day  and dexamethasone 0.1% ophthalmic solution 5 drops in right ear twice a day. On discharge, patient wll need ciprodex drops 5 drops BID for another 2 weeks.    - Gabapentin 100 mg QD  - PICC placed, antibiotics arranged. DC 4/25

## 2023-04-27 LAB — SURGICAL PATHOLOGY STUDY: SIGNIFICANT CHANGE UP

## 2023-05-22 ENCOUNTER — EMERGENCY (EMERGENCY)
Facility: HOSPITAL | Age: 48
LOS: 1 days | Discharge: HOME | End: 2023-05-22
Attending: EMERGENCY MEDICINE | Admitting: STUDENT IN AN ORGANIZED HEALTH CARE EDUCATION/TRAINING PROGRAM
Payer: COMMERCIAL

## 2023-05-22 VITALS
DIASTOLIC BLOOD PRESSURE: 100 MMHG | RESPIRATION RATE: 16 BRPM | SYSTOLIC BLOOD PRESSURE: 144 MMHG | TEMPERATURE: 99 F | HEART RATE: 89 BPM | HEIGHT: 62 IN | OXYGEN SATURATION: 100 %

## 2023-05-22 LAB
ALBUMIN SERPL ELPH-MCNC: 4 G/DL — SIGNIFICANT CHANGE UP (ref 3.3–5)
ALP SERPL-CCNC: 60 U/L — SIGNIFICANT CHANGE UP (ref 40–120)
ALT FLD-CCNC: 23 U/L — SIGNIFICANT CHANGE UP (ref 4–33)
ANION GAP SERPL CALC-SCNC: 11 MMOL/L — SIGNIFICANT CHANGE UP (ref 7–14)
AST SERPL-CCNC: 26 U/L — SIGNIFICANT CHANGE UP (ref 4–32)
BASOPHILS # BLD AUTO: 0.03 K/UL — SIGNIFICANT CHANGE UP (ref 0–0.2)
BASOPHILS NFR BLD AUTO: 0.8 % — SIGNIFICANT CHANGE UP (ref 0–2)
BILIRUB SERPL-MCNC: <0.2 MG/DL — SIGNIFICANT CHANGE UP (ref 0.2–1.2)
BUN SERPL-MCNC: 8 MG/DL — SIGNIFICANT CHANGE UP (ref 7–23)
CALCIUM SERPL-MCNC: 10.4 MG/DL — SIGNIFICANT CHANGE UP (ref 8.4–10.5)
CHLORIDE SERPL-SCNC: 103 MMOL/L — SIGNIFICANT CHANGE UP (ref 98–107)
CO2 SERPL-SCNC: 22 MMOL/L — SIGNIFICANT CHANGE UP (ref 22–31)
CREAT SERPL-MCNC: 0.58 MG/DL — SIGNIFICANT CHANGE UP (ref 0.5–1.3)
EGFR: 112 ML/MIN/1.73M2 — SIGNIFICANT CHANGE UP
EOSINOPHIL # BLD AUTO: 0.57 K/UL — HIGH (ref 0–0.5)
EOSINOPHIL NFR BLD AUTO: 15.7 % — HIGH (ref 0–6)
GLUCOSE SERPL-MCNC: 105 MG/DL — HIGH (ref 70–99)
HCT VFR BLD CALC: 42.4 % — SIGNIFICANT CHANGE UP (ref 34.5–45)
HGB BLD-MCNC: 13.2 G/DL — SIGNIFICANT CHANGE UP (ref 11.5–15.5)
IANC: 1.04 K/UL — LOW (ref 1.8–7.4)
IMM GRANULOCYTES NFR BLD AUTO: 0.3 % — SIGNIFICANT CHANGE UP (ref 0–0.9)
LYMPHOCYTES # BLD AUTO: 1.39 K/UL — SIGNIFICANT CHANGE UP (ref 1–3.3)
LYMPHOCYTES # BLD AUTO: 38.3 % — SIGNIFICANT CHANGE UP (ref 13–44)
MCHC RBC-ENTMCNC: 26.5 PG — LOW (ref 27–34)
MCHC RBC-ENTMCNC: 31.1 GM/DL — LOW (ref 32–36)
MCV RBC AUTO: 85.1 FL — SIGNIFICANT CHANGE UP (ref 80–100)
MONOCYTES # BLD AUTO: 0.59 K/UL — SIGNIFICANT CHANGE UP (ref 0–0.9)
MONOCYTES NFR BLD AUTO: 16.3 % — HIGH (ref 2–14)
NEUTROPHILS # BLD AUTO: 1.04 K/UL — LOW (ref 1.8–7.4)
NEUTROPHILS NFR BLD AUTO: 28.6 % — LOW (ref 43–77)
NRBC # BLD: 0 /100 WBCS — SIGNIFICANT CHANGE UP (ref 0–0)
NRBC # FLD: 0 K/UL — SIGNIFICANT CHANGE UP (ref 0–0)
PLATELET # BLD AUTO: 143 K/UL — LOW (ref 150–400)
POTASSIUM SERPL-MCNC: 4 MMOL/L — SIGNIFICANT CHANGE UP (ref 3.5–5.3)
POTASSIUM SERPL-SCNC: 4 MMOL/L — SIGNIFICANT CHANGE UP (ref 3.5–5.3)
PROT SERPL-MCNC: 7.2 G/DL — SIGNIFICANT CHANGE UP (ref 6–8.3)
RBC # BLD: 4.98 M/UL — SIGNIFICANT CHANGE UP (ref 3.8–5.2)
RBC # FLD: 20.8 % — HIGH (ref 10.3–14.5)
SODIUM SERPL-SCNC: 136 MMOL/L — SIGNIFICANT CHANGE UP (ref 135–145)
WBC # BLD: 3.63 K/UL — LOW (ref 3.8–10.5)
WBC # FLD AUTO: 3.63 K/UL — LOW (ref 3.8–10.5)

## 2023-05-22 PROCEDURE — 99223 1ST HOSP IP/OBS HIGH 75: CPT

## 2023-05-22 RX ORDER — DIPHENHYDRAMINE HCL 50 MG
25 CAPSULE ORAL ONCE
Refills: 0 | Status: COMPLETED | OUTPATIENT
Start: 2023-05-22 | End: 2023-05-22

## 2023-05-22 RX ORDER — CEFEPIME 1 G/1
1000 INJECTION, POWDER, FOR SOLUTION INTRAMUSCULAR; INTRAVENOUS ONCE
Refills: 0 | Status: COMPLETED | OUTPATIENT
Start: 2023-05-22 | End: 2023-05-22

## 2023-05-22 RX ADMIN — Medication 25 MILLIGRAM(S): at 19:43

## 2023-05-22 RX ADMIN — CEFEPIME 100 MILLIGRAM(S): 1 INJECTION, POWDER, FOR SOLUTION INTRAMUSCULAR; INTRAVENOUS at 19:43

## 2023-05-22 NOTE — ED CDU PROVIDER INITIAL DAY NOTE - ATTENDING APP SHARED VISIT CONTRIBUTION OF CARE
Clay ROLLINS: Patient is being treated for malignant otitis externa, now sent in for concern of allergic reaction to zosyn. Her reaction is an itchy rash all over. Exam is consistent with a very mild rash. Airway is intact. Patient given benadryl. I discussed this case with infectious disease and recommendation is to give cefepime. Home meds cannot be arranged at this time and recommending CDU for continued monitoring and ID evaluation tomorrow to help get home meds. Discussed with CDU who accepted for observation. CBC, CMP ordered. Discussed plan with patient. Clay ROLLINS: Patient is being treated for malignant otitis externa, now sent in for concern of allergic reaction to zosyn. Her reaction is an itchy rash all over. Exam is consistent with a very mild rash. Airway is intact. Patient given benadryl. I discussed this case with infectious disease and recommendation is to give cefepime. Home meds cannot be arranged at this time and recommending CDU for continued monitoring, Abx with Cefepime, and ID evaluation tomorrow to help get home meds. Discussed with CDU who accepted for observation. CBC, CMP ordered. Discussed plan with patient.

## 2023-05-22 NOTE — ED PROVIDER NOTE - OBJECTIVE STATEMENT
Clay ROLLINS: Patient is a 47-year-old female who was admitted from April 16 through April 25 for  malignant otitis externa, discharged with PICC line and 6 weeks of Zosyn, now here for rash since yesterday.   Patient states that she developed a rash last night after receiving Zosyn.  Another dose of Zosyn was given today at 8 AM and she had another worsening itchy rash.  She denies any shortness of breath, nausea, vomiting, fevers.  She states a visiting nurse called a physician who advised that she come to the hospital.  Patient has no fevers, chills. During patient's admission, patient had serial debridements by ENT at the bedside with improved pain.  She is scheduled to receive Zosyn through a PICC line in her  right arm from 4/16–4/16 - 5/28. She did not take any benadryl after the rash developed.

## 2023-05-22 NOTE — ED PROVIDER NOTE - PROGRESS NOTE DETAILS
Clay ROLLINS: Discussed with ID. Dr. Venu Nielsen, fellow, recommending Cefepime, CDU to help arrange for meds at home.

## 2023-05-22 NOTE — ED CDU PROVIDER INITIAL DAY NOTE - DETAILS
IV cefepime initiated in the ED; ED Provider spoke with ID; team will consult 5/23 daytime; general observation care / monitoring.

## 2023-05-22 NOTE — ED PROVIDER NOTE - CLINICAL SUMMARY MEDICAL DECISION MAKING FREE TEXT BOX
Clay ROLLINS: Patient is being treated for malignant otitis externa, now sent in for concern of allergic reaction to zosyn. Her reaction is an itchy rash all over. Airway is intact. Patient given benadryl. I discussed this case with infectious disease and recommendation is to give cefepime. Home meds cannot be arranged at this time and recommending CDU for continued monitoring and ID evaluation tomorrow to help get home meds. Discussed with CDU who accepted for observation. CBC, CMP ordered. Discussed plan with patient.

## 2023-05-22 NOTE — ED ADULT TRIAGE NOTE - CHIEF COMPLAINT QUOTE
Pt. c/o generalized pruritic rash since Saturday. PICC line noted to right upper arm. States she has been receiving antibiotics for right ear infection and "infection in the blood". Denies fevers or use of anything new.

## 2023-05-22 NOTE — ED ADULT NURSE NOTE - OBJECTIVE STATEMENT
Pt is a 46 y/o Female, A&Ox4, ambulatory with past medical hx of HTN, presenting to the ED with c/o generalized rash and pruritis since Saturday. Pt has PICC line in upper right arm and states she has been receiving antibiotics for infection in the blood. PICC line and antibiotics were started in April. Pt denies use of new soap/detergent/perfumes, chest pain, SOB, fever, cough, chills, abdominal pain, N/V/D, h/a, dizziness or any urinary symptoms at this time. Respirations even and unlabored, chest rise equal b/l. Small red circular rashes noted to b/l forearms. 20g IVL placed in LAC. Labs collected and sent. Medications administered as ordered, see MAR. NAD noted. Safety maintained throughout. Will continue to monitor.

## 2023-05-22 NOTE — ED CDU PROVIDER INITIAL DAY NOTE - PHYSICAL EXAMINATION
CONSTITUTIONAL:  Well appearing, awake, alert, oriented to person, place, time/situation and in no apparent distress.  Pt. is objectively comfortable appearing and verbalizing in full, clear, effortless sentences.  ENMT: NC/AT.  Airway patent.  Nasal mucosa clear.  Moist mucous membranes.  Neck supple.  EYES:  Clear OU.  CARDIAC:  Normal rate, regular rhythm.  Heart sounds S1 S2.  No murmurs, gallops, or rubs.  RESPIRATORY:  Breath sounds clear and equal bilaterally.  No wheezes, no rales, no rhonchi.  GASTROINTESTINAL:  Abdomen soft, non-distended, non-tender.  No rebound, no guarding.  NEUROLOGICAL:  Alert and oriented to person/place/time/situation.  No focal deficits; no tremors noted.   MUSCULOSKELETAL:  Range of motion is not limited.  Gait stable.    SKIN:  Faint areas of erythema to forearms.  Skin warm, dry.  PSYCHIATRIC:  Alert and oriented to person/place/time/situation.  Mood and affect WNL.  No apparent risk to self or others.

## 2023-05-22 NOTE — ED ADULT NURSE NOTE - NSFALLUNIVINTERV_ED_ALL_ED
Bed/Stretcher in lowest position, wheels locked, appropriate side rails in place/Call bell, personal items and telephone in reach/Instruct patient to call for assistance before getting out of bed/chair/stretcher/Non-slip footwear applied when patient is off stretcher/[place] to call system/Physically safe environment - no spills, clutter or unnecessary equipment/Purposeful proactive rounding/Room/bathroom lighting operational, light cord in reach

## 2023-05-22 NOTE — ED CDU PROVIDER INITIAL DAY NOTE - OBJECTIVE STATEMENT
Clay ROLLINS: Patient is a 47-year-old female who was admitted from April 16 through April 25 for  malignant otitis externa, discharged with PICC line and 6 weeks of Zosyn, now here for rash since yesterday.   Patient states that she developed a rash last night after receiving Zosyn.  Another dose of Zosyn was given today at 8 AM and she had another worsening itchy rash.  She denies any shortness of breath, nausea, vomiting, fevers.  She states a visiting nurse called a physician who advised that she come to the hospital.  Patient has no fevers, chills. During patient's admission, patient had serial debridements by ENT at the bedside with improved pain.  She is scheduled to receive Zosyn through a PICC line in her  right arm from 4/16–4/16 - 5/28. She did not take any benadryl after the rash developed.    CDU ESCOBAR Carrillo Note------  ED Provider HPI as above, reviewed.  Pt is a 48 yo female, PMH malignant otitis externa (admitted 4/16/23 - 4/25/23, was d/c'd with PICC line on Zosyn regimen), now presenting to the ED c/o generalized body rash since 5/21/23; pt states rash started after receiving Zosyn dose; states after another dose of Zosyn was given 5/22/23 morning, she developed exacerbation of pruritic rash.   Pt was evaluated in the ED; VSS, pt afebrile.  WBC 3.63, Hb 13.2, platelets 143.  CMP (mildly hemolyzed) grossly unremarkable.  Per ED Provider d/w ID, cefepime IV started; ID will consult 5/23/23; pt was sent to CDU for further management and advised tx regimen moving forward to be coordinated with case management for continued outpatient care.

## 2023-05-22 NOTE — ED PROVIDER NOTE - NSICDXPASTMEDICALHX_GEN_ALL_CORE_FT
PAST MEDICAL HISTORY:  Hypertension     Otitis externa (with evidence of otomastoiditis on CT)     ED MD

## 2023-05-22 NOTE — ED PROVIDER NOTE - PHYSICAL EXAMINATION
rash: small pruritic rash remnants visible on arms, no hives  Airway intact, right arm with PICC line, no erythema redness

## 2023-05-23 PROCEDURE — 99232 SBSQ HOSP IP/OBS MODERATE 35: CPT

## 2023-05-23 PROCEDURE — 99284 EMERGENCY DEPT VISIT MOD MDM: CPT | Mod: GC

## 2023-05-23 RX ORDER — CEFEPIME 1 G/1
2 INJECTION, POWDER, FOR SOLUTION INTRAMUSCULAR; INTRAVENOUS
Qty: 36 | Refills: 0
Start: 2023-05-23 | End: 2023-05-28

## 2023-05-23 RX ORDER — CEFEPIME 1 G/1
1000 INJECTION, POWDER, FOR SOLUTION INTRAMUSCULAR; INTRAVENOUS EVERY 8 HOURS
Refills: 0 | Status: DISCONTINUED | OUTPATIENT
Start: 2023-05-23 | End: 2023-05-23

## 2023-05-23 RX ORDER — CEFEPIME 1 G/1
2000 INJECTION, POWDER, FOR SOLUTION INTRAMUSCULAR; INTRAVENOUS EVERY 8 HOURS
Refills: 0 | Status: DISCONTINUED | OUTPATIENT
Start: 2023-05-23 | End: 2023-05-26

## 2023-05-23 RX ORDER — DIPHENHYDRAMINE HCL 50 MG
25 CAPSULE ORAL EVERY 4 HOURS
Refills: 0 | Status: DISCONTINUED | OUTPATIENT
Start: 2023-05-23 | End: 2023-05-26

## 2023-05-23 RX ADMIN — CEFEPIME 100 MILLIGRAM(S): 1 INJECTION, POWDER, FOR SOLUTION INTRAMUSCULAR; INTRAVENOUS at 03:47

## 2023-05-23 RX ADMIN — CEFEPIME 100 MILLIGRAM(S): 1 INJECTION, POWDER, FOR SOLUTION INTRAMUSCULAR; INTRAVENOUS at 22:53

## 2023-05-23 RX ADMIN — CEFEPIME 100 MILLIGRAM(S): 1 INJECTION, POWDER, FOR SOLUTION INTRAMUSCULAR; INTRAVENOUS at 11:58

## 2023-05-23 NOTE — CONSULT NOTE ADULT - SUBJECTIVE AND OBJECTIVE BOX
HPI:     48 yo female, recently admitted ( from April 16 till April 25) for malignant otitis externa s/p multiple debridement by ENT, failed Ciprofloxacin, pathology acute on chronic inflammation, discharged with PICC line and 6 weeks of Zosyn (last dose was supposed to be on 5/28), now here for rash since yesterday.  Patient states that she developed a rash last night after receiving Zosyn.  Another dose of Zosyn was given today at 8 AM and she had another worsening itchy rash.  She denies any shortness of breath, nausea, vomiting, fevers.  She states a visiting nurse called a physician who advised that she come to the hospital.  Patient has no fevers, chills.     ID consulted for further recommendations.     REVIEW OF SYSTEMS  [  ] ROS unobtainable because:    [  ] All other systems negative except as noted below    Constitutional:  [ ] fever [ ] chills  [ ] weight loss  [ ]night sweat  [ ]poor appetite/PO intake [ ]fatigue   Skin:  [ ] rash [ ] phlebitis	  Eyes: [ ] icterus [ ] pain  [ ] discharge	  ENMT: [ ] sore throat  [ ] thrush [ ] ulcers [ ] exudates [ ]anosmia  Respiratory: [ ] dyspnea [ ] hemoptysis [ ] cough [ ] sputum	  Cardiovascular:  [ ] chest pain [ ] palpitations [ ] edema	  Gastrointestinal:  [ ] nausea [ ] vomiting [ ] diarrhea [ ] constipation [ ] pain	  Genitourinary:  [ ] dysuria [ ] frequency [ ] hematuria [ ] discharge [ ] flank pain  [ ] incontinence  Musculoskeletal:  [ ] myalgias [ ] arthralgias [ ] arthritis  [ ] back pain  Neurological:  [ ] headache [ ] weakness [ ] seizures  [ ] confusion/altered mental status    prior hospital charts reviewed [V]  primary team notes reviewed [V]  other consultant notes reviewed [V]    PAST MEDICAL & SURGICAL HISTORY:  Otitis externa  (with evidence of otomastoiditis on CT)    Hypertension    No significant past surgical history    SOCIAL HISTORY:  - Denied smoking/vaping/alcohol/recreational drug use    FAMILY HISTORY:  No pertinent family history in first degree relatives    Allergies  No Known Allergies    ANTIMICROBIALS:  cefepime   IVPB 1000 every 8 hours    ANTIMICROBIALS (past 90 days):  MEDICATIONS  (STANDING):  cefepime   IVPB   100 mL/Hr IV Intermittent (05-22-23 @ 19:43)    cefepime   IVPB   100 mL/Hr IV Intermittent (05-23-23 @ 03:47)    OTHER MEDS:   MEDICATIONS  (STANDING):  diphenhydrAMINE 25 every 4 hours PRN    VITALS:  Vital Signs Last 24 Hrs  T(F): 98.8 (05-23-23 @ 06:06), Max: 98.8 (05-23-23 @ 01:37)    Vital Signs Last 24 Hrs  HR: 69 (05-23-23 @ 06:06) (65 - 89)  BP: 130/73 (05-23-23 @ 06:06) (128/84 - 146/97)  RR: 16 (05-23-23 @ 06:06)  SpO2: 98% (05-23-23 @ 06:06) (98% - 100%)  Wt(kg): --    EXAM:  Physical Exam:  Constitutional:  well preserved, comfortable  Head/Eyes: no icterus, PERRL, EOMI  ENT:  supple; no thrush  LUNGS:  CTA  CVS:  normal S1, S2, no murmur  Abd:  soft, non-tender; non-distended  Ext:  no edema  Vascular:  IV site no erythema tenderness or discharge  MSK:  joints without swelling  Neuro: AAO X 3, non- focal    Labs:                        13.2   3.63  )-----------( 143      ( 22 May 2023 19:48 )             42.4     05-22    136  |  103  |  8   ----------------------------<  105<H>  4.0   |  22  |  0.58    Ca    10.4      22 May 2023 19:48    TPro  7.2  /  Alb  4.0  /  TBili  <0.2  /  DBili  x   /  AST  26  /  ALT  23  /  AlkPhos  60  05-22    WBC Trend:  WBC Count: 3.63 (05-22-23 @ 19:48)    Auto Neutrophil #: 1.04 K/uL (05-22-23 @ 19:48)  Auto Neutrophil #: 3.75 K/uL (04-21-23 @ 05:15)  Auto Neutrophil #: 4.76 K/uL (04-17-23 @ 07:19)  Auto Neutrophil #: 7.16 K/uL (04-15-23 @ 23:36)  Auto Neutrophil #: 4.26 K/uL (04-07-23 @ 14:30)    Creatine Trend:  Creatinine, Serum: 0.58 (05-22)    Liver Biochemical Testing Trend:  Alanine Aminotransferase (ALT/SGPT): 23 (05-22)  Alanine Aminotransferase (ALT/SGPT): 14 (04-21)  Alanine Aminotransferase (ALT/SGPT): 15 (04-20)  Alanine Aminotransferase (ALT/SGPT): 12 (04-19)  Alanine Aminotransferase (ALT/SGPT): 11 (04-18)  Aspartate Aminotransferase (AST/SGOT): 26 (05-22-23 @ 19:48)  Aspartate Aminotransferase (AST/SGOT): 16 (04-21-23 @ 05:15)  Aspartate Aminotransferase (AST/SGOT): 15 (04-20-23 @ 05:12)  Aspartate Aminotransferase (AST/SGOT): 18 (04-19-23 @ 05:10)  Aspartate Aminotransferase (AST/SGOT): 18 (04-18-23 @ 06:00)  Bilirubin Total, Serum: <0.2 (05-22)  Bilirubin Total, Serum: 0.2 (04-21)  Bilirubin Total, Serum: 0.2 (04-20)  Bilirubin Total, Serum: 0.3 (04-19)  Bilirubin Total, Serum: 0.4 (04-18)    Trend LDH    Auto Eosinophil %: 15.7 % (05-22-23 @ 19:48)    MICROBIOLOGY:    HIV-1/2 Combo Result: Nonreact (04-20-23 @ 05:12)    A1C with Estimated Average Glucose Result: 4.7 % (04-17-23 @ 07:19)    RADIOLOGY:  imaging below personally reviewed           HPI:     48 yo female, recently admitted ( from April 16 till April 25) for malignant otitis externa s/p multiple debridement by ENT, failed Ciprofloxacin, pathology acute on chronic inflammation, discharged with PICC line and 6 weeks of Zosyn (last dose was supposed to be on 5/28), now here for rash since yesterday.  Patient states that she developed a rash last night after receiving Zosyn.  Another dose of Zosyn was given today at 8 AM and she had another worsening itchy rash.  She denies any shortness of breath, nausea, vomiting, fevers.  She states a visiting nurse called a physician who advised that she come to the hospital.  Patient has no fevers, chills.     ID consulted for further recommendations.     REVIEW OF SYSTEMS  [  ] ROS unobtainable because:    [X] All other systems negative except as noted below    Constitutional:  [ ] fever [ ] chills  [ ] weight loss  [ ]night sweat  [ ]poor appetite/PO intake [ ]fatigue   Skin:  [X] rash [ ] phlebitis	  Eyes: [ ] icterus [ ] pain  [ ] discharge	  ENMT: [ ] sore throat  [ ] thrush [ ] ulcers [ ] exudates [ ]anosmia  Respiratory: [ ] dyspnea [ ] hemoptysis [ ] cough [ ] sputum	  Cardiovascular:  [ ] chest pain [ ] palpitations [ ] edema	  Gastrointestinal:  [ ] nausea [ ] vomiting [ ] diarrhea [ ] constipation [ ] pain	  Genitourinary:  [ ] dysuria [ ] frequency [ ] hematuria [ ] discharge [ ] flank pain  [ ] incontinence  Musculoskeletal:  [ ] myalgias [ ] arthralgias [ ] arthritis  [ ] back pain  Neurological:  [ ] headache [ ] weakness [ ] seizures  [ ] confusion/altered mental status    prior hospital charts reviewed [V]  primary team notes reviewed [V]  other consultant notes reviewed [V]    PAST MEDICAL & SURGICAL HISTORY:  Otitis externa  (with evidence of otomastoiditis on CT)    Hypertension    No significant past surgical history    SOCIAL HISTORY:  - Denied smoking/vaping/alcohol/recreational drug use    FAMILY HISTORY:  No pertinent family history in first degree relatives    Allergies  No Known Allergies    ANTIMICROBIALS:  cefepime   IVPB 1000 every 8 hours    ANTIMICROBIALS (past 90 days):  MEDICATIONS  (STANDING):  cefepime   IVPB   100 mL/Hr IV Intermittent (05-22-23 @ 19:43)    cefepime   IVPB   100 mL/Hr IV Intermittent (05-23-23 @ 03:47)    OTHER MEDS:   MEDICATIONS  (STANDING):  diphenhydrAMINE 25 every 4 hours PRN    VITALS:  Vital Signs Last 24 Hrs  T(F): 98.8 (05-23-23 @ 06:06), Max: 98.8 (05-23-23 @ 01:37)    Vital Signs Last 24 Hrs  HR: 69 (05-23-23 @ 06:06) (65 - 89)  BP: 130/73 (05-23-23 @ 06:06) (128/84 - 146/97)  RR: 16 (05-23-23 @ 06:06)  SpO2: 98% (05-23-23 @ 06:06) (98% - 100%)  Wt(kg): --    EXAM:  Physical Exam:  Constitutional:  well preserved, comfortable  Head/Eyes: no icterus, PERRL, EOMI  ENT:  supple; no thrush  LUNGS:  CTA  CVS:  normal S1, S2, no murmur  Abd:  soft, non-tender; non-distended  Ext:  faint rash   Vascular:  RUE PICC line with no signs of infection   MSK:  joints without swelling  Neuro: AAO X 3, non- focal    Labs:                        13.2   3.63  )-----------( 143      ( 22 May 2023 19:48 )             42.4     05-22    136  |  103  |  8   ----------------------------<  105<H>  4.0   |  22  |  0.58    Ca    10.4      22 May 2023 19:48    TPro  7.2  /  Alb  4.0  /  TBili  <0.2  /  DBili  x   /  AST  26  /  ALT  23  /  AlkPhos  60  05-22    WBC Trend:  WBC Count: 3.63 (05-22-23 @ 19:48)    Auto Neutrophil #: 1.04 K/uL (05-22-23 @ 19:48)  Auto Neutrophil #: 3.75 K/uL (04-21-23 @ 05:15)  Auto Neutrophil #: 4.76 K/uL (04-17-23 @ 07:19)  Auto Neutrophil #: 7.16 K/uL (04-15-23 @ 23:36)  Auto Neutrophil #: 4.26 K/uL (04-07-23 @ 14:30)    Creatine Trend:  Creatinine, Serum: 0.58 (05-22)    Liver Biochemical Testing Trend:  Alanine Aminotransferase (ALT/SGPT): 23 (05-22)  Alanine Aminotransferase (ALT/SGPT): 14 (04-21)  Alanine Aminotransferase (ALT/SGPT): 15 (04-20)  Alanine Aminotransferase (ALT/SGPT): 12 (04-19)  Alanine Aminotransferase (ALT/SGPT): 11 (04-18)  Aspartate Aminotransferase (AST/SGOT): 26 (05-22-23 @ 19:48)  Aspartate Aminotransferase (AST/SGOT): 16 (04-21-23 @ 05:15)  Aspartate Aminotransferase (AST/SGOT): 15 (04-20-23 @ 05:12)  Aspartate Aminotransferase (AST/SGOT): 18 (04-19-23 @ 05:10)  Aspartate Aminotransferase (AST/SGOT): 18 (04-18-23 @ 06:00)  Bilirubin Total, Serum: <0.2 (05-22)  Bilirubin Total, Serum: 0.2 (04-21)  Bilirubin Total, Serum: 0.2 (04-20)  Bilirubin Total, Serum: 0.3 (04-19)  Bilirubin Total, Serum: 0.4 (04-18)    Trend LDH    Auto Eosinophil %: 15.7 % (05-22-23 @ 19:48)    MICROBIOLOGY:    HIV-1/2 Combo Result: Nonreact (04-20-23 @ 05:12)    A1C with Estimated Average Glucose Result: 4.7 % (04-17-23 @ 07:19)    RADIOLOGY:  imaging below personally reviewed

## 2023-05-23 NOTE — CONSULT NOTE ADULT - ATTENDING COMMENTS
47 year old female recently admitted from 4/16 - 4/25/23 for malignant otitis externa s/p debridement, was discharged with a picc line on 6 weeks of IV zosyn to complete 5/28/23 but developed a diffuse pruritic rash on her body yesterday. She contacted the ID office and was informed to come to the ED for evaluation. Now changed to cefepime, tolerating. Feels well overall. No ear pain. No headache. No fevers.    Recommend:  #Rash to antibiotic zosyn, malignant otitis externa  -Change zosyn to cefepime 2 grams IV q 8 hours to complete the abx course on 5/28/23  -R arm picc line intact  -Continue weekly CBC/ CMP/ ESR/ CRP  -Patient can followup with Dr Huffman in ID clinic.    Byron Cohen MD  Available through MS Teams  If no response, or after 5pm/weekends, call [phone]

## 2023-05-23 NOTE — ED CDU PROVIDER SUBSEQUENT DAY NOTE - ATTENDING APP SHARED VISIT CONTRIBUTION OF CARE
48 yo female, PMH malignant otitis externa (admitted 4/16/23 - 4/25/23, was d/c'd with PICC line on Zosyn regimen), now presenting to the ED c/o generalized body rash since 5/21/23; pt states rash started after receiving Zosyn dose; states after another dose of Zosyn was given 5/22/23 morning, she developed exacerbation of pruritic rash.   Pt was evaluated in the ED; VSS, pt afebrile.  WBC 3.63, Hb 13.2, platelets 143.  CMP (mildly hemolyzed) grossly unremarkable.  Per ED Provider d/w ID, cefepime IV started; ID will consult 5/23/23; pt was sent to CDU for further management and advised tx regimen moving forward to be coordinated with case management for continued outpatient care.  In the interim, pt objectively noted to be resting comfortably; pt has been clinically stable; no issues thus far.

## 2023-05-23 NOTE — CONSULT NOTE ADULT - ASSESSMENT
48 yo female, recently admitted ( from April 16 till April 25) for malignant otitis externa s/p multiple debridement by ENT, failed Ciprofloxacin, pathology acute on chronic inflammation, discharged with PICC line and 6 weeks of Zosyn (last dose was supposed to be on 5/28), now here for rash since yesterday.     Afebrile  No leukocytosis     Workup:   Ear Cx: Cutibacterium acnes     Antimicrobials:   Cefepime     #Malignant otitis externa with bony erosion on Zosyn for 6 weeks through 5/28, developed rash after Zosyn     Recommendations:        PT TO BE SEEN. PRELIM NOTE  PENDING RECS. PLEASE WAIT FOR FINAL RECS AFTER DISCUSSION WITH ATTENDING#         48 yo female, recently admitted ( from April 16 till April 25) for malignant otitis externa s/p multiple debridement by ENT, failed Ciprofloxacin, pathology acute on chronic inflammation, discharged with PICC line and 6 weeks of Zosyn (last dose was supposed to be on 5/28), now here for rash since yesterday.     Afebrile  No leukocytosis     Workup:   Ear Cx: Cutibacterium acnes     Antimicrobials:   Cefepime     #Malignant otitis externa with bony erosion on Zosyn for 6 weeks through 5/28, developed rash on Zosyn   #RUE PICC line with no signs of infection     Recommendations:  Change cefepime dosing to 2 g IV q 8hrs  Plan to continue antibiotics till 5/28  PICC line to be removed after antibiotics completion   Weekly CBC, CMP, CRP, ESR while on antibiotics      Seen and discussed with Dr Noel Nielsen MD, PGY4  ID fellow  [employer] Teams Preferred  After 5pm/weekends call [phone]

## 2023-05-23 NOTE — ED ADULT NURSE REASSESSMENT NOTE - NSFALLUNIVINTERV_ED_ALL_ED
Bed/Stretcher in lowest position, wheels locked, appropriate side rails in place/Call bell, personal items and telephone in reach/Instruct patient to call for assistance before getting out of bed/chair/stretcher/Non-slip footwear applied when patient is off stretcher/[place] to call system/Physically safe environment - no spills, clutter or unnecessary equipment/Purposeful proactive rounding/Room/bathroom lighting operational, light cord in reach

## 2023-05-23 NOTE — PROVIDER CONTACT NOTE (OTHER) - ASSESSMENT
CDU provider Tonny CODY notified CM for discharge. Pt consulted by ID with change of IV ABX. Pt active with [facility]. New referral sent and as per [facility] case accepted pending auth of new meds. Spoke with [facility] who states they do not have a RN available for tomorrow AM. ESCOBAR Lancaster spoke with [facility] pharmacists who clarified orders due to pt not d/c today. CM to follow with agency in AM for confirmation. Plan for discharge tomorrow.

## 2023-05-24 VITALS
RESPIRATION RATE: 18 BRPM | OXYGEN SATURATION: 99 % | SYSTOLIC BLOOD PRESSURE: 127 MMHG | DIASTOLIC BLOOD PRESSURE: 92 MMHG | HEART RATE: 113 BPM | TEMPERATURE: 98 F

## 2023-05-24 PROCEDURE — 99238 HOSP IP/OBS DSCHRG MGMT 30/<: CPT

## 2023-05-24 RX ORDER — LISINOPRIL 2.5 MG/1
10 TABLET ORAL ONCE
Refills: 0 | Status: COMPLETED | OUTPATIENT
Start: 2023-05-24 | End: 2023-05-24

## 2023-05-24 RX ADMIN — CEFEPIME 2000 MILLIGRAM(S): 1 INJECTION, POWDER, FOR SOLUTION INTRAMUSCULAR; INTRAVENOUS at 15:00

## 2023-05-24 RX ADMIN — Medication 25 MILLIGRAM(S): at 15:14

## 2023-05-24 RX ADMIN — CEFEPIME 100 MILLIGRAM(S): 1 INJECTION, POWDER, FOR SOLUTION INTRAMUSCULAR; INTRAVENOUS at 07:21

## 2023-05-24 RX ADMIN — LISINOPRIL 10 MILLIGRAM(S): 2.5 TABLET ORAL at 10:51

## 2023-05-24 RX ADMIN — CEFEPIME 100 MILLIGRAM(S): 1 INJECTION, POWDER, FOR SOLUTION INTRAMUSCULAR; INTRAVENOUS at 14:14

## 2023-05-24 NOTE — ED CDU PROVIDER DISPOSITION NOTE - PATIENT PORTAL LINK FT
You can access the FollowMyHealth Patient Portal offered by [facility] by registering at the following website: [email or web address]. By joining [facility]’s FollowMyHealth portal, you will also be able to view your health information using other applications (apps) compatible with our system.

## 2023-05-24 NOTE — ED CDU PROVIDER DISPOSITION NOTE - NSFOLLOWUPINSTRUCTIONS_ED_ALL_ED_FT
Follow-up with infectious disease for further management of your ear infection.  Continue cefepime 2 g every 8 hours until 5/28.  Your PICC line will need to be removed after completion of antibiotics.  Return to the ER for fever, drainage from the ear, rash, or any other concerns.

## 2023-05-24 NOTE — PROVIDER CONTACT NOTE (OTHER) - ASSESSMENT
Spoke with Margarte at [facility] [phone], medication and supplies will be delivered to patient's home today before 8pm. Homecare nurse will meet patient at home this evening for administration of 3rd dose of IVAB.  [facility] requested PICC dressing changed prior to hospital discharge today.  Plan discussed with ESCOBAR Cox and HANANE Lopez and all in agreement with discharge plan.

## 2023-05-24 NOTE — ED ADULT NURSE REASSESSMENT NOTE - NS ED NURSE REASSESS COMMENT FT1
Break RN: Pt received A&Ox4, ambulatory at baseline. Pt resting in stretcher comfortably. Respirations even and unlabored, NAD. Pt medicated per MAR. Denies any acute complaints at this time.
Pt denies any pain, SOB, no rash, itchiness noted. Pt not in any distress. Picc line is flushed as ordered. Picc line flushes well and has positive blood return. last dressing change 5/22/23. safety maintained. call bell with in reach. will continue to monitor

## 2023-05-24 NOTE — ED CDU PROVIDER SUBSEQUENT DAY NOTE - CLINICAL SUMMARY MEDICAL DECISION MAKING FREE TEXT BOX
IV cefepime initiated in the ED; ED Provider spoke with ID; team will consult 5/23 daytime; general observation care / monitoring.
IV cefepime initiated in the ED; ID consulted with recommendations appreciated; case management coordinating visiting RN care; general observation care / monitoring.

## 2023-05-24 NOTE — ED CDU PROVIDER SUBSEQUENT DAY NOTE - NSICDXPASTMEDICALHX_GEN_ALL_CORE_FT
PAST MEDICAL HISTORY:  Hypertension     Otitis externa (with evidence of otomastoiditis on CT)    
PAST MEDICAL HISTORY:  Hypertension     Otitis externa (with evidence of otomastoiditis on CT)

## 2023-05-24 NOTE — ED CDU PROVIDER SUBSEQUENT DAY NOTE - HISTORY
46 yo female, PMH malignant otitis externa (admitted 4/16/23 - 4/25/23, was d/c'd with PICC line on Zosyn regimen), now presenting to the ED c/o generalized body rash since 5/21/23; pt states rash started after receiving Zosyn dose; states after another dose of Zosyn was given 5/22/23 morning, she developed exacerbation of pruritic rash.   Pt was evaluated in the ED; VSS, pt afebrile.  WBC 3.63, Hb 13.2, platelets 143.  CMP (mildly hemolyzed) grossly unremarkable.  Per ED Provider d/w ID, cefepime IV started; ID will consult 5/23/23; pt was sent to CDU for further management and advised tx regimen moving forward to be coordinated with case management for continued outpatient care.  On 5/23, ID consulted; recommendations appreciated and case management made aware.  Completion of coordination of visiting RN care to be completed 5/24/23 by case management, so pt is in CDU until this process is fully completed.  In the interim, pt objectively noted to be resting comfortably; pt has been clinically stable; no issues thus far.
46 yo female, PMH malignant otitis externa (admitted 4/16/23 - 4/25/23, was d/c'd with PICC line on Zosyn regimen), now presenting to the ED c/o generalized body rash since 5/21/23; pt states rash started after receiving Zosyn dose; states after another dose of Zosyn was given 5/22/23 morning, she developed exacerbation of pruritic rash.   Pt was evaluated in the ED; VSS, pt afebrile.  WBC 3.63, Hb 13.2, platelets 143.  CMP (mildly hemolyzed) grossly unremarkable.  Per ED Provider d/w ID, cefepime IV started; ID will consult 5/23/23; pt was sent to CDU for further management and advised tx regimen moving forward to be coordinated with case management for continued outpatient care.  In the interim, pt objectively noted to be resting comfortably; pt has been clinically stable; no issues thus far.

## 2023-05-24 NOTE — ED CDU PROVIDER SUBSEQUENT DAY NOTE - ATTENDING APP SHARED VISIT CONTRIBUTION OF CARE
I have personally performed a face to face medical and diagnostic evaluation of the patient. I have discussed with and reviewed the ACP's note and agree with the History, ROS, Physical Exam and MDM unless otherwise indicated. A brief summary of my personal evaluation and impression can be found below.    47-year-old female with past medical history of malignant otitis externa, admitted to the hospital from 4 16-4 25 and DC'd with a PICC line presenting to the ER with generalized rash after receiving Zosyn.  States that it is pruritic.  In the ED, vital signs stable, lab work nonactionable.  Concern for possible allergic reaction to Zosyn.  After discussion with ID, placed on cefepime through the PICC line.  Patient placed in CDU for monitoring.  States that she feels much better this a.m., rash now resolved.  No complaints.  Patient waiting case management discussion for home antibiotics.  Will reassess to dispo. Mercedes Marvin, ED Attending

## 2023-05-24 NOTE — ED CDU PROVIDER SUBSEQUENT DAY NOTE - NS ED ATTENDING STATEMENT MOD
This was a shared visit with the GISELA. I reviewed and verified the documentation and independently performed the documented:
This was a shared visit with the GISELA. I reviewed and verified the documentation and independently performed the documented:

## 2023-05-24 NOTE — ED CDU PROVIDER DISPOSITION NOTE - ATTENDING CONTRIBUTION TO CARE
I have personally performed a face to face medical and diagnostic evaluation of the patient. I have discussed with and reviewed the ACP's note and agree with the History, ROS, Physical Exam and MDM unless otherwise indicated. A brief summary of my personal evaluation and impression can be found below.     47-year-old female past medical history of malignant otitis externa, undergoing treatment via PICC with Zosyn, here with rash x2 days after Zosyn administration at home.  Patient was in the CDU pending ID evaluation and recommendations.  ID recommending cefepime 2 g every 8 hours.  Patient was seen by our case management team,, medication and supplies to be delivered tonight before 8 PM, when the home care nurse will be meeting with the patient to discuss cefepime administration.  Patient denies any complaints and is okay with this plan. Given return precautions and follow-up instructions with teach back.  Plan for DC. Mercedes Marvin, ED Attending

## 2023-05-24 NOTE — ED CDU PROVIDER DISPOSITION NOTE - CLINICAL COURSE
Patient is a 47-year-old female past medical history of malignant otitis externa, undergoing treatment via PICC with Zosyn, here with rash x2 days after Zosyn administration at home.  Patient was in the CDU pending ID evaluation and recommendations.  ID recommending cefepime 2 g every 8 hours.  Patient was seen by our case management team,, medication and supplies to be delivered tonight before 8 PM, when the home care nurse will be meeting with the patient to discuss cefepime administration.  Patient denies any complaints and is okay with this plan

## 2023-06-01 ENCOUNTER — APPOINTMENT (OUTPATIENT)
Dept: OTOLARYNGOLOGY | Facility: CLINIC | Age: 48
End: 2023-06-01
Payer: COMMERCIAL

## 2023-06-01 VITALS
HEART RATE: 80 BPM | SYSTOLIC BLOOD PRESSURE: 144 MMHG | DIASTOLIC BLOOD PRESSURE: 95 MMHG | TEMPERATURE: 97.2 F | WEIGHT: 177 LBS | BODY MASS INDEX: 32.57 KG/M2 | HEIGHT: 62 IN

## 2023-06-01 PROCEDURE — 69210 REMOVE IMPACTED EAR WAX UNI: CPT

## 2023-06-01 PROCEDURE — 99214 OFFICE O/P EST MOD 30 MIN: CPT | Mod: 25

## 2023-06-01 RX ORDER — MECLIZINE HYDROCHLORIDE 25 MG/1
25 TABLET ORAL 3 TIMES DAILY
Qty: 90 | Refills: 5 | Status: ACTIVE | COMMUNITY
Start: 2023-06-01 | End: 1900-01-01

## 2023-06-01 NOTE — HISTORY OF PRESENT ILLNESS
[Hearing Loss] : hearing loss [Ear Fullness] : ear fullness [de-identified] : 46 yo\par PAtient presents s/p ED at [facility]  for Right Malignant OE. She was hospitalized April 16 - 25 for right Malignant OE was treated with IV Cipro. She was discharged with a PICC and Zoysn for 6 weeks. The PICC line was removed on Tuesday May 30. Today she is feeling better. Denies ear pain, drainage or hearing loss. \par H/o recurrent righht ear infections since 2014 [place] the [place] and a "stone "was found lodged in her right ear. This was treated by debridement at the time bit infection have still been recurrent\par No h/o Diabetes [Nasal Congestion] : no nasal congestion [Neck Mass] : no neck mass [Cough] : no cough

## 2023-06-01 NOTE — END OF VISIT
[FreeTextEntry3] : I personally saw and examined DL BRAVO in detail. I spoke to ESCOBAR Pillai regarding the assessment and plan of care. I reviewed the above assessment and plan of care, and agree. I have made changes in changes in the body of the note where appropriate.I personally reviewed the HPI, PMH, FH, SH, ROS and medications/allergies. I have spoken to ESCOBAR Pillai regarding the history and have personally determined the assessment and plan of care, and documented this myself. I was present and participated in all key portions of the encounter and all procedures noted above. I have made changes in the body of the note where appropriate.\par \par Attesting Faculty: See Attending Signature Below \par \par \par  [Time Spent: ___ minutes] : I have spent [unfilled] minutes of time on the encounter.

## 2023-06-01 NOTE — ASSESSMENT
[FreeTextEntry1] : Ms. BRAVO 47 year F presents s/p ED visit for Right ANASTACIO. She was hospitalized April 16 - 25 for right malignant OE was treated with IV Cipro. She was discharged with a PICC and Zoysn for 6 weeks. The PICC line was removed on Tuesday May 30. Today she is feeling better. Denies ear pain, drainage or hearing loss.\par \par TM Perf with Cholesteatoma \par -moist debris cleaned with suction, then placed mastoid power placed\par -H2O precautions reviewed\par \par Pt may require otologic surgery\par consider repeat CT scan of Temporal Bones after further office debridement\par \par f/u 1 week or  prn

## 2023-06-01 NOTE — PROCEDURE
[FreeTextEntry3] : After informed verbal consent is obtained, cerumen is removed from the right ear canal with suction Pt \par became dizzy after debridement\par

## 2023-06-01 NOTE — PHYSICAL EXAM
[de-identified] : Right Moist debris  [Midline] : trachea located in midline position [Normal] : the left mastoid was normal [Hearing Loss Right Only] : normal [Hearing Loss Left Only] : normal [FreeTextEntry8] : moist debris  [de-identified] : perf with granulation tissue w/ cholesteatoma

## 2023-06-01 NOTE — DATA REVIEWED
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

## 2023-06-08 ENCOUNTER — APPOINTMENT (OUTPATIENT)
Dept: OTOLARYNGOLOGY | Facility: CLINIC | Age: 48
End: 2023-06-08
Payer: COMMERCIAL

## 2023-06-08 VITALS
BODY MASS INDEX: 32.57 KG/M2 | DIASTOLIC BLOOD PRESSURE: 95 MMHG | WEIGHT: 177 LBS | HEIGHT: 62 IN | TEMPERATURE: 98 F | SYSTOLIC BLOOD PRESSURE: 144 MMHG | HEART RATE: 110 BPM

## 2023-06-08 PROCEDURE — 69210 REMOVE IMPACTED EAR WAX UNI: CPT

## 2023-06-08 PROCEDURE — 99214 OFFICE O/P EST MOD 30 MIN: CPT | Mod: 25

## 2023-06-08 RX ORDER — OFLOXACIN OTIC 3 MG/ML
0.3 SOLUTION AURICULAR (OTIC)
Qty: 1 | Refills: 2 | Status: ACTIVE | COMMUNITY
Start: 2023-06-08 | End: 1900-01-01

## 2023-06-08 NOTE — PHYSICAL EXAM
[Midline] : trachea located in midline position [Normal] : the left mastoid was normal [Hearing Loss Right Only] : normal [Hearing Loss Left Only] : normal [FreeTextEntry8] : moist debris  [de-identified] : perf with granulation tissue w/ cholesteatoma

## 2023-06-08 NOTE — ASSESSMENT
[FreeTextEntry1] : Ms. BRAVO 47 year F presents s/p right ear debridement w/ TM Perf w/ cholesteatoma. Dizziness improved since last visit. Ear pain and drainage has significantly improved as well. \par \par TM Perf with Cholesteatoma \par -cholesteatoma debrided right ear, gtts placed after \par -Rx: Ofloxacin drops \par -H2O precautions reviewed\par -Pt may require otologic surgery\par -consider repeat CT scan of Temporal Bones after further office debridement\par \par f/u 1 week or  prn

## 2023-06-08 NOTE — DATA REVIEWED
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

## 2023-06-08 NOTE — HISTORY OF PRESENT ILLNESS
[Hearing Loss] : hearing loss [Ear Fullness] : ear fullness [de-identified] : 48 yo\par PAtient presents s/p ED at [facility]  for Right Malignant OE. She was hospitalized April 16 - 25 for right Malignant OE was treated with IV Cipro. She was discharged with a PICC and Zoysn for 6 weeks. The PICC line was removed on Tuesday May 30. Today she is feeling better. Denies ear pain, drainage or hearing loss. \par H/o recurrent righht ear infections since 2014 whe she was at the beach and a "stone "was found lodged in her right ear. This was treated by debridement at the time bit infection have still been recurrent\par No h/o Diabetes [FreeTextEntry1] : 6/8/2023\par PAtient presents for follow up s/p right ear debridement, states dizziness, ear pain and drainage is improving.  [Nasal Congestion] : no nasal congestion [Neck Mass] : no neck mass [Cough] : no cough

## 2023-06-15 ENCOUNTER — APPOINTMENT (OUTPATIENT)
Dept: OTOLARYNGOLOGY | Facility: CLINIC | Age: 48
End: 2023-06-15
Payer: COMMERCIAL

## 2023-06-15 VITALS
BODY MASS INDEX: 32.57 KG/M2 | SYSTOLIC BLOOD PRESSURE: 136 MMHG | HEART RATE: 92 BPM | TEMPERATURE: 97.9 F | DIASTOLIC BLOOD PRESSURE: 99 MMHG | HEIGHT: 62 IN | WEIGHT: 177 LBS

## 2023-06-15 PROCEDURE — 69210 REMOVE IMPACTED EAR WAX UNI: CPT

## 2023-06-15 PROCEDURE — 99214 OFFICE O/P EST MOD 30 MIN: CPT | Mod: 25,57

## 2023-06-15 PROCEDURE — T1013: CPT

## 2023-06-15 NOTE — DATA REVIEWED
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

## 2023-06-15 NOTE — PROCEDURE
[FreeTextEntry3] : After informed verbal consent is obtained, cerumen is removed from the right and left ear canal with suction \par sl Dizzy s/p debridement

## 2023-06-15 NOTE — ASSESSMENT
[FreeTextEntry1] : Ms. BRAVO 47 year F presents s/p right ear debridement w/ TM Perf w/ cholesteatoma. Dizziness improved since last visit. \par \par Post/Sup TM Perf with Cholesteatoma: \par -debrided right ear \par -wax cleaned from left ear \par -Referred to [facility] for surgical intervention \par -H2O precautions reviewed\par -consider repeat CT scan of Temporal Bones after further office debridement\par \par f/u   prn

## 2023-06-15 NOTE — PHYSICAL EXAM
[Midline] : trachea located in midline position [Normal] : the left mastoid was normal [Binocular Microscopic Exam] : Binocular microscopic exam was performed [Hearing Loss Right Only] : normal [Hearing Loss Left Only] : normal [FreeTextEntry8] : moist debris  and bilat wax [de-identified] : perf with granulation tissue w/ cholesteatoma

## 2023-06-15 NOTE — END OF VISIT
[Time Spent: ___ minutes] : I have spent [unfilled] minutes of time on the encounter. [FreeTextEntry3] : I personally saw and examined DL BRAVO in detail. I spoke to ESCOBAR Pillai regarding the assessment and plan of care. I reviewed the above assessment and plan of care, and agree. I have made changes in changes in the body of the note where appropriate.I personally reviewed the HPI, PMH, FH, SH, ROS and medications/allergies. I have spoken to ESCOBAR Pillai regarding the history and have personally determined the assessment and plan of care, and documented this myself. I was present and participated in all key portions of the encounter and all procedures noted above. I have made changes in the body of the note where appropriate.\par \par Attesting Faculty: See Attending Signature Below \par \par \par

## 2023-06-15 NOTE — HISTORY OF PRESENT ILLNESS
[Hearing Loss] : hearing loss [Ear Fullness] : ear fullness [de-identified] : 46 yo\par PAtient presents s/p ED at [facility]  for Right Malignant OE. She was hospitalized April 16 - 25 for right Malignant OE was treated with IV Cipro. She was discharged with a PICC and Zoysn for 6 weeks. The PICC line was removed on Tuesday May 30. Today she is feeling better. Denies ear pain, drainage or hearing loss. \par H/o recurrent righht ear infections since 2014 whe she was at the beach and a "stone "was found lodged in her right ear. This was treated by debridement at the time bit infection have still been recurrent\par No h/o Diabetes [FreeTextEntry1] : 6/15/2023\par PAtient presents for follow up s/p right ear debridement \par noted to have TM perf w/ cholesteatoma\par Hearing Loss persists\par Not dizzy\par no other modifying factors\par no nasal or throat complaints\par  [Nasal Congestion] : no nasal congestion [Neck Mass] : no neck mass [Cough] : no cough

## 2023-06-26 ENCOUNTER — APPOINTMENT (OUTPATIENT)
Dept: OTOLARYNGOLOGY | Facility: CLINIC | Age: 48
End: 2023-06-26
Payer: COMMERCIAL

## 2023-06-26 VITALS — SYSTOLIC BLOOD PRESSURE: 144 MMHG | DIASTOLIC BLOOD PRESSURE: 98 MMHG | TEMPERATURE: 97.8 F | HEART RATE: 83 BPM

## 2023-06-26 DIAGNOSIS — H71.21 CHOLESTEATOMA OF MASTOID, RIGHT EAR: ICD-10-CM

## 2023-06-26 DIAGNOSIS — H72.01 CENTRAL PERFORATION OF TYMPANIC MEMBRANE, RIGHT EAR: ICD-10-CM

## 2023-06-26 DIAGNOSIS — H61.23 IMPACTED CERUMEN, BILATERAL: ICD-10-CM

## 2023-06-26 PROCEDURE — 99213 OFFICE O/P EST LOW 20 MIN: CPT | Mod: 25

## 2023-06-26 PROCEDURE — 92504 EAR MICROSCOPY EXAMINATION: CPT | Mod: RT

## 2023-06-27 NOTE — PHYSICAL EXAM
[Midline] : trachea located in midline position [Binocular Microscopic Exam] : Binocular microscopic exam was performed [Normal] : the right external canal was normal [Hearing Loss Right Only] : normal [Hearing Loss Left Only] : normal [FreeTextEntry9] : wax [de-identified] : clean perf-no cholesteatoma noted

## 2023-06-27 NOTE — ASSESSMENT
[FreeTextEntry1] : Ms. BRAVO 47 year F presents s/p right ear debridement w/ TM Perf w/ cholesteatoma. Dizziness improved since last visit. \par \par Post/Sup TM Perf with Cholesteatoma-now looks clean\par -debrided right ear \par -wax cleaned from left ear \par -Has an apt with Dr. Craig Aug 17, will try to get her a sooner apt \par -H2O precautions reviewed\par -consider repeat CT scan of Temporal Bones after further office debridement\par \par f/u 1 month or  prn

## 2023-06-27 NOTE — PROCEDURE
[FreeTextEntry3] : After informed verbal consent is obtained, cerumen is removed from the right and left ear canal with suction \par sl Dizzy s/p debridement [Risk and Benefits Discussed] : The purpose, risks, discomforts, benefits and alternatives of the procedure have been explained to the patient including no treatment. [Same] : same as the Pre Op Dx. [] : Removal of Cerumen [FreeTextEntry1] : Right ear cholesteatoma [FreeTextEntry6] : the microscope was necessary for illumination and magnification\par improved exam\par clean perf and EAC\par no cho;esteatomanotd

## 2023-06-27 NOTE — HISTORY OF PRESENT ILLNESS
[Hearing Loss] : hearing loss [Ear Fullness] : ear fullness [de-identified] : 48 yo\par PAtient presents s/p ED at [facility]  for Right Malignant OE. She was hospitalized April 16 - 25 for right Malignant OE was treated with IV Cipro. She was discharged with a PICC and Zoysn for 6 weeks. The PICC line was removed on Tuesday May 30. Today she is feeling better. Denies ear pain, drainage or hearing loss. \par H/o recurrent righht ear infections since 2014 whe she was at the beach and a "stone "was found lodged in her right ear. This was treated by debridement at the time bit infection have still been recurrent\par No h/o Diabetes [FreeTextEntry1] : 6/26/2023\par PAtient presents for follow up s/p right ear debridement \par noted to have TM perf w/ cholesteatoma\par Hearing Loss persists\par Not dizzy\par no other modifying factors\par no nasal or throat complaints\par  [Nasal Congestion] : no nasal congestion [Neck Mass] : no neck mass [Cough] : no cough

## 2023-06-27 NOTE — DATA REVIEWED
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

## 2023-07-24 ENCOUNTER — APPOINTMENT (OUTPATIENT)
Dept: OTOLARYNGOLOGY | Facility: CLINIC | Age: 48
End: 2023-07-24

## 2023-08-17 ENCOUNTER — APPOINTMENT (OUTPATIENT)
Dept: OTOLARYNGOLOGY | Facility: CLINIC | Age: 48
End: 2023-08-17

## 2023-08-29 NOTE — PROGRESS NOTE ADULT - ATTENDING SUPERVISION STATEMENT
[FreeTextEntry8] : Patient is a 68 year old F with hx of cataracts, glaucoma, HTN, HLD, OA here for an acute visit regarding L knee pain as well as requesting sleep referral  #L knee pain - 2 weeks of L knee pain and swelling with limited ROM, woke up with it one day - has tried tylenol, aspercreme, and cold packs with some relief - reports some improvement in the past 2 weeks - worst in the morning when she wakes up, improves with movement, worse without activity - has had this before and had some fluid aspirated 
Resident

## 2024-01-18 NOTE — ED ADULT NURSE NOTE - NS ED NOTE ABUSE SUSPICION NEGLECT YN
Spoke with pt to discuss stool results. Pt understood results. Also asked if the pt would like to schedule egd, pt states that she will call back to schedule at a later date. Verbal understanding   No

## 2024-02-16 ENCOUNTER — APPOINTMENT (OUTPATIENT)
Dept: OTOLARYNGOLOGY | Facility: CLINIC | Age: 49
End: 2024-02-16

## 2024-10-09 NOTE — ED PROVIDER NOTE - CPE EDP NEURO NORM
I attest my time as PA/NP is greater than 50% of the total combined time spent on qualifying patient care activities by the PA/NP and attending.
normal...

## 2025-08-14 ENCOUNTER — EMERGENCY (EMERGENCY)
Facility: HOSPITAL | Age: 50
LOS: 0 days | Discharge: HOME | End: 2025-08-14
Attending: STUDENT IN AN ORGANIZED HEALTH CARE EDUCATION/TRAINING PROGRAM
Payer: OTHER MISCELLANEOUS

## 2025-08-14 VITALS
RESPIRATION RATE: 16 BRPM | WEIGHT: 175.05 LBS | OXYGEN SATURATION: 98 % | TEMPERATURE: 99 F | HEART RATE: 93 BPM | HEIGHT: 62 IN | DIASTOLIC BLOOD PRESSURE: 104 MMHG | SYSTOLIC BLOOD PRESSURE: 155 MMHG

## 2025-08-14 DIAGNOSIS — M54.50 LOW BACK PAIN, UNSPECIFIED: ICD-10-CM

## 2025-08-14 DIAGNOSIS — I10 ESSENTIAL (PRIMARY) HYPERTENSION: ICD-10-CM
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
## 2025-08-14 PROCEDURE — 99284 EMERGENCY DEPT VISIT MOD MDM: CPT

## 2025-08-14 RX ORDER — DIAZEPAM 5 MG/1
5 TABLET ORAL ONCE
Refills: 0 | Status: DISCONTINUED | OUTPATIENT
Start: 2025-08-14 | End: 2025-08-14

## 2025-08-14 RX ORDER — LIDOCAINE HYDROCHLORIDE 20 MG/ML
1 JELLY TOPICAL ONCE
Refills: 0 | Status: COMPLETED | OUTPATIENT
Start: 2025-08-14 | End: 2025-08-14

## 2025-08-14 RX ORDER — KETOROLAC TROMETHAMINE 30 MG/ML
15 INJECTION, SOLUTION INTRAMUSCULAR; INTRAVENOUS ONCE
Refills: 0 | Status: DISCONTINUED | OUTPATIENT
Start: 2025-08-14 | End: 2025-08-14

## 2025-08-14 RX ADMIN — KETOROLAC TROMETHAMINE 15 MILLIGRAM(S): 30 INJECTION, SOLUTION INTRAMUSCULAR; INTRAVENOUS at 23:33

## 2025-08-14 RX ADMIN — LIDOCAINE HYDROCHLORIDE 1 PATCH: 20 JELLY TOPICAL at 23:33

## 2025-08-14 RX ADMIN — DIAZEPAM 5 MILLIGRAM(S): 5 TABLET ORAL at 23:33
